# Patient Record
Sex: FEMALE | Race: WHITE | Employment: OTHER | URBAN - NONMETROPOLITAN AREA
[De-identification: names, ages, dates, MRNs, and addresses within clinical notes are randomized per-mention and may not be internally consistent; named-entity substitution may affect disease eponyms.]

---

## 2017-10-13 ENCOUNTER — HOSPITAL ENCOUNTER (OUTPATIENT)
Age: 62
Discharge: HOME | End: 2017-10-13
Payer: MEDICARE

## 2017-10-13 LAB
ALBUMIN SERPL-MCNC: 3.8 G/DL (ref 3.5–5.1)
ALP BLD-CCNC: 100 U/L (ref 38–126)
ALT SERPL-CCNC: 16 U/L (ref 11–66)
ANION GAP SERPL CALCULATED.3IONS-SCNC: 14 MEQ/L (ref 8–16)
ANISOCYTOSIS: ABNORMAL
AST SERPL-CCNC: 15 U/L (ref 5–40)
AVERAGE GLUCOSE: 117 MG/DL (ref 70–126)
BASOPHILS # BLD: 0.7 %
BASOPHILS ABSOLUTE: 0.1 THOU/MM3 (ref 0–0.1)
BILIRUB SERPL-MCNC: 0.4 MG/DL (ref 0.3–1.2)
BUN BLDV-MCNC: 14 MG/DL (ref 7–22)
CALCIUM SERPL-MCNC: 9.4 MG/DL (ref 8.5–10.5)
CHLORIDE BLD-SCNC: 97 MEQ/L (ref 98–111)
CHOLESTEROL, TOTAL: 179 MG/DL (ref 100–199)
CO2: 27 MEQ/L (ref 23–33)
CREAT SERPL-MCNC: 0.6 MG/DL (ref 0.4–1.2)
CREATININE, URINE: 76.2 MG/DL
CRENATED RBC'S: ABNORMAL
EOSINOPHIL # BLD: 0.8 %
EOSINOPHILS ABSOLUTE: 0.1 THOU/MM3 (ref 0–0.4)
GFR SERPL CREATININE-BSD FRML MDRD: > 90 ML/MIN/1.73M2
GLUCOSE BLD-MCNC: 138 MG/DL (ref 70–108)
HBA1C MFR BLD: 5.9 % (ref 4.4–6.4)
HCT VFR BLD CALC: 44.8 % (ref 37–47)
HDLC SERPL-MCNC: 69 MG/DL
HEMOGLOBIN: 14.5 GM/DL (ref 12–16)
HYPOCHROMIA: ABNORMAL
LDL CHOLESTEROL CALCULATED: 87 MG/DL
LYMPHOCYTES # BLD: 11.2 %
LYMPHOCYTES ABSOLUTE: 1.3 THOU/MM3 (ref 1–4.8)
MCH RBC QN AUTO: 25.2 PG (ref 27–31)
MCHC RBC AUTO-ENTMCNC: 32.5 GM/DL (ref 33–37)
MCV RBC AUTO: 77.6 FL (ref 81–99)
MICROALBUMIN UR-MCNC: < 1.2 MG/DL
MICROALBUMIN/CREAT UR-RTO: 16 MG/G (ref 0–30)
MICROCYTES: ABNORMAL
MONOCYTES # BLD: 6.1 %
MONOCYTES ABSOLUTE: 0.7 THOU/MM3 (ref 0.4–1.3)
NUCLEATED RED BLOOD CELLS: 0 /100 WBC
OVALOCYTES: ABNORMAL
PDW BLD-RTO: 15.5 % (ref 11.5–14.5)
PLATELET # BLD: 227 THOU/MM3 (ref 130–400)
PLATELET ESTIMATE: ADEQUATE
PMV BLD AUTO: 10.5 MCM (ref 7.4–10.4)
POTASSIUM SERPL-SCNC: 4.3 MEQ/L (ref 3.5–5.2)
RBC # BLD: 5.77 MILL/MM3 (ref 4.2–5.4)
RBC # BLD: NORMAL 10*6/UL
SCAN OF BLOOD SMEAR: NORMAL
SCHISTOCYTES: ABNORMAL
SEDIMENTATION RATE, ERYTHROCYTE: 26 MM/HR (ref 0–20)
SEG NEUTROPHILS: 81.2 %
SEGMENTED NEUTROPHILS ABSOLUTE COUNT: 9.7 THOU/MM3 (ref 1.8–7.7)
SODIUM BLD-SCNC: 138 MEQ/L (ref 135–145)
TOTAL PROTEIN: 7.2 G/DL (ref 6.1–8)
TRIGL SERPL-MCNC: 117 MG/DL (ref 0–199)
TSH SERPL DL<=0.05 MIU/L-ACNC: 0.69 UIU/ML (ref 0.4–4.2)
WBC # BLD: 12 THOU/MM3 (ref 4.8–10.8)

## 2017-10-13 PROCEDURE — 80061 LIPID PANEL: CPT

## 2017-10-13 PROCEDURE — 85651 RBC SED RATE NONAUTOMATED: CPT

## 2017-10-13 PROCEDURE — 82043 UR ALBUMIN QUANTITATIVE: CPT

## 2017-10-13 PROCEDURE — 36415 COLL VENOUS BLD VENIPUNCTURE: CPT

## 2017-10-13 PROCEDURE — 83036 HEMOGLOBIN GLYCOSYLATED A1C: CPT

## 2017-10-13 PROCEDURE — 80050 GENERAL HEALTH PANEL: CPT

## 2017-11-16 ENCOUNTER — HOSPITAL ENCOUNTER (OUTPATIENT)
Age: 62
Discharge: HOME | End: 2017-11-16
Payer: MEDICARE

## 2017-11-16 LAB
ANISOCYTOSIS: ABNORMAL
BASOPHILS # BLD: 0.5 %
BASOPHILS ABSOLUTE: 0 THOU/MM3 (ref 0–0.1)
EOSINOPHIL # BLD: 7.2 %
EOSINOPHILS ABSOLUTE: 0.7 THOU/MM3 (ref 0–0.4)
HCT VFR BLD CALC: 44.2 % (ref 37–47)
HEMOGLOBIN: 14.2 GM/DL (ref 12–16)
HYPOCHROMIA: ABNORMAL
IGE: 29 IU/ML
LYMPHOCYTES # BLD: 22.7 %
LYMPHOCYTES ABSOLUTE: 2.2 THOU/MM3 (ref 1–4.8)
MCH RBC QN AUTO: 24.7 PG (ref 27–31)
MCHC RBC AUTO-ENTMCNC: 32.1 GM/DL (ref 33–37)
MCV RBC AUTO: 77 FL (ref 81–99)
MICROCYTES: ABNORMAL
MONOCYTES # BLD: 6.5 %
MONOCYTES ABSOLUTE: 0.6 THOU/MM3 (ref 0.4–1.3)
NUCLEATED RED BLOOD CELLS: 0 /100 WBC
PDW BLD-RTO: 15.5 % (ref 11.5–14.5)
PLATELET # BLD: 243 THOU/MM3 (ref 130–400)
PMV BLD AUTO: 10.1 MCM (ref 7.4–10.4)
RBC # BLD: 5.74 MILL/MM3 (ref 4.2–5.4)
SEG NEUTROPHILS: 63.1 %
SEGMENTED NEUTROPHILS ABSOLUTE COUNT: 6.2 THOU/MM3 (ref 1.8–7.7)
WBC # BLD: 9.8 THOU/MM3 (ref 4.8–10.8)

## 2017-11-16 PROCEDURE — 82785 ASSAY OF IGE: CPT

## 2017-11-16 PROCEDURE — 85025 COMPLETE CBC W/AUTO DIFF WBC: CPT

## 2017-11-16 PROCEDURE — 36415 COLL VENOUS BLD VENIPUNCTURE: CPT

## 2018-01-08 ENCOUNTER — HOSPITAL ENCOUNTER (OUTPATIENT)
Age: 63
Discharge: HOME | End: 2018-01-08
Payer: MEDICARE

## 2018-01-08 LAB
ALT SERPL-CCNC: 25 U/L (ref 11–66)
ANISOCYTOSIS: ABNORMAL
BASOPHILS # BLD: 1.2 %
BASOPHILS ABSOLUTE: 0.1 THOU/MM3 (ref 0–0.1)
CREAT SERPL-MCNC: 0.7 MG/DL (ref 0.4–1.2)
EOSINOPHIL # BLD: 4.8 %
EOSINOPHILS ABSOLUTE: 0.5 THOU/MM3 (ref 0–0.4)
GFR SERPL CREATININE-BSD FRML MDRD: 85 ML/MIN/1.73M2
HCT VFR BLD CALC: 46 % (ref 37–47)
HEMOGLOBIN: 15.1 GM/DL (ref 12–16)
HYPOCHROMIA: ABNORMAL
LYMPHOCYTES # BLD: 26.5 %
LYMPHOCYTES ABSOLUTE: 2.8 THOU/MM3 (ref 1–4.8)
MCH RBC QN AUTO: 25.6 PG (ref 27–31)
MCHC RBC AUTO-ENTMCNC: 32.8 GM/DL (ref 33–37)
MCV RBC AUTO: 78.2 FL (ref 81–99)
MICROCYTES: ABNORMAL
MONOCYTES # BLD: 8.5 %
MONOCYTES ABSOLUTE: 0.9 THOU/MM3 (ref 0.4–1.3)
NUCLEATED RED BLOOD CELLS: 0 /100 WBC
PDW BLD-RTO: 15.8 % (ref 11.5–14.5)
PLATELET # BLD: 225 THOU/MM3 (ref 130–400)
PMV BLD AUTO: 9.6 MCM (ref 7.4–10.4)
RBC # BLD: 5.88 MILL/MM3 (ref 4.2–5.4)
SEDIMENTATION RATE, ERYTHROCYTE: 15 MM/HR (ref 0–20)
SEG NEUTROPHILS: 59 %
SEGMENTED NEUTROPHILS ABSOLUTE COUNT: 6.3 THOU/MM3 (ref 1.8–7.7)
WBC # BLD: 10.6 THOU/MM3 (ref 4.8–10.8)

## 2018-01-08 PROCEDURE — 36415 COLL VENOUS BLD VENIPUNCTURE: CPT

## 2018-01-08 PROCEDURE — 85025 COMPLETE CBC W/AUTO DIFF WBC: CPT

## 2018-01-08 PROCEDURE — 85651 RBC SED RATE NONAUTOMATED: CPT

## 2018-01-08 PROCEDURE — 82565 ASSAY OF CREATININE: CPT

## 2018-01-08 PROCEDURE — 84460 ALANINE AMINO (ALT) (SGPT): CPT

## 2018-04-20 ENCOUNTER — HOSPITAL ENCOUNTER (OUTPATIENT)
Age: 63
Discharge: HOME | End: 2018-04-20
Payer: MEDICARE

## 2018-04-20 LAB
ANION GAP SERPL CALCULATED.3IONS-SCNC: 12 MEQ/L (ref 8–16)
AVERAGE GLUCOSE: 126 MG/DL (ref 70–126)
BUN BLDV-MCNC: 12 MG/DL (ref 7–22)
CALCIUM SERPL-MCNC: 9.6 MG/DL (ref 8.5–10.5)
CHLORIDE BLD-SCNC: 103 MEQ/L (ref 98–111)
CO2: 28 MEQ/L (ref 23–33)
CREAT SERPL-MCNC: 0.6 MG/DL (ref 0.4–1.2)
GFR SERPL CREATININE-BSD FRML MDRD: > 90 ML/MIN/1.73M2
GLUCOSE BLD-MCNC: 136 MG/DL (ref 70–108)
HBA1C MFR BLD: 6.2 % (ref 4.4–6.4)
POTASSIUM SERPL-SCNC: 3.9 MEQ/L (ref 3.5–5.2)
SODIUM BLD-SCNC: 143 MEQ/L (ref 135–145)

## 2018-04-20 PROCEDURE — 83036 HEMOGLOBIN GLYCOSYLATED A1C: CPT

## 2018-04-20 PROCEDURE — 36415 COLL VENOUS BLD VENIPUNCTURE: CPT

## 2018-04-20 PROCEDURE — 80048 BASIC METABOLIC PNL TOTAL CA: CPT

## 2018-06-19 ENCOUNTER — HOSPITAL ENCOUNTER (OUTPATIENT)
Age: 63
Discharge: HOME | End: 2018-06-19
Payer: MEDICARE

## 2018-06-19 LAB
ALT SERPL-CCNC: 21 U/L (ref 11–66)
ANISOCYTOSIS: ABNORMAL
BASOPHILS # BLD: 0.8 %
BASOPHILS ABSOLUTE: 0.1 THOU/MM3 (ref 0–0.1)
CREAT SERPL-MCNC: 0.7 MG/DL (ref 0.4–1.2)
EOSINOPHIL # BLD: 6.9 %
EOSINOPHILS ABSOLUTE: 0.7 THOU/MM3 (ref 0–0.4)
GFR SERPL CREATININE-BSD FRML MDRD: 85 ML/MIN/1.73M2
HCT VFR BLD CALC: 42.3 % (ref 37–47)
HEMOGLOBIN: 13.9 GM/DL (ref 12–16)
HYPOCHROMIA: ABNORMAL
LYMPHOCYTES # BLD: 23.2 %
LYMPHOCYTES ABSOLUTE: 2.3 THOU/MM3 (ref 1–4.8)
MCH RBC QN AUTO: 25.8 PG (ref 27–31)
MCHC RBC AUTO-ENTMCNC: 32.8 GM/DL (ref 33–37)
MCV RBC AUTO: 78.5 FL (ref 81–99)
MICROCYTES: ABNORMAL
MONOCYTES # BLD: 9.1 %
MONOCYTES ABSOLUTE: 0.9 THOU/MM3 (ref 0.4–1.3)
NUCLEATED RED BLOOD CELLS: 0 /100 WBC
PDW BLD-RTO: 15.5 % (ref 11.5–14.5)
PLATELET # BLD: 233 THOU/MM3 (ref 130–400)
PMV BLD AUTO: 10.1 FL (ref 7.4–10.4)
RBC # BLD: 5.39 MILL/MM3 (ref 4.2–5.4)
SEDIMENTATION RATE, ERYTHROCYTE: 36 MM/HR (ref 0–20)
SEG NEUTROPHILS: 60 %
SEGMENTED NEUTROPHILS ABSOLUTE COUNT: 5.9 THOU/MM3 (ref 1.8–7.7)
WBC # BLD: 9.9 THOU/MM3 (ref 4.8–10.8)

## 2018-06-19 PROCEDURE — 85651 RBC SED RATE NONAUTOMATED: CPT

## 2018-06-19 PROCEDURE — 82565 ASSAY OF CREATININE: CPT

## 2018-06-19 PROCEDURE — 85025 COMPLETE CBC W/AUTO DIFF WBC: CPT

## 2018-06-19 PROCEDURE — 84460 ALANINE AMINO (ALT) (SGPT): CPT

## 2018-06-19 PROCEDURE — 36415 COLL VENOUS BLD VENIPUNCTURE: CPT

## 2019-01-10 PROBLEM — J45.50 SEVERE PERSISTENT ASTHMA: Status: ACTIVE | Noted: 2019-01-10

## 2019-01-15 ENCOUNTER — HOSPITAL ENCOUNTER (OUTPATIENT)
Dept: NURSING | Age: 64
Discharge: HOME | End: 2019-01-15
Payer: MEDICARE

## 2019-01-15 DIAGNOSIS — J45.50 SEVERE PERSISTENT ASTHMA, UNSPECIFIED WHETHER COMPLICATED: ICD-10-CM

## 2019-01-29 ENCOUNTER — HOSPITAL ENCOUNTER (OUTPATIENT)
Dept: NURSING | Age: 64
Discharge: HOME | End: 2019-01-29
Payer: MEDICARE

## 2019-01-29 VITALS
TEMPERATURE: 98 F | WEIGHT: 293 LBS | SYSTOLIC BLOOD PRESSURE: 156 MMHG | OXYGEN SATURATION: 98 % | DIASTOLIC BLOOD PRESSURE: 95 MMHG | RESPIRATION RATE: 18 BRPM | BODY MASS INDEX: 69.19 KG/M2 | HEART RATE: 71 BPM

## 2019-01-29 DIAGNOSIS — J45.50 SEVERE PERSISTENT ASTHMA, UNSPECIFIED WHETHER COMPLICATED: ICD-10-CM

## 2019-01-29 PROCEDURE — 96372 THER/PROPH/DIAG INJ SC/IM: CPT

## 2019-01-29 PROCEDURE — 6360000002 HC RX W HCPCS: Performed by: INTERNAL MEDICINE

## 2019-01-29 RX ADMIN — BENRALIZUMAB 30 MG: 30 INJECTION, SOLUTION SUBCUTANEOUS at 11:32

## 2019-01-29 ASSESSMENT — PAIN - FUNCTIONAL ASSESSMENT: PAIN_FUNCTIONAL_ASSESSMENT: 0-10

## 2019-01-29 NOTE — PROGRESS NOTES
1034 Patient arrived to \[facility]\"" ambulatory for injection  PT RIGHTS AND RESPONSIBILITIES OFFERED TO PT.  1102 Lungs clear on left side with inspiratory wheeze noted on right side  1132 Medication given to patient pulse ox and B/P cuff applied instructed patient on importance of calling out if any problems breathing or swelling verbalized understanding.   1145 Patient denies any complaints  1200 Patient denies any needs or complaints  1229 Discharge instructions reviewed with patient verbalized understanding  1235 Patient discharged to home in stable condition    _m___ Safety:       (Environmental)  Margy Honey to environment   Ensure ID band is correct and in place/ allergy band as needed   Assess for fall risk   Initiate fall precautions as applicable (fall band, side rails, etc.)   Call light within reach   Bed in low position/ wheels locked    m____ Pain:        Assess pain level and characteristics   Administer analgesics as ordered   Assess effectiveness of pain management and report to MD as needed    _m___ Knowledge Deficit:   Assess baseline knowledge   Provide teaching at level of understanding   Provide teaching via preferred learning method   Evaluate teaching effectiveness    _m___ Hemodynamic/Respiratory Status:       (Pre and Post Procedure Monitoring)   Assess/Monitor vital signs and LOC   Assess Baseline SpO2 prior to any sedation   Obtain weight/height   Assess vital signs/ LOC until patient meets discharge criteria   Monitor procedure site and notify MD of any issues

## 2019-02-26 ENCOUNTER — HOSPITAL ENCOUNTER (OUTPATIENT)
Dept: NURSING | Age: 64
Discharge: HOME | End: 2019-02-26
Payer: MEDICARE

## 2019-02-26 VITALS
DIASTOLIC BLOOD PRESSURE: 67 MMHG | SYSTOLIC BLOOD PRESSURE: 173 MMHG | HEART RATE: 92 BPM | RESPIRATION RATE: 20 BRPM | OXYGEN SATURATION: 95 % | TEMPERATURE: 96.9 F

## 2019-02-26 DIAGNOSIS — J45.50 SEVERE PERSISTENT ASTHMA, UNSPECIFIED WHETHER COMPLICATED: ICD-10-CM

## 2019-02-26 PROCEDURE — 6360000002 HC RX W HCPCS: Performed by: INTERNAL MEDICINE

## 2019-02-26 PROCEDURE — 96372 THER/PROPH/DIAG INJ SC/IM: CPT

## 2019-02-26 RX ADMIN — BENRALIZUMAB 30 MG: 30 INJECTION, SOLUTION SUBCUTANEOUS at 10:21

## 2019-02-26 NOTE — PROGRESS NOTES
8823 pt arrives ambulatory for fasenra injection. Injection explained and questions answered. PT RIGHTS AND RESPONSIBILITIES OFFERED TO PT. Pt educated that medication has to be sent up from pharmacy and that is the reason why I called her this AM to confirm she was coming to appointment and to order medication. Pt provided with OPN phone number and educated to call 2 hours prior to appointment. Pt verbalized understanding. 1021 injection given. Pt tolerated it well with no complaints. 1025 pt discharged ambulatory with instructions with no complaints.                      __m__ Safety:       (Environmental)   [place] to environment   Ensure ID band is correct and in place/ allergy band as needed   Assess for fall risk   Initiate fall precautions as applicable (fall band, side rails, etc.)   Call light within reach   Bed in low position/ wheels locked    __m__ Pain:        Assess pain level and characteristics   Administer analgesics as ordered   Assess effectiveness of pain management and report to MD as needed    _m___ Knowledge Deficit:   Assess baseline knowledge   Provide teaching at level of understanding   Provide teaching via preferred learning method   Evaluate teaching effectiveness    __m__ Hemodynamic/Respiratory Status:       (Pre and Post Procedure Monitoring)   Assess/Monitor vital signs and LOC   Assess Baseline SpO2 prior to any sedation   Obtain weight/height   Assess vital signs/ LOC until patient meets discharge criteria   Monitor procedure site and notify MD of any issues

## 2019-02-27 ENCOUNTER — HOSPITAL ENCOUNTER (OUTPATIENT)
Age: 64
Discharge: HOME | End: 2019-02-27
Payer: MEDICARE

## 2019-02-27 LAB
ALT SERPL-CCNC: 24 U/L (ref 11–66)
BASOPHILS # BLD: 0.5 %
BASOPHILS ABSOLUTE: 0 THOU/MM3 (ref 0–0.1)
CREAT SERPL-MCNC: 0.5 MG/DL (ref 0.4–1.2)
EOSINOPHIL # BLD: 0 %
EOSINOPHILS ABSOLUTE: 0 THOU/MM3 (ref 0–0.4)
ERYTHROCYTE [DISTWIDTH] IN BLOOD BY AUTOMATED COUNT: 15.2 % (ref 11.5–14.5)
ERYTHROCYTE [DISTWIDTH] IN BLOOD BY AUTOMATED COUNT: 43.5 FL (ref 35–45)
GFR SERPL CREATININE-BSD FRML MDRD: > 90 ML/MIN/1.73M2
HCT VFR BLD CALC: 46.2 % (ref 37–47)
HEMOGLOBIN: 14.2 GM/DL (ref 12–16)
IMMATURE GRANS (ABS): 0.02 THOU/MM3 (ref 0–0.07)
IMMATURE GRANULOCYTES: 0.3 %
LYMPHOCYTES # BLD: 29.3 %
LYMPHOCYTES ABSOLUTE: 1.9 THOU/MM3 (ref 1–4.8)
MCH RBC QN AUTO: 24.8 PG (ref 26–33)
MCHC RBC AUTO-ENTMCNC: 30.7 GM/DL (ref 32.2–35.5)
MCV RBC AUTO: 80.8 FL (ref 81–99)
MONOCYTES # BLD: 8.3 %
MONOCYTES ABSOLUTE: 0.5 THOU/MM3 (ref 0.4–1.3)
NUCLEATED RED BLOOD CELLS: 0 /100 WBC
PLATELET # BLD: 255 THOU/MM3 (ref 130–400)
PMV BLD AUTO: 12.3 FL (ref 9.4–12.4)
RBC # BLD: 5.72 MILL/MM3 (ref 4.2–5.4)
SEDIMENTATION RATE, ERYTHROCYTE: 28 MM/HR (ref 0–20)
SEG NEUTROPHILS: 61.6 %
SEGMENTED NEUTROPHILS ABSOLUTE COUNT: 3.9 THOU/MM3 (ref 1.8–7.7)
WBC # BLD: 6.4 THOU/MM3 (ref 4.8–10.8)

## 2019-02-27 PROCEDURE — 36415 COLL VENOUS BLD VENIPUNCTURE: CPT

## 2019-02-27 PROCEDURE — 84460 ALANINE AMINO (ALT) (SGPT): CPT

## 2019-02-27 PROCEDURE — 85651 RBC SED RATE NONAUTOMATED: CPT

## 2019-02-27 PROCEDURE — 82565 ASSAY OF CREATININE: CPT

## 2019-02-27 PROCEDURE — 85025 COMPLETE CBC W/AUTO DIFF WBC: CPT

## 2019-03-26 ENCOUNTER — HOSPITAL ENCOUNTER (OUTPATIENT)
Dept: NURSING | Age: 64
Discharge: HOME | End: 2019-03-26
Payer: MEDICARE

## 2019-03-26 VITALS
TEMPERATURE: 97.1 F | HEART RATE: 91 BPM | OXYGEN SATURATION: 94 % | RESPIRATION RATE: 22 BRPM | SYSTOLIC BLOOD PRESSURE: 156 MMHG | DIASTOLIC BLOOD PRESSURE: 71 MMHG

## 2019-03-26 DIAGNOSIS — J45.50 SEVERE PERSISTENT ASTHMA, UNSPECIFIED WHETHER COMPLICATED: Primary | ICD-10-CM

## 2019-03-26 PROCEDURE — 96372 THER/PROPH/DIAG INJ SC/IM: CPT

## 2019-03-26 PROCEDURE — 6360000002 HC RX W HCPCS: Performed by: INTERNAL MEDICINE

## 2019-03-26 RX ADMIN — BENRALIZUMAB 30 MG: 30 INJECTION, SOLUTION SUBCUTANEOUS at 09:04

## 2019-03-26 ASSESSMENT — PAIN - FUNCTIONAL ASSESSMENT: PAIN_FUNCTIONAL_ASSESSMENT: 0-10

## 2019-03-26 NOTE — PROGRESS NOTES
5329 Patient arrived to [facility] ambulatory for fasenra injection  PT RIGHTS AND RESPONSIBILITIES OFFERED TO PT.  0911 Discharge instructions given to patient verbalized understanding.  Patient discharged to home in stable condition      _m___ Safety:       (Environmental)  [place] to environment   Ensure ID band is correct and in place/ allergy band as needed   Assess for fall risk   Initiate fall precautions as applicable (fall band, side rails, etc.)   Call light within reach   Bed in low position/ wheels locked    m____ Pain:        Assess pain level and characteristics   Administer analgesics as ordered   Assess effectiveness of pain management and report to MD as needed    _m___ Knowledge Deficit:   Assess baseline knowledge   Provide teaching at level of understanding   Provide teaching via preferred learning method   Evaluate teaching effectiveness    _m___ Hemodynamic/Respiratory Status:       (Pre and Post Procedure Monitoring)   Assess/Monitor vital signs and LOC   Assess Baseline SpO2 prior to any sedation   Obtain weight/height   Assess vital signs/ LOC until patient meets discharge criteria   Monitor procedure site and notify MD of any issues

## 2019-05-20 ENCOUNTER — HOSPITAL ENCOUNTER (OUTPATIENT)
Dept: NURSING | Age: 64
Discharge: HOME | End: 2019-05-20
Payer: MEDICARE

## 2019-05-20 VITALS
RESPIRATION RATE: 20 BRPM | SYSTOLIC BLOOD PRESSURE: 160 MMHG | HEART RATE: 95 BPM | DIASTOLIC BLOOD PRESSURE: 97 MMHG | TEMPERATURE: 97.3 F | OXYGEN SATURATION: 96 %

## 2019-05-20 DIAGNOSIS — J45.50 SEVERE PERSISTENT ASTHMA, UNSPECIFIED WHETHER COMPLICATED: Primary | ICD-10-CM

## 2019-05-20 PROCEDURE — 96372 THER/PROPH/DIAG INJ SC/IM: CPT

## 2019-05-20 PROCEDURE — 6360000002 HC RX W HCPCS: Performed by: INTERNAL MEDICINE

## 2019-05-20 RX ADMIN — BENRALIZUMAB 30 MG: 30 INJECTION, SOLUTION SUBCUTANEOUS at 09:05

## 2019-05-20 NOTE — PROGRESS NOTES
0900 pt arrives ambulatory for fasenra injection. Injection explained and questions answered. PT RIGHTS AND RESPONSIBILITIES OFFERED TO PT.  0905 injection given. Pt tolerated it well with no complaints. 3886 pt discharged ambulatory with instructions with no complaints.                __m__ Safety:       (Environmental)   [place] to environment   Ensure ID band is correct and in place/ allergy band as needed   Assess for fall risk   Initiate fall precautions as applicable (fall band, side rails, etc.)   Call light within reach   Bed in low position/ wheels locked    __m__ Pain:        Assess pain level and characteristics   Administer analgesics as ordered   Assess effectiveness of pain management and report to MD as needed    __m__ Knowledge Deficit:   Assess baseline knowledge   Provide teaching at level of understanding   Provide teaching via preferred learning method   Evaluate teaching effectiveness    _m___ Hemodynamic/Respiratory Status:       (Pre and Post Procedure Monitoring)   Assess/Monitor vital signs and LOC   Assess Baseline SpO2 prior to any sedation   Obtain weight/height   Assess vital signs/ LOC until patient meets discharge criteria   Monitor procedure site and notify MD of any issues

## 2019-07-16 ENCOUNTER — HOSPITAL ENCOUNTER (OUTPATIENT)
Dept: NURSING | Age: 64
End: 2019-07-16
Payer: MEDICARE

## 2019-09-16 ENCOUNTER — TELEPHONE (OUTPATIENT)
Dept: ADMINISTRATIVE | Age: 64
End: 2019-09-16

## 2020-02-04 ENCOUNTER — TELEPHONE (OUTPATIENT)
Dept: SPIRITUAL SERVICES | Facility: CLINIC | Age: 65
End: 2020-02-04

## 2020-02-04 NOTE — TELEPHONE ENCOUNTER
I responded to a voicemail message received from Hayder requesting support for the completion of Advance Directives documents. I contacted Hayder via telephone call to offer support for the completion of documents. An appointment is scheduled for 2/28 at [facility].

## 2020-02-28 ENCOUNTER — TELEPHONE (OUTPATIENT)
Dept: SPIRITUAL SERVICES | Facility: CLINIC | Age: 65
End: 2020-02-28
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
## 2020-07-21 ENCOUNTER — NURSE ONLY (OUTPATIENT)
Dept: LAB | Age: 65
End: 2020-07-21

## 2020-07-21 LAB
ALT SERPL-CCNC: 14 U/L (ref 11–66)
BASOPHILS # BLD: 0.4 %
BASOPHILS ABSOLUTE: 0 THOU/MM3 (ref 0–0.1)
CREAT SERPL-MCNC: 0.5 MG/DL (ref 0.4–1.2)
EOSINOPHIL # BLD: 0 %
EOSINOPHILS ABSOLUTE: 0 THOU/MM3 (ref 0–0.4)
ERYTHROCYTE [DISTWIDTH] IN BLOOD BY AUTOMATED COUNT: 14.5 % (ref 11.5–14.5)
ERYTHROCYTE [DISTWIDTH] IN BLOOD BY AUTOMATED COUNT: 42 FL (ref 35–45)
GFR SERPL CREATININE-BSD FRML MDRD: > 90 ML/MIN/1.73M2
HCT VFR BLD CALC: 46 % (ref 37–47)
HEMOGLOBIN: 14.4 GM/DL (ref 12–16)
IMMATURE GRANS (ABS): 0.03 THOU/MM3 (ref 0–0.07)
IMMATURE GRANULOCYTES: 0.4 %
LYMPHOCYTES # BLD: 28.3 %
LYMPHOCYTES ABSOLUTE: 2 THOU/MM3 (ref 1–4.8)
MCH RBC QN AUTO: 25.5 PG (ref 26–33)
MCHC RBC AUTO-ENTMCNC: 31.3 GM/DL (ref 32.2–35.5)
MCV RBC AUTO: 81.4 FL (ref 81–99)
MONOCYTES # BLD: 8 %
MONOCYTES ABSOLUTE: 0.6 THOU/MM3 (ref 0.4–1.3)
NUCLEATED RED BLOOD CELLS: 0 /100 WBC
PLATELET # BLD: 284 THOU/MM3 (ref 130–400)
PMV BLD AUTO: 12.2 FL (ref 9.4–12.4)
RBC # BLD: 5.65 MILL/MM3 (ref 4.2–5.4)
SEDIMENTATION RATE, ERYTHROCYTE: 18 MM/HR (ref 0–20)
SEG NEUTROPHILS: 62.9 %
SEGMENTED NEUTROPHILS ABSOLUTE COUNT: 4.4 THOU/MM3 (ref 1.8–7.7)
WBC # BLD: 7 THOU/MM3 (ref 4.8–10.8)

## 2022-08-17 ENCOUNTER — TELEPHONE (OUTPATIENT)
Dept: PHARMACY | Facility: CLINIC | Age: 67
End: 2022-08-17

## 2022-08-18 NOTE — TELEPHONE ENCOUNTER
[facility] CLINICAL PHARMACY: STATIN THERAPY REVIEW  Identified statin use in persons with diabetes care gap per [place]. Records dated: 8/8/22. Last Office Visit: attributed provider Naga Bolanos. Listed PCP KALIN Aguilera CNP and last visit 4/27/22. ASSESSMENT  ACE/ARB ADHERENCE    Insurance Records claims through 8/8/22 (YTD Travis Garzon =  100%; Potential Fail Date: 10/25/22 ):   Losartan 100 mg tab Next refill due: 8/24/22    Per Reconciled Dispense Report:  LOSARTAN 100MG TABLETS 05/26/2022 90 90 each [address] #. .. LOSARTAN 100MG TABLETS 02/25/2022 90 90 each [address] #. .. LOSARTAN 100MG TABLETS 11/28/2021 90 90 each [address] #. .. LOSARTAN 100MG TABLETS 08/24/2021 90 90 each [address] #. .. LOSARTAN 100MG TABLETS 05/20/2021 90 90 each [address] #. .. 2 refills per hyperlink    BP Readings from Last 3 Encounters:   05/20/19 (!) 160/97   03/26/19 (!) 156/71   02/26/19 (!) 173/67     CrCl cannot be calculated (Patient's most recent lab result is older than the maximum 180 days allowed. ). DIABETES ADHERENCE - PDC n/a since is on insulin    Lab Results   Component Value Date    LABA1C 6.2 04/20/2018    LABA1C 5.9 10/13/2017    LABA1C 6.2 05/01/2017     STATIN GAP IDENTIFIED    Per chart review, patient does not appear to be taking statin medication. No statin fills in reconcile dispense.  Little information in this EHR; appears historically has LDL under 100 mg/dL    Component      Latest Ref Rng & Units 10/13/2017 10/6/2016 4/29/2015           1:35 PM  8:46 AM 10:39 AM   CHOLESTEROL, TOTAL, 061415      100 - 199 mg/dL 179 160 140   Triglycerides      0 - 199 mg/dL 117 133 60   HDL Cholesterol      mg/dL 69 59 67   LDL Calculated      mg/dL 87 74 61     ALT   Date Value Ref Range Status   07/21/2020 14 11 - 66 U/L Final Comment:     Performed at [facility], [address]     AST   Date Value Ref Range Status   10/13/2017 15 5 - 40 U/L Final       The ASCVD Risk score (Olivia Sage, et al., 2013) failed to calculate for the following reasons: The systolic blood pressure is missing    Cannot find a previous HDL lab    Cannot find a previous total cholesterol lab     Hyperlipidemia Goal: SUPD care gap per insurance (filling insulin). Little information in chart. Listed PCP KALIN Sy CNP coding for type 2 DM. Possibly not on statin due to lower LDL, but no recent in EHR to assess. PLAN  - consider statin therapy for primary prevention - fax sent to KALIN Sy CNP   - unclear attribution (Dr. Thomas Huffman listed as attributed provider; apperas to be seeing KALIN Sy CNP)    No future appointments.     Ramila Gutierrez, PharmD, [facility]  Department, toll free: [phone] option 1    For Pharmacy [facility] in place:  No  Recommendation Provided To: Provider: 1 via Fax sent to office  Intervention Detail: New Rx: 1, reason: Needs Additional Therapy  Gap Closed?: No   Intervention Accepted By: Provider: 0  Time Spent (min): 20

## 2024-09-26 ENCOUNTER — HOSPITAL ENCOUNTER (INPATIENT)
Age: 69
LOS: 12 days | Discharge: HOME HEALTH | DRG: 357 | End: 2024-10-08
Attending: STUDENT IN AN ORGANIZED HEALTH CARE EDUCATION/TRAINING PROGRAM | Admitting: NURSE PRACTITIONER
Payer: MEDICARE

## 2024-09-26 ENCOUNTER — APPOINTMENT (OUTPATIENT)
Dept: CT IMAGING | Age: 69
DRG: 357 | End: 2024-09-26
Payer: MEDICARE

## 2024-09-26 ENCOUNTER — APPOINTMENT (OUTPATIENT)
Dept: GENERAL RADIOLOGY | Age: 69
DRG: 357 | End: 2024-09-26
Payer: MEDICARE

## 2024-09-26 DIAGNOSIS — K57.20 DIVERTICULITIS OF LARGE INTESTINE WITH ABSCESS, UNSPECIFIED BLEEDING STATUS: Primary | ICD-10-CM

## 2024-09-26 PROBLEM — K57.92 DIVERTICULITIS: Status: ACTIVE | Noted: 2024-09-26

## 2024-09-26 PROBLEM — E66.01 MORBID OBESITY WITH BMI OF 50.0-59.9, ADULT: Status: ACTIVE | Noted: 2024-09-26

## 2024-09-26 PROBLEM — E05.90 HYPERTHYROIDISM: Status: ACTIVE | Noted: 2024-09-26

## 2024-09-26 LAB
ALBUMIN SERPL BCG-MCNC: 2.7 G/DL (ref 3.5–5.1)
ALP SERPL-CCNC: 132 U/L (ref 38–126)
ALT SERPL W/O P-5'-P-CCNC: 33 U/L (ref 11–66)
ANION GAP SERPL CALC-SCNC: 10 MEQ/L (ref 8–16)
AST SERPL-CCNC: 41 U/L (ref 5–40)
BACTERIA URNS QL MICRO: ABNORMAL /HPF
BASOPHILS ABSOLUTE: 0 THOU/MM3 (ref 0–0.1)
BASOPHILS NFR BLD AUTO: 0.2 %
BILIRUB SERPL-MCNC: 1.3 MG/DL (ref 0.3–1.2)
BILIRUB UR QL STRIP.AUTO: ABNORMAL
BUN SERPL-MCNC: 14 MG/DL (ref 7–22)
CALCIUM SERPL-MCNC: 9 MG/DL (ref 8.5–10.5)
CASTS #/AREA URNS LPF: ABNORMAL /LPF
CASTS 2: ABNORMAL /LPF
CHARACTER UR: ABNORMAL
CHLORIDE SERPL-SCNC: 100 MEQ/L (ref 98–111)
CO2 SERPL-SCNC: 29 MEQ/L (ref 23–33)
COLOR, UA: ABNORMAL
CREAT SERPL-MCNC: 0.4 MG/DL (ref 0.4–1.2)
CRYSTALS URNS MICRO: ABNORMAL
DEPRECATED RDW RBC AUTO: 37.2 FL (ref 35–45)
EKG ATRIAL RATE: 84 BPM
EKG ATRIAL RATE: 92 BPM
EKG P AXIS: 32 DEGREES
EKG P AXIS: 59 DEGREES
EKG P-R INTERVAL: 118 MS
EKG P-R INTERVAL: 126 MS
EKG Q-T INTERVAL: 364 MS
EKG Q-T INTERVAL: 382 MS
EKG QRS DURATION: 86 MS
EKG QRS DURATION: 92 MS
EKG QTC CALCULATION (BAZETT): 450 MS
EKG QTC CALCULATION (BAZETT): 451 MS
EKG R AXIS: 14 DEGREES
EKG R AXIS: 20 DEGREES
EKG T AXIS: -161 DEGREES
EKG T AXIS: 127 DEGREES
EKG VENTRICULAR RATE: 84 BPM
EKG VENTRICULAR RATE: 92 BPM
EOSINOPHIL NFR BLD AUTO: 0 %
EOSINOPHILS ABSOLUTE: 0 THOU/MM3 (ref 0–0.4)
EPITHELIAL CELLS, UA: ABNORMAL /HPF
ERYTHROCYTE [DISTWIDTH] IN BLOOD BY AUTOMATED COUNT: 13.7 % (ref 11.5–14.5)
GFR SERPL CREATININE-BSD FRML MDRD: > 90 ML/MIN/1.73M2
GLUCOSE BLD STRIP.AUTO-MCNC: 119 MG/DL (ref 70–108)
GLUCOSE BLD STRIP.AUTO-MCNC: 141 MG/DL (ref 70–108)
GLUCOSE SERPL-MCNC: 139 MG/DL (ref 70–108)
GLUCOSE UR QL STRIP.AUTO: ABNORMAL MG/DL
HCT VFR BLD AUTO: 37.2 % (ref 37–47)
HGB BLD-MCNC: 11.6 GM/DL (ref 12–16)
HGB UR QL STRIP.AUTO: ABNORMAL
IMM GRANULOCYTES # BLD AUTO: 0.06 THOU/MM3 (ref 0–0.07)
IMM GRANULOCYTES NFR BLD AUTO: 0.5 %
KETONES UR QL STRIP.AUTO: ABNORMAL
LIPASE SERPL-CCNC: 6.5 U/L (ref 5.6–51.3)
LYMPHOCYTES ABSOLUTE: 1.3 THOU/MM3 (ref 1–4.8)
LYMPHOCYTES NFR BLD AUTO: 10.6 %
MCH RBC QN AUTO: 23.6 PG (ref 26–33)
MCHC RBC AUTO-ENTMCNC: 31.2 GM/DL (ref 32.2–35.5)
MCV RBC AUTO: 75.8 FL (ref 81–99)
MISCELLANEOUS 2: ABNORMAL
MONOCYTES ABSOLUTE: 1.3 THOU/MM3 (ref 0.4–1.3)
MONOCYTES NFR BLD AUTO: 10.7 %
NEUTROPHILS ABSOLUTE: 9.5 THOU/MM3 (ref 1.8–7.7)
NEUTROPHILS NFR BLD AUTO: 78 %
NITRITE UR QL STRIP: ABNORMAL
NRBC BLD AUTO-RTO: 0 /100 WBC
OSMOLALITY SERPL CALC.SUM OF ELEC: 280.3 MOSMOL/KG (ref 275–300)
PH UR STRIP.AUTO: ABNORMAL [PH] (ref 5–9)
PLATELET # BLD AUTO: 245 THOU/MM3 (ref 130–400)
PMV BLD AUTO: 13 FL (ref 9.4–12.4)
POTASSIUM SERPL-SCNC: 4.2 MEQ/L (ref 3.5–5.2)
PROT SERPL-MCNC: 6.4 G/DL (ref 6.1–8)
PROT UR STRIP.AUTO-MCNC: ABNORMAL MG/DL
RBC # BLD AUTO: 4.91 MILL/MM3 (ref 4.2–5.4)
RBC URINE: ABNORMAL /HPF
REASON FOR REJECTION: NORMAL
REJECTED TEST: NORMAL
RENAL EPI CELLS #/AREA URNS HPF: ABNORMAL /[HPF]
SODIUM SERPL-SCNC: 139 MEQ/L (ref 135–145)
SP GR UR REFRACT.AUTO: ABNORMAL (ref 1–1.03)
T4 FREE SERPL-MCNC: 3.42 NG/DL (ref 0.93–1.68)
TSH SERPL DL<=0.005 MIU/L-ACNC: 0.01 UIU/ML (ref 0.4–4.2)
UROBILINOGEN, URINE: ABNORMAL EU/DL (ref 0–1)
WBC # BLD AUTO: 12.2 THOU/MM3 (ref 4.8–10.8)
WBC #/AREA URNS HPF: ABNORMAL /HPF
WBC #/AREA URNS HPF: ABNORMAL /[HPF]
YEAST LIKE FUNGI URNS QL MICRO: ABNORMAL

## 2024-09-26 PROCEDURE — 93005 ELECTROCARDIOGRAM TRACING: CPT | Performed by: STUDENT IN AN ORGANIZED HEALTH CARE EDUCATION/TRAINING PROGRAM

## 2024-09-26 PROCEDURE — 71046 X-RAY EXAM CHEST 2 VIEWS: CPT

## 2024-09-26 PROCEDURE — 83690 ASSAY OF LIPASE: CPT

## 2024-09-26 PROCEDURE — 99223 1ST HOSP IP/OBS HIGH 75: CPT | Performed by: SURGERY

## 2024-09-26 PROCEDURE — 96365 THER/PROPH/DIAG IV INF INIT: CPT

## 2024-09-26 PROCEDURE — 2580000003 HC RX 258: Performed by: PHARMACIST

## 2024-09-26 PROCEDURE — 1200000003 HC TELEMETRY R&B

## 2024-09-26 PROCEDURE — 2580000003 HC RX 258: Performed by: STUDENT IN AN ORGANIZED HEALTH CARE EDUCATION/TRAINING PROGRAM

## 2024-09-26 PROCEDURE — 6360000002 HC RX W HCPCS: Performed by: STUDENT IN AN ORGANIZED HEALTH CARE EDUCATION/TRAINING PROGRAM

## 2024-09-26 PROCEDURE — 80053 COMPREHEN METABOLIC PANEL: CPT

## 2024-09-26 PROCEDURE — 96375 TX/PRO/DX INJ NEW DRUG ADDON: CPT

## 2024-09-26 PROCEDURE — 84480 ASSAY TRIIODOTHYRONINE (T3): CPT

## 2024-09-26 PROCEDURE — 84481 FREE ASSAY (FT-3): CPT

## 2024-09-26 PROCEDURE — 2580000003 HC RX 258: Performed by: PHYSICIAN ASSISTANT

## 2024-09-26 PROCEDURE — 87086 URINE CULTURE/COLONY COUNT: CPT

## 2024-09-26 PROCEDURE — 74177 CT ABD & PELVIS W/CONTRAST: CPT

## 2024-09-26 PROCEDURE — 99223 1ST HOSP IP/OBS HIGH 75: CPT | Performed by: STUDENT IN AN ORGANIZED HEALTH CARE EDUCATION/TRAINING PROGRAM

## 2024-09-26 PROCEDURE — 85025 COMPLETE CBC W/AUTO DIFF WBC: CPT

## 2024-09-26 PROCEDURE — 99285 EMERGENCY DEPT VISIT HI MDM: CPT

## 2024-09-26 PROCEDURE — 36415 COLL VENOUS BLD VENIPUNCTURE: CPT

## 2024-09-26 PROCEDURE — 6360000002 HC RX W HCPCS: Performed by: PHARMACIST

## 2024-09-26 PROCEDURE — 6370000000 HC RX 637 (ALT 250 FOR IP): Performed by: STUDENT IN AN ORGANIZED HEALTH CARE EDUCATION/TRAINING PROGRAM

## 2024-09-26 PROCEDURE — 84439 ASSAY OF FREE THYROXINE: CPT

## 2024-09-26 PROCEDURE — 81001 URINALYSIS AUTO W/SCOPE: CPT

## 2024-09-26 PROCEDURE — 6360000002 HC RX W HCPCS: Performed by: PHYSICIAN ASSISTANT

## 2024-09-26 PROCEDURE — 84443 ASSAY THYROID STIM HORMONE: CPT

## 2024-09-26 PROCEDURE — 6360000004 HC RX CONTRAST MEDICATION: Performed by: PHYSICIAN ASSISTANT

## 2024-09-26 PROCEDURE — 93010 ELECTROCARDIOGRAM REPORT: CPT | Performed by: NUCLEAR MEDICINE

## 2024-09-26 PROCEDURE — 87040 BLOOD CULTURE FOR BACTERIA: CPT

## 2024-09-26 PROCEDURE — 87077 CULTURE AEROBIC IDENTIFY: CPT

## 2024-09-26 PROCEDURE — 82948 REAGENT STRIP/BLOOD GLUCOSE: CPT

## 2024-09-26 PROCEDURE — 87186 SC STD MICRODIL/AGAR DIL: CPT

## 2024-09-26 RX ORDER — IOPAMIDOL 755 MG/ML
80 INJECTION, SOLUTION INTRAVASCULAR
Status: COMPLETED | OUTPATIENT
Start: 2024-09-26 | End: 2024-09-26

## 2024-09-26 RX ORDER — INSULIN LISPRO 100 [IU]/ML
0-4 INJECTION, SOLUTION INTRAVENOUS; SUBCUTANEOUS EVERY 6 HOURS
Status: DISCONTINUED | OUTPATIENT
Start: 2024-09-27 | End: 2024-10-08 | Stop reason: HOSPADM

## 2024-09-26 RX ORDER — LISINOPRIL 40 MG/1
40 TABLET ORAL DAILY
Status: DISCONTINUED | OUTPATIENT
Start: 2024-09-26 | End: 2024-10-08 | Stop reason: HOSPADM

## 2024-09-26 RX ORDER — HYDRALAZINE HYDROCHLORIDE 20 MG/ML
5 INJECTION INTRAMUSCULAR; INTRAVENOUS ONCE
Status: DISCONTINUED | OUTPATIENT
Start: 2024-09-26 | End: 2024-09-26

## 2024-09-26 RX ORDER — ONDANSETRON 2 MG/ML
4 INJECTION INTRAMUSCULAR; INTRAVENOUS EVERY 6 HOURS PRN
Status: DISCONTINUED | OUTPATIENT
Start: 2024-09-26 | End: 2024-10-08 | Stop reason: HOSPADM

## 2024-09-26 RX ORDER — ACETAMINOPHEN 325 MG/1
650 TABLET ORAL EVERY 6 HOURS PRN
Status: DISCONTINUED | OUTPATIENT
Start: 2024-09-26 | End: 2024-10-08 | Stop reason: HOSPADM

## 2024-09-26 RX ORDER — GLUCAGON 1 MG/ML
1 KIT INJECTION PRN
Status: DISCONTINUED | OUTPATIENT
Start: 2024-09-26 | End: 2024-10-08 | Stop reason: HOSPADM

## 2024-09-26 RX ORDER — MORPHINE SULFATE 4 MG/ML
4 INJECTION, SOLUTION INTRAMUSCULAR; INTRAVENOUS
Status: DISCONTINUED | OUTPATIENT
Start: 2024-09-26 | End: 2024-09-26

## 2024-09-26 RX ORDER — ACETAMINOPHEN 650 MG/1
650 SUPPOSITORY RECTAL EVERY 6 HOURS PRN
Status: DISCONTINUED | OUTPATIENT
Start: 2024-09-26 | End: 2024-10-08 | Stop reason: HOSPADM

## 2024-09-26 RX ORDER — DEXTROSE MONOHYDRATE 100 MG/ML
INJECTION, SOLUTION INTRAVENOUS CONTINUOUS PRN
Status: DISCONTINUED | OUTPATIENT
Start: 2024-09-26 | End: 2024-10-08 | Stop reason: HOSPADM

## 2024-09-26 RX ORDER — ONDANSETRON 2 MG/ML
4 INJECTION INTRAMUSCULAR; INTRAVENOUS ONCE
Status: DISCONTINUED | OUTPATIENT
Start: 2024-09-26 | End: 2024-09-26

## 2024-09-26 RX ORDER — INSULIN LISPRO 100 [IU]/ML
0-4 INJECTION, SOLUTION INTRAVENOUS; SUBCUTANEOUS
Status: DISCONTINUED | OUTPATIENT
Start: 2024-09-26 | End: 2024-09-26

## 2024-09-26 RX ORDER — 0.9 % SODIUM CHLORIDE 0.9 %
1000 INTRAVENOUS SOLUTION INTRAVENOUS ONCE
Status: COMPLETED | OUTPATIENT
Start: 2024-09-26 | End: 2024-09-26

## 2024-09-26 RX ORDER — MONTELUKAST SODIUM 10 MG/1
10 TABLET ORAL NIGHTLY
Status: DISCONTINUED | OUTPATIENT
Start: 2024-09-26 | End: 2024-10-08 | Stop reason: HOSPADM

## 2024-09-26 RX ORDER — HYDRALAZINE HYDROCHLORIDE 20 MG/ML
5 INJECTION INTRAMUSCULAR; INTRAVENOUS ONCE
Status: COMPLETED | OUTPATIENT
Start: 2024-09-26 | End: 2024-09-26

## 2024-09-26 RX ORDER — POTASSIUM CHLORIDE 7.45 MG/ML
10 INJECTION INTRAVENOUS PRN
Status: DISCONTINUED | OUTPATIENT
Start: 2024-09-26 | End: 2024-10-08 | Stop reason: HOSPADM

## 2024-09-26 RX ORDER — POLYETHYLENE GLYCOL 3350 17 G/17G
17 POWDER, FOR SOLUTION ORAL DAILY PRN
Status: DISCONTINUED | OUTPATIENT
Start: 2024-09-26 | End: 2024-10-08 | Stop reason: HOSPADM

## 2024-09-26 RX ORDER — MAGNESIUM SULFATE IN WATER 40 MG/ML
2000 INJECTION, SOLUTION INTRAVENOUS PRN
Status: DISCONTINUED | OUTPATIENT
Start: 2024-09-26 | End: 2024-10-08 | Stop reason: HOSPADM

## 2024-09-26 RX ORDER — GLUCAGON 1 MG/ML
1 KIT INJECTION PRN
Status: DISCONTINUED | OUTPATIENT
Start: 2024-09-26 | End: 2024-09-26 | Stop reason: SDUPTHER

## 2024-09-26 RX ORDER — ONDANSETRON 4 MG/1
4 TABLET, ORALLY DISINTEGRATING ORAL EVERY 8 HOURS PRN
Status: DISCONTINUED | OUTPATIENT
Start: 2024-09-26 | End: 2024-10-08 | Stop reason: HOSPADM

## 2024-09-26 RX ORDER — METHIMAZOLE 5 MG/1
5 TABLET ORAL 3 TIMES DAILY
Status: DISCONTINUED | OUTPATIENT
Start: 2024-09-27 | End: 2024-09-26

## 2024-09-26 RX ORDER — DEXTROSE MONOHYDRATE 100 MG/ML
INJECTION, SOLUTION INTRAVENOUS CONTINUOUS PRN
Status: DISCONTINUED | OUTPATIENT
Start: 2024-09-26 | End: 2024-09-26 | Stop reason: SDUPTHER

## 2024-09-26 RX ORDER — ALBUTEROL SULFATE 0.83 MG/ML
2.5 SOLUTION RESPIRATORY (INHALATION) EVERY 6 HOURS PRN
Status: DISCONTINUED | OUTPATIENT
Start: 2024-09-26 | End: 2024-10-08 | Stop reason: HOSPADM

## 2024-09-26 RX ORDER — POTASSIUM CHLORIDE 1500 MG/1
40 TABLET, EXTENDED RELEASE ORAL PRN
Status: DISCONTINUED | OUTPATIENT
Start: 2024-09-26 | End: 2024-10-08 | Stop reason: HOSPADM

## 2024-09-26 RX ORDER — METHIMAZOLE 10 MG/1
10 TABLET ORAL 3 TIMES DAILY
Status: DISCONTINUED | OUTPATIENT
Start: 2024-09-27 | End: 2024-10-07

## 2024-09-26 RX ORDER — ENOXAPARIN SODIUM 100 MG/ML
30 INJECTION SUBCUTANEOUS 2 TIMES DAILY
Status: DISCONTINUED | OUTPATIENT
Start: 2024-09-26 | End: 2024-10-08 | Stop reason: HOSPADM

## 2024-09-26 RX ORDER — PROCHLORPERAZINE EDISYLATE 5 MG/ML
10 INJECTION INTRAMUSCULAR; INTRAVENOUS ONCE
Status: COMPLETED | OUTPATIENT
Start: 2024-09-26 | End: 2024-09-26

## 2024-09-26 RX ORDER — SODIUM CHLORIDE 0.9 % (FLUSH) 0.9 %
5-40 SYRINGE (ML) INJECTION EVERY 12 HOURS SCHEDULED
Status: DISCONTINUED | OUTPATIENT
Start: 2024-09-26 | End: 2024-10-08 | Stop reason: HOSPADM

## 2024-09-26 RX ORDER — DIPHENHYDRAMINE HYDROCHLORIDE 50 MG/ML
25 INJECTION INTRAMUSCULAR; INTRAVENOUS ONCE
Status: COMPLETED | OUTPATIENT
Start: 2024-09-26 | End: 2024-09-26

## 2024-09-26 RX ORDER — HYDROXYCHLOROQUINE SULFATE 200 MG/1
200 TABLET, FILM COATED ORAL DAILY
Status: DISCONTINUED | OUTPATIENT
Start: 2024-09-27 | End: 2024-10-08 | Stop reason: HOSPADM

## 2024-09-26 RX ORDER — SODIUM CHLORIDE 0.9 % (FLUSH) 0.9 %
5-40 SYRINGE (ML) INJECTION PRN
Status: DISCONTINUED | OUTPATIENT
Start: 2024-09-26 | End: 2024-10-08 | Stop reason: HOSPADM

## 2024-09-26 RX ORDER — PROCHLORPERAZINE EDISYLATE 5 MG/ML
10 INJECTION INTRAMUSCULAR; INTRAVENOUS EVERY 6 HOURS PRN
Status: DISCONTINUED | OUTPATIENT
Start: 2024-09-26 | End: 2024-10-08 | Stop reason: HOSPADM

## 2024-09-26 RX ORDER — SODIUM CHLORIDE 9 MG/ML
INJECTION, SOLUTION INTRAVENOUS PRN
Status: DISCONTINUED | OUTPATIENT
Start: 2024-09-26 | End: 2024-10-08 | Stop reason: HOSPADM

## 2024-09-26 RX ORDER — INSULIN LISPRO 100 [IU]/ML
0-4 INJECTION, SOLUTION INTRAVENOUS; SUBCUTANEOUS NIGHTLY
Status: DISCONTINUED | OUTPATIENT
Start: 2024-09-26 | End: 2024-09-26

## 2024-09-26 RX ADMIN — DICLOFENAC SODIUM 2 G: 10 GEL TOPICAL at 20:16

## 2024-09-26 RX ADMIN — ENOXAPARIN SODIUM 30 MG: 100 INJECTION SUBCUTANEOUS at 20:16

## 2024-09-26 RX ADMIN — SODIUM CHLORIDE 1000 ML: 9 INJECTION, SOLUTION INTRAVENOUS at 11:04

## 2024-09-26 RX ADMIN — MONTELUKAST 10 MG: 10 TABLET, FILM COATED ORAL at 20:16

## 2024-09-26 RX ADMIN — HYDRALAZINE HYDROCHLORIDE 5 MG: 20 INJECTION INTRAMUSCULAR; INTRAVENOUS at 16:45

## 2024-09-26 RX ADMIN — IOPAMIDOL 80 ML: 755 INJECTION, SOLUTION INTRAVENOUS at 11:32

## 2024-09-26 RX ADMIN — PIPERACILLIN AND TAZOBACTAM 4500 MG: 4; .5 INJECTION, POWDER, LYOPHILIZED, FOR SOLUTION INTRAVENOUS at 12:34

## 2024-09-26 RX ADMIN — LISINOPRIL 40 MG: 40 TABLET ORAL at 20:16

## 2024-09-26 RX ADMIN — PROCHLORPERAZINE EDISYLATE 10 MG: 5 INJECTION INTRAMUSCULAR; INTRAVENOUS at 11:06

## 2024-09-26 RX ADMIN — PIPERACILLIN AND TAZOBACTAM 3375 MG: 3; .375 INJECTION, POWDER, FOR SOLUTION INTRAVENOUS at 20:28

## 2024-09-26 RX ADMIN — DIPHENHYDRAMINE HYDROCHLORIDE 25 MG: 50 INJECTION INTRAMUSCULAR; INTRAVENOUS at 11:05

## 2024-09-26 ASSESSMENT — LIFESTYLE VARIABLES
HOW OFTEN DO YOU HAVE A DRINK CONTAINING ALCOHOL: NEVER
HOW MANY STANDARD DRINKS CONTAINING ALCOHOL DO YOU HAVE ON A TYPICAL DAY: PATIENT DOES NOT DRINK

## 2024-09-26 ASSESSMENT — PAIN - FUNCTIONAL ASSESSMENT: PAIN_FUNCTIONAL_ASSESSMENT: 0-10

## 2024-09-26 ASSESSMENT — PAIN DESCRIPTION - LOCATION: LOCATION: ABDOMEN

## 2024-09-26 ASSESSMENT — PAIN DESCRIPTION - DESCRIPTORS: DESCRIPTORS: SORE

## 2024-09-26 ASSESSMENT — PAIN SCALES - GENERAL: PAINLEVEL_OUTOF10: 7

## 2024-09-26 NOTE — PLAN OF CARE
Problem: Chronic Conditions and Co-morbidities  Goal: Patient's chronic conditions and co-morbidity symptoms are monitored and maintained or improved  Outcome: Progressing  Flowsheets (Taken 9/26/2024 1727)  Care Plan - Patient's Chronic Conditions and Co-Morbidity Symptoms are Monitored and Maintained or Improved:   Monitor and assess patient's chronic conditions and comorbid symptoms for stability, deterioration, or improvement   Collaborate with multidisciplinary team to address chronic and comorbid conditions and prevent exacerbation or deterioration   Update acute care plan with appropriate goals if chronic or comorbid symptoms are exacerbated and prevent overall improvement and discharge     Problem: Pain  Goal: Verbalizes/displays adequate comfort level or baseline comfort level  Outcome: Progressing  Flowsheets (Taken 9/26/2024 1727)  Verbalizes/displays adequate comfort level or baseline comfort level:   Administer analgesics based on type and severity of pain and evaluate response   Encourage patient to monitor pain and request assistance   Consider cultural and social influences on pain and pain management   Assess pain using appropriate pain scale   Implement non-pharmacological measures as appropriate and evaluate response   Notify Licensed Independent Practitioner if interventions unsuccessful or patient reports new pain     Problem: Safety - Adult  Goal: Free from fall injury  Outcome: Progressing  Flowsheets (Taken 9/26/2024 1727)  Free From Fall Injury: Instruct family/caregiver on patient safety     Problem: Skin/Tissue Integrity  Goal: Absence of new skin breakdown  Description: 1.  Monitor for areas of redness and/or skin breakdown  2.  Assess vascular access sites hourly  3.  Every 4-6 hours minimum:  Change oxygen saturation probe site  4.  Every 4-6 hours:  If on nasal continuous positive airway pressure, respiratory therapy assess nares and determine need for appliance change or resting

## 2024-09-26 NOTE — PROGRESS NOTES
Pt admitted to  [facility] via in a wheelchair and from ED from ED.  Complaints: N/V, abdominal pain.     IV site free of s/s of infection or infiltration. Vital signs obtained. Assessment and data collection initiated. Two nurse skin assessment performed by Haroon JOHNSTON and Letty JOHNSTON. Oriented to room. Policies and procedures for 5 explained. All questions answered with no further questions at this time. Fall prevention and safety brochure discussed with patient.  Bed alarm on. Call light in reach.Oriented to room.   Haroon Lawton, RN, RN 9/26/2024 4:25 PM     Explained patients right to have family, representative or physician notified of their admission.  Patient has Declined for physician to be notified.  Patient has Declined for family/representative to be notified.

## 2024-09-26 NOTE — ED NOTES
Pt transported to [facility] on cart in stable condition. Floor contacted before transport and spoke with Vin.

## 2024-09-26 NOTE — ED PROVIDER NOTES
None    Years of education: None    Highest education level: None   Tobacco Use    Smoking status: Former     Current packs/day: 0.00     Types: Cigarettes     Quit date: 1985     Years since quittin.1   Substance and Sexual Activity    Alcohol use: No     Alcohol/week: 0.0 standard drinks of alcohol    Drug use: No       REVIEW OF SYSTEMS       A 10 point review of systems discussed the patient and the pertinent positives and names are listed in the HPI    Except as noted above the remainder of the review of systems was reviewed and is negative.   PHYSICAL EXAM    (up to 7 for level 4, 8 or more for level 5)     ED Triage Vitals [24 0942]   BP Systolic BP Percentile Diastolic BP Percentile Temp Temp Source Pulse Respirations SpO2   (!) 161/68 -- -- 98 °F (36.7 °C) Oral 85 18 98 %      Height Weight - Scale         1.575 m (5' 2\") 130.6 kg (288 lb)             General: nontoxic appearing.  HEENT: Normocephalic/atraumatic.  Extraocular muscles are intact.    Neck: Full range of motion.    Lungs: Clear to auscultation in all lung fields.  No retractions.  No respiratory distress.  Heart: Regular rate and rhythm.  Abdomen: Obese, soft, diffusely tender at the entire abdomen.  No localized or focal tenderness.  No guarding or rebound tenderness.  Bowel sounds are noted.  Extremities: Range of motion is full.    Neurologic: Alert and oriented.  Normal motor and sensory function.  Skin: Warm, dry, free of rashes  DIAGNOSTIC RESULTS     EKG: (none if blank)  All EKG's areinterpreted by the Emergency Department Physician who either signs or Co-signs this chart in the absence of a cardiologist.        RADIOLOGY: (none if blank)   Interpretationper the Radiologist below, if available at the time of this note:    CT ABDOMEN PELVIS W IV CONTRAST Additional Contrast? None   Final Result   1. Colonic diverticulosis with pericolonic fat stranding adjacent to the mid   sigmoid colon suggests diverticulitis. No bowel

## 2024-09-26 NOTE — PROGRESS NOTES
Pharmacy Note - Extended Infusion Beta-Lactam Dose Adjustment    Piperacillin/Tazobactam 3375 mg x 1 intermittent infusion ordered for the treatment of diverticulitis with abscess. Per [facility] Extended Infusion Beta-Lactam Policy, this will be changed to 4500 mg loading dose x 1    Estimated Creatinine Clearance: Estimated Creatinine Clearance: 177 mL/min (based on SCr of 0.4 mg/dL).    Dialysis Status, DELICIA, CKD: Normal    BMI: Body mass index is 52.68 kg/m².    Rationale for Adjustment: Dose adjusted per [facility] Extended Infusion Policy based on renal function and indication. The above medication is renally eliminated and demonstrates time-dependent effects on bacterial eradication. Extended-infusion dosing strategy aims to enhance microbiologic and clinical efficacy.     Pharmacy will monitor renal function daily and adjust dose as necessary.      Please call with any questions.    Thank you,  Rebeka REYES.Ph., [facility] 9/26/2024 12:20 PM

## 2024-09-26 NOTE — ED TRIAGE NOTES
Pt to ED via self c/o fatigue and emesis. Pt states over the last month she has been having difficulty eating. States she has been vomiting consistently. Pt was seen 9/23 at [facility] and was given potassium. States her PCP advised her to come to the hospital for her symptoms. Complains of \"soreness\" in her abdomen. Rates pain 7/10. EKG completed. IV access established. Labs collected.

## 2024-09-26 NOTE — ED NOTES
Pt assisted to bathroom via wheelchair. Urine sample collected. Pt back to bed. Medications administered. No needs expressed at this time. Respirations even and unlabored. No distress noted. Call light in reach.

## 2024-09-26 NOTE — H&P
On Brulidine also on the [facility] ED if    Internal Medicine Resident History and Physical          Patient: Baylee Irizarry  : 1955  MRN: [id number]     Acct: [id number]    PCP: Myriam Sun APRN - CNP  Date of Admission: 2024  Date of Service: Pt seen/examined on 24  and Admitted to Inpatient with expected LOS greater than two midnights due to medical therapy.           Assessment and Plan:  Pericolonic fluid collection, likely abscess, diverticulitis: Irregular fluid collection, <3 cm seen on CT, elevated WBC 12.2, hemoglobin, endorses nausea and vomiting and abdominal pain.   -Continue Zosyn  -Symptom management with Zofran  -Surgery consulted  -Recommend CT in 2 days to reassess.  -Placed on clear liquid diet, carb control  Hypertension: Elevated today.  Max 184/78.  Patient unable to keep medicine down.  -Hydralazine 5 mg once  -Continue home Prinivil 40 qd  -Control nausea with Zofran above.  UTI: Urine showed many bacteria and was orange, unclear urine chemistries as there apparently is problems with the urinalysis?  Continue above Zosyn.  Likely this is being cultured.  Hypothyroid: Apparently diagnosed at [facility], per patient was supposed to be on Tapazole 10 mg but unable to keep it down  -TSH 0.005, free T43.42  -Continue Tapazole 10 mg tid  -Control nausea with Zofran above.  Leukocytosis: WBC 12.2.  Likely 2/2 above abscess.  Monitor with daily CBC.  Continue antibiotics as above.    Chronic Conditions (reviewed and stable unless otherwise stated)   DM2: Patient currently not eating a whole lot.  -Carb controlled diet, Accu-Cheks qid  -Hypoglycemia protocol  -Low-dose SSI  Asthma: Continue albuterol nebulizer q6h prn  RIRI: Continue home CPAP  Lupus: Continue home Plaquenil 200 qd    =======================================================================      Chief Complaint:  Fatigue and emesis    History Of Present Illness:  Baylee Irizarry is a 69          **This report has been created using voice recognition software.  It may contain   minor errors which are inherent in voice recognition technology.**      Electronically signed by Dr. Kenia Sesay             EKG:  I have reviewed the EKG with the following interpretation: NSR      PT/OT Eval Status: will not be assessed  Diet: No diet orders on file  DVT prophylaxis: Lovenox  Code Status: No Order  Disposition: admit to [facility]    Thank you Myriam Sun, APRN - [place] for the opportunity to be involved in this patient's care.    Electronically signed by Shun Solitario DO on 9/26/2024 at 1:46 PM.     Case discussed with Attending, Dr. Behl.

## 2024-09-26 NOTE — ED NOTES
ED to inpatient nurses report      Chief Complaint:  Chief Complaint   Patient presents with    Fatigue    Emesis     Present to ED from: home    MOA:     LOC: alert and orientated to name, place, date  Mobility: Requires assistance * 1  Oxygen Baseline: RA    Current needs required: RA     Code Status:   No Order    What abnormal results were found and what did you give/do to treat them? CT results  Any procedures or intervention occur? Antibiotics    Mental Status:  Level of Consciousness: Alert (0)    Psych Assessment:        Vitals:  Patient Vitals for the past 24 hrs:   BP Temp Temp src Pulse Resp SpO2 Height Weight   09/26/24 1325 123/83 -- -- 90 18 98 % -- --   09/26/24 1114 (!) 180/66 -- -- 92 11 98 % -- --   09/26/24 0942 (!) 161/68 98 °F (36.7 °C) Oral 85 18 98 % 1.575 m (5' 2\") 130.6 kg (288 lb)        LDAs:   Peripheral IV 09/26/24 Posterior;Right Hand (Active)   Site Assessment Clean, dry & intact 09/26/24 0954   Line Status Brisk blood return;Normal saline locked;Flushed;Specimen collected 09/26/24 0954   Phlebitis Assessment No symptoms 09/26/24 0954   Infiltration Assessment 0 09/26/24 0954   Dressing Status New dressing applied;Clean, dry & intact 09/26/24 0954   Dressing Type Transparent 09/26/24 0954   Dressing Intervention New 09/26/24 0954       Ambulatory Status:  Presents to emergency department  because of falls (Syncope, seizure, or loss of consciousness): No, Age > 70: No, Altered Mental Status, Intoxication with alcohol or substance confusion (Disorientation, impaired judgment, poor safety awaremess, or inability to follow instructions): No, Impaired Mobility: Ambulates or transfers with assistive devices or assistance; Unable to ambulate or transer.: Yes, Nursing Judgement: No    Diagnosis:  DISPOSITION Decision To Admit 09/26/2024 01:37:43 PM   Final diagnoses:   Diverticulitis of large intestine with abscess, unspecified bleeding status        Consults:  None     Pain Score:  Pain

## 2024-09-26 NOTE — ED NOTES
Pt assisted to bathroom via wheelchair. Patient resting in bed. Respirations easy and unlabored. No distress noted. Call light within reach.

## 2024-09-27 PROBLEM — K57.20 DIVERTICULITIS OF LARGE INTESTINE WITH ABSCESS: Status: ACTIVE | Noted: 2024-09-27

## 2024-09-27 LAB
ALBUMIN SERPL BCG-MCNC: 2.4 G/DL (ref 3.5–5.1)
ALP SERPL-CCNC: 110 U/L (ref 38–126)
ALT SERPL W/O P-5'-P-CCNC: 28 U/L (ref 11–66)
ANION GAP SERPL CALC-SCNC: 9 MEQ/L (ref 8–16)
AST SERPL-CCNC: 37 U/L (ref 5–40)
BACTERIA UR CULT: ABNORMAL
BASOPHILS ABSOLUTE: 0 THOU/MM3 (ref 0–0.1)
BASOPHILS NFR BLD AUTO: 0.2 %
BILIRUB CONJ SERPL-MCNC: 0.7 MG/DL (ref 0.1–13.8)
BILIRUB SERPL-MCNC: 1.1 MG/DL (ref 0.3–1.2)
BUN SERPL-MCNC: 13 MG/DL (ref 7–22)
CA-I BLD ISE-SCNC: 1.13 MMOL/L (ref 1.12–1.32)
CALCIUM SERPL-MCNC: 8.2 MG/DL (ref 8.5–10.5)
CHLORIDE SERPL-SCNC: 103 MEQ/L (ref 98–111)
CO2 SERPL-SCNC: 27 MEQ/L (ref 23–33)
CREAT SERPL-MCNC: 0.4 MG/DL (ref 0.4–1.2)
DEPRECATED RDW RBC AUTO: 38.7 FL (ref 35–45)
EOSINOPHIL NFR BLD AUTO: 0 %
EOSINOPHILS ABSOLUTE: 0 THOU/MM3 (ref 0–0.4)
ERYTHROCYTE [DISTWIDTH] IN BLOOD BY AUTOMATED COUNT: 13.8 % (ref 11.5–14.5)
GFR SERPL CREATININE-BSD FRML MDRD: > 90 ML/MIN/1.73M2
GLUCOSE BLD STRIP.AUTO-MCNC: 109 MG/DL (ref 70–108)
GLUCOSE BLD STRIP.AUTO-MCNC: 110 MG/DL (ref 70–108)
GLUCOSE BLD STRIP.AUTO-MCNC: 115 MG/DL (ref 70–108)
GLUCOSE BLD STRIP.AUTO-MCNC: 125 MG/DL (ref 70–108)
GLUCOSE SERPL-MCNC: 114 MG/DL (ref 70–108)
HCT VFR BLD AUTO: 33.1 % (ref 37–47)
HGB BLD-MCNC: 10 GM/DL (ref 12–16)
IMM GRANULOCYTES # BLD AUTO: 0.03 THOU/MM3 (ref 0–0.07)
IMM GRANULOCYTES NFR BLD AUTO: 0.3 %
LYMPHOCYTES ABSOLUTE: 1.6 THOU/MM3 (ref 1–4.8)
LYMPHOCYTES NFR BLD AUTO: 15.8 %
MAGNESIUM SERPL-MCNC: 1.3 MG/DL (ref 1.6–2.4)
MCH RBC QN AUTO: 23.4 PG (ref 26–33)
MCHC RBC AUTO-ENTMCNC: 30.2 GM/DL (ref 32.2–35.5)
MCV RBC AUTO: 77.3 FL (ref 81–99)
MONOCYTES ABSOLUTE: 1.2 THOU/MM3 (ref 0.4–1.3)
MONOCYTES NFR BLD AUTO: 12 %
NEUTROPHILS ABSOLUTE: 7.2 THOU/MM3 (ref 1.8–7.7)
NEUTROPHILS NFR BLD AUTO: 71.7 %
NRBC BLD AUTO-RTO: 0 /100 WBC
ORGANISM: ABNORMAL
PHOSPHATE SERPL-MCNC: 3.3 MG/DL (ref 2.4–4.7)
PLATELET # BLD AUTO: 213 THOU/MM3 (ref 130–400)
PMV BLD AUTO: 12.9 FL (ref 9.4–12.4)
POTASSIUM SERPL-SCNC: 3.5 MEQ/L (ref 3.5–5.2)
PROT SERPL-MCNC: 5.4 G/DL (ref 6.1–8)
RBC # BLD AUTO: 4.28 MILL/MM3 (ref 4.2–5.4)
SODIUM SERPL-SCNC: 139 MEQ/L (ref 135–145)
T3 TOTAL: 183 NG/DL (ref 80–200)
T3FREE SERPL-MCNC: 5.4 PG/ML (ref 2–4.4)
WBC # BLD AUTO: 10.1 THOU/MM3 (ref 4.8–10.8)

## 2024-09-27 PROCEDURE — 6370000000 HC RX 637 (ALT 250 FOR IP): Performed by: STUDENT IN AN ORGANIZED HEALTH CARE EDUCATION/TRAINING PROGRAM

## 2024-09-27 PROCEDURE — 6360000002 HC RX W HCPCS: Performed by: STUDENT IN AN ORGANIZED HEALTH CARE EDUCATION/TRAINING PROGRAM

## 2024-09-27 PROCEDURE — 82248 BILIRUBIN DIRECT: CPT

## 2024-09-27 PROCEDURE — 80053 COMPREHEN METABOLIC PANEL: CPT

## 2024-09-27 PROCEDURE — 82330 ASSAY OF CALCIUM: CPT

## 2024-09-27 PROCEDURE — 99232 SBSQ HOSP IP/OBS MODERATE 35: CPT | Performed by: SURGERY

## 2024-09-27 PROCEDURE — 6360000002 HC RX W HCPCS: Performed by: PHARMACIST

## 2024-09-27 PROCEDURE — 36415 COLL VENOUS BLD VENIPUNCTURE: CPT

## 2024-09-27 PROCEDURE — 85025 COMPLETE CBC W/AUTO DIFF WBC: CPT

## 2024-09-27 PROCEDURE — 6360000002 HC RX W HCPCS: Performed by: NURSE PRACTITIONER

## 2024-09-27 PROCEDURE — 99232 SBSQ HOSP IP/OBS MODERATE 35: CPT | Performed by: NURSE PRACTITIONER

## 2024-09-27 PROCEDURE — 1200000003 HC TELEMETRY R&B

## 2024-09-27 PROCEDURE — 2580000003 HC RX 258: Performed by: STUDENT IN AN ORGANIZED HEALTH CARE EDUCATION/TRAINING PROGRAM

## 2024-09-27 PROCEDURE — 83735 ASSAY OF MAGNESIUM: CPT

## 2024-09-27 PROCEDURE — 82948 REAGENT STRIP/BLOOD GLUCOSE: CPT

## 2024-09-27 PROCEDURE — 84100 ASSAY OF PHOSPHORUS: CPT

## 2024-09-27 RX ORDER — MAGNESIUM SULFATE IN WATER 40 MG/ML
4000 INJECTION, SOLUTION INTRAVENOUS ONCE
Status: COMPLETED | OUTPATIENT
Start: 2024-09-27 | End: 2024-09-28

## 2024-09-27 RX ORDER — HYDRALAZINE HYDROCHLORIDE 20 MG/ML
10 INJECTION INTRAMUSCULAR; INTRAVENOUS EVERY 6 HOURS PRN
Status: DISCONTINUED | OUTPATIENT
Start: 2024-09-27 | End: 2024-10-08 | Stop reason: HOSPADM

## 2024-09-27 RX ORDER — CALCIUM CARBONATE 500 MG/1
500 TABLET, CHEWABLE ORAL 3 TIMES DAILY PRN
Status: DISCONTINUED | OUTPATIENT
Start: 2024-09-27 | End: 2024-10-08 | Stop reason: HOSPADM

## 2024-09-27 RX ORDER — METOCLOPRAMIDE HYDROCHLORIDE 5 MG/ML
10 INJECTION INTRAMUSCULAR; INTRAVENOUS EVERY 6 HOURS
Status: DISCONTINUED | OUTPATIENT
Start: 2024-09-27 | End: 2024-10-08 | Stop reason: HOSPADM

## 2024-09-27 RX ORDER — LEUCINE, PHENYLALANINE, LYSINE, METHIONINE, ISOLEUCINE, VALINE, HISTIDINE, THREONINE, TRYPTOPHAN, ALANINE, GLYCINE, ARGININE, PROLINE, SERINE, TYROSINE, DEXTROSE 311; 238; 247; 170; 255; 247; 204; 179; 77; 880; 438; 489; 289; 213; 17; 5 MG/100ML; MG/100ML; MG/100ML; MG/100ML; MG/100ML; MG/100ML; MG/100ML; MG/100ML; MG/100ML; MG/100ML; MG/100ML; MG/100ML; MG/100ML; MG/100ML; MG/100ML; G/100ML
85 INJECTION INTRAVENOUS
Status: DISPENSED | OUTPATIENT
Start: 2024-09-27 | End: 2024-09-28

## 2024-09-27 RX ORDER — POTASSIUM CHLORIDE 7.45 MG/ML
10 INJECTION INTRAVENOUS
Status: DISCONTINUED | OUTPATIENT
Start: 2024-09-27 | End: 2024-09-27

## 2024-09-27 RX ADMIN — ENOXAPARIN SODIUM 30 MG: 100 INJECTION SUBCUTANEOUS at 08:14

## 2024-09-27 RX ADMIN — ENOXAPARIN SODIUM 30 MG: 100 INJECTION SUBCUTANEOUS at 20:43

## 2024-09-27 RX ADMIN — SODIUM CHLORIDE, PRESERVATIVE FREE 10 ML: 5 INJECTION INTRAVENOUS at 08:12

## 2024-09-27 RX ADMIN — PROCHLORPERAZINE EDISYLATE 10 MG: 5 INJECTION INTRAMUSCULAR; INTRAVENOUS at 06:34

## 2024-09-27 RX ADMIN — PROCHLORPERAZINE EDISYLATE 10 MG: 5 INJECTION INTRAMUSCULAR; INTRAVENOUS at 14:08

## 2024-09-27 RX ADMIN — LISINOPRIL 40 MG: 40 TABLET ORAL at 08:14

## 2024-09-27 RX ADMIN — HYDROXYCHLOROQUINE SULFATE 200 MG: 200 TABLET ORAL at 08:14

## 2024-09-27 RX ADMIN — METOCLOPRAMIDE 10 MG: 5 INJECTION, SOLUTION INTRAMUSCULAR; INTRAVENOUS at 23:29

## 2024-09-27 RX ADMIN — HYDRALAZINE HYDROCHLORIDE 10 MG: 20 INJECTION INTRAMUSCULAR; INTRAVENOUS at 10:57

## 2024-09-27 RX ADMIN — ONDANSETRON 4 MG: 2 INJECTION INTRAMUSCULAR; INTRAVENOUS at 02:31

## 2024-09-27 RX ADMIN — HYDRALAZINE HYDROCHLORIDE 10 MG: 20 INJECTION INTRAMUSCULAR; INTRAVENOUS at 17:21

## 2024-09-27 RX ADMIN — PIPERACILLIN AND TAZOBACTAM 4500 MG: 4; .5 INJECTION, POWDER, FOR SOLUTION INTRAVENOUS at 12:22

## 2024-09-27 RX ADMIN — SODIUM CHLORIDE, PRESERVATIVE FREE 10 ML: 5 INJECTION INTRAVENOUS at 20:42

## 2024-09-27 RX ADMIN — MAGNESIUM SULFATE HEPTAHYDRATE 4000 MG: 40 INJECTION, SOLUTION INTRAVENOUS at 21:32

## 2024-09-27 RX ADMIN — ONDANSETRON 4 MG: 2 INJECTION INTRAMUSCULAR; INTRAVENOUS at 20:04

## 2024-09-27 RX ADMIN — METHIMAZOLE 10 MG: 10 TABLET ORAL at 08:12

## 2024-09-27 RX ADMIN — PIPERACILLIN AND TAZOBACTAM 3375 MG: 3; .375 INJECTION, POWDER, FOR SOLUTION INTRAVENOUS at 04:03

## 2024-09-27 RX ADMIN — PIPERACILLIN AND TAZOBACTAM 4500 MG: 4; .5 INJECTION, POWDER, FOR SOLUTION INTRAVENOUS at 20:41

## 2024-09-27 RX ADMIN — METHIMAZOLE 10 MG: 10 TABLET ORAL at 20:31

## 2024-09-27 RX ADMIN — POTASSIUM BICARBONATE 40 MEQ: 782 TABLET, EFFERVESCENT ORAL at 20:44

## 2024-09-27 RX ADMIN — ONDANSETRON 4 MG: 2 INJECTION INTRAMUSCULAR; INTRAVENOUS at 08:41

## 2024-09-27 RX ADMIN — METHIMAZOLE 10 MG: 10 TABLET ORAL at 14:46

## 2024-09-27 RX ADMIN — DICLOFENAC SODIUM 2 G: 10 GEL TOPICAL at 08:11

## 2024-09-27 RX ADMIN — PROCHLORPERAZINE EDISYLATE 10 MG: 5 INJECTION INTRAMUSCULAR; INTRAVENOUS at 21:40

## 2024-09-27 RX ADMIN — METOCLOPRAMIDE 10 MG: 5 INJECTION, SOLUTION INTRAMUSCULAR; INTRAVENOUS at 18:13

## 2024-09-27 ASSESSMENT — PAIN SCALES - GENERAL
PAINLEVEL_OUTOF10: 0
PAINLEVEL_OUTOF10: 0

## 2024-09-27 NOTE — CONSULTS
Chief Complaint:  N/V, weight loss    History of present Illness: Baylee Irizarry is a 69 y.o. female, new to the practice, admitted on 9/26/24 with acute diverticulitis.  She reported to the Attending provider that she has had N/V for the past month or two and has lost significant weight prompting a GI consult.  The patient reports to this examiner that she has lost \"about 100 lb\" in the past year unintentionally.  She reports for the past 2 months she has \"vomited within 5-10 minutes of swallowing anything.\"  She states she has not been able to keep food, liquid or medications down.  She did not seek medical evaluation for this before now.  She was diagnosed with hyperthyroidism by her PCP and is trying to get in with an endocrinologist.  T4 this admission was 3.42 with TSH 0.005.    She is also diabetic since age 20s.  She states her last Hbg A1C was 5.5.  She is not taking GLP-1 for diabetes.  She denies any recent changes in her diet or medications prior to the onset of her symptoms.  She denies chest pain.  She reports her abdomen is \"sore\" from vomiting.  The patient is noted to be clearing her throat and expectorating phlegm during our evaluation.  She is sipping on fluids during our interaction; no vomiting observed.  She denies hematemesis or coffee ground emesis.  She denies GERD.  She reports \"some\" difficulty swallowing but denies food impaction.  As previously mentioned, she vomits within 5-10 minutes of eating or drinking.  She complains of early satiety over the past few months.  She reports having irregular bowel habits prior to admission with both constipation and diarrhea.  She states with her decreased po intake over the past few months, she has not been defecating as much.  She denies melena or hematochezia.  She has never had an EGD or colonoscopy.  She reports her mother had cancer in her \"stomach\" but cannot tell me the specific type of cancer she had.      Past Medical History:  has a

## 2024-09-27 NOTE — PLAN OF CARE
Problem: Chronic Conditions and Co-morbidities  Goal: Patient's chronic conditions and co-morbidity symptoms are monitored and maintained or improved  9/27/2024 1245 by Hailey Gonzalez RN  Outcome: Progressing  Flowsheets (Taken 9/27/2024 1245)  Care Plan - Patient's Chronic Conditions and Co-Morbidity Symptoms are Monitored and Maintained or Improved:   Monitor and assess patient's chronic conditions and comorbid symptoms for stability, deterioration, or improvement   Collaborate with multidisciplinary team to address chronic and comorbid conditions and prevent exacerbation or deterioration     Problem: Pain  Goal: Verbalizes/displays adequate comfort level or baseline comfort level  9/27/2024 1245 by Hailey Gonzalez RN  Outcome: Progressing  Flowsheets (Taken 9/27/2024 1245)  Verbalizes/displays adequate comfort level or baseline comfort level:   Encourage patient to monitor pain and request assistance   Administer analgesics based on type and severity of pain and evaluate response   Assess pain using appropriate pain scale   Implement non-pharmacological measures as appropriate and evaluate response     Problem: Safety - Adult  Goal: Free from fall injury  9/27/2024 1245 by Hailey Gonzalez RN  Outcome: Progressing  Flowsheets (Taken 9/27/2024 1245)  Free From Fall Injury: Instruct family/caregiver on patient safety     Problem: Skin/Tissue Integrity  Goal: Absence of new skin breakdown  Description: 1.  Monitor for areas of redness and/or skin breakdown  2.  Assess vascular access sites hourly  3.  Every 4-6 hours minimum:  Change oxygen saturation probe site  4.  Every 4-6 hours:  If on nasal continuous positive airway pressure, respiratory therapy assess nares and determine need for appliance change or resting period.  9/27/2024 1245 by Hailey Gonzalez RN  Outcome: Progressing  Note: Skin assessment completed.  Patient turned every 2 hours and as needed.  No skin breakdown this shift.         Problem:  Gastrointestinal - Adult  Goal: Minimal or absence of nausea and vomiting  9/27/2024 1245 by Hailey Gonzalez RN  Outcome: Progressing  Flowsheets (Taken 9/27/2024 1245)  Minimal or absence of nausea and vomiting:   Provide nonpharmacologic comfort measures as appropriate   Maintain NPO status until nausea and vomiting are resolved   Administer ordered antiemetic medications as needed     Problem: Gastrointestinal - Adult  Goal: Maintains or returns to baseline bowel function  9/27/2024 1245 by Hailey Gonzalez RN  Outcome: Progressing  Flowsheets (Taken 9/27/2024 1245)  Maintains or returns to baseline bowel function:   Assess bowel function   Administer ordered medications as needed     Problem: Discharge Planning  Goal: Discharge to home or other facility with appropriate resources  9/27/2024 1245 by Hailey Gonzalez RN  Outcome: Progressing  Flowsheets (Taken 9/27/2024 1245)  Discharge to home or other facility with appropriate resources:   Identify barriers to discharge with patient and caregiver   Arrange for needed discharge resources and transportation as appropriate   Care plan reviewed with patient.  Patient verbalizes understanding of the plan of care and contributes to goal setting.

## 2024-09-27 NOTE — PLAN OF CARE
Problem: Chronic Conditions and Co-morbidities  Goal: Patient's chronic conditions and co-morbidity symptoms are monitored and maintained or improved  9/26/2024 1727 by Haroon Lawton RN  Outcome: Progressing  Flowsheets (Taken 9/26/2024 1727)  Care Plan - Patient's Chronic Conditions and Co-Morbidity Symptoms are Monitored and Maintained or Improved:   Monitor and assess patient's chronic conditions and comorbid symptoms for stability, deterioration, or improvement   Collaborate with multidisciplinary team to address chronic and comorbid conditions and prevent exacerbation or deterioration   Update acute care plan with appropriate goals if chronic or comorbid symptoms are exacerbated and prevent overall improvement and discharge     Problem: Chronic Conditions and Co-morbidities  Goal: Patient's chronic conditions and co-morbidity symptoms are monitored and maintained or improved  9/27/2024 0150 by Tammi Trevino RN  Outcome: Progressing  Flowsheets (Taken 9/27/2024 0150)  Care Plan - Patient's Chronic Conditions and Co-Morbidity Symptoms are Monitored and Maintained or Improved: Monitor and assess patient's chronic conditions and comorbid symptoms for stability, deterioration, or improvement     Problem: Pain  Goal: Verbalizes/displays adequate comfort level or baseline comfort level  9/27/2024 0150 by Tammi Trevino RN  Outcome: Progressing  Flowsheets (Taken 9/27/2024 0150)  Verbalizes/displays adequate comfort level or baseline comfort level:   Encourage patient to monitor pain and request assistance   Assess pain using appropriate pain scale   Administer analgesics based on type and severity of pain and evaluate response     Problem: Safety - Adult  Goal: Free from fall injury  9/27/2024 0150 by Tammi Trevino RN  Outcome: Progressing  Flowsheets (Taken 9/27/2024 0150)  Free From Fall Injury: Instruct family/caregiver on patient safety  Note: Patient bed alarm is on at this time.     Problem:

## 2024-09-27 NOTE — PROCEDURES
PROCEDURE NOTE  Date: 9/26/2024   Name: Baylee Batesmistyluma  YOB: 1955    Procedures  12 lead EKG completed. Results handed to Adriana JOHNSTON.

## 2024-09-27 NOTE — PROGRESS NOTES
Pharmacy Consult for TPN/PPN Initiation    Assessment:   Indication for TPN: N/V- unknown etiology last 1-2 months, weight loss, NPO except sips  IV access: peripheral  Dosing weight: 70.2 kg    Plan:  10 kcal/kg = 702 kcal  Clinimix 4.25/5 at a rate of 85 mL/hr will provide 694 kcal.   Will plan to convert to 3-in-1 TPN on 9/29/24 as appropriate.    Electrolyte Replacement:   Potassium chloride: 40 mEq IV  Magnesium sulfate: 4 g    BMP, Mag, iCal, & Phos ordered for tomorrow with AM labs.    Pharmacy will continue to follow. Thank you for the consult.    Emma Lua PharmD 9/27/2024 7:04 PM

## 2024-09-27 NOTE — CARE COORDINATION
Case Management Assessment Initial Evaluation    Date/Time of Evaluation: 2024 8:39 AM  Assessment Completed by: Julianne Mccartney RN    If patient is discharged prior to next notation, then this note serves as note for discharge by case management.    Patient Name: Baylee Irizarry                   YOB: 1955  Diagnosis: Diverticulitis [K57.92]  Diverticulitis of large intestine with abscess, unspecified bleeding status [K57.20]                   Date / Time: 2024  9:34 AM  [facility]     Patient Admission Status: Inpatient   Readmission Risk Low 0-14, Mod 15-19), High > 20: Readmission Risk Score: 12.2    Current PCP: Myriam Sun APRN - CNP  Health Care Decision Makers:     Additional Case Management Notes: Patient presented to ED with difficulty eating, abdominal pain and emesis x 1 month.     Admitted by hospitalist with consult to general surgery. WBC 12.2. IV Zosyn. PRN IV Zofran. PRM IM compazine. Clear liquid diet. Gen surgery monitoring and plans to repeat CT A/P after a few days of conservative treatment. Possible consult to GI to come.     Procedures: n/a    Imagin/26 CT A/P:  Colonic diverticulosis with pericolonic fat stranding adjacent to the mid sigmoid colon suggests diverticulitis. No bowel obstruction. Ill-defined pockets of fluid along the lateral margin of the sigmoid colon measure up to 23 mm in diameter, suspicious for vijaya diverticular abscess. The fat stranding surrounding the sigmoid colon also surrounds the urinary bladder and the urinary bladder wall appears thickened which could indicate infectious/inflammatory cystitis secondary to diverticulitis. Correlate with urinalysis. A somewhat elongated collection of fluid in the midline may represent a second vijaya diverticular abscess. It appears to extend along the post hysterectomy portion of the pelvis.    CXR:   Chronic bronchitis versus viral bronchiolitis subtly suggested in the upper

## 2024-09-27 NOTE — PROGRESS NOTES
[facility] DR GENERAL SURGERY  General Surgery Postoperative Progress Note    Pt Name: Baylee Irizarry  Medical Record Number: [id number]  Date of Birth 1955   Today's Date: 9/27/2024    CC:  Chief Complaint   Patient presents with    Fatigue    Emesis       ASSESSMENT   HD # 1  Continues to have nausea and vomiting which has been present for a month which would be extremely unlikely to be due to the current episode of diverticulitis that she has.  She is diabetic and maybe she has diabetic gastroparesis or some other etiology so consideration for GI consult for evaluation for nausea vomiting has been present for 1 month  She has had a prior cholecystectomy even though is not listed as a prior surgery  White count is normal this morning she is passing flatus  Has been afebrile since midnight  PLAN   Continue current IV antibiotics  Cannot believe that her nausea and vomiting for 1 month prior is related to her current episode of diverticulitis consider GI consult for evaluation  Keep diet as is until better control of nausea vomiting or etiology identified.  Will repeat the CT scan in 3 or 4 days from her last 1 depending on her clinical course to evaluate the progression or resolution of these poorly defined fluid collections  SUBJECTIVE   Baylee is doing ok.  She said she was up and down all night just could not get comfortable.  Said she drank some liquids but had nausea vomiting last evening.  CURRENT MEDICATIONS   Scheduled Meds:   sodium chloride flush  5-40 mL IntraVENous 2 times per day    diclofenac sodium  2 g Topical BID    hydroxychloroquine  200 mg Oral Daily    lisinopril  40 mg Oral Daily    montelukast  10 mg Oral Nightly    methIMAzole  10 mg Oral TID    enoxaparin  30 mg SubCUTAneous BID    piperacillin-tazobactam  3,375 mg IntraVENous q8h    insulin lispro  0-4 Units SubCUTAneous Q6H     Continuous Infusions:   sodium chloride      dextrose       PRN Meds:.sodium  Chronic findings are discussed.         **This report has been created using voice recognition software.  It may contain   minor errors which are inherent in voice recognition technology.**      Electronically signed by Dr. Kenia Sesay        XR CHEST (2 VW)    Result Date: 9/27/2024  Impression: Chronic bronchitis versus viral bronchiolitis subtly suggested in the upper lobes predominantly see above for discussion. This document has been electronically signed by: Eduardo Pedroza III, MD on 09/27/2024 03:20 AM    CT ABDOMEN PELVIS W IV CONTRAST Additional Contrast? None    Result Date: 9/26/2024  1. Colonic diverticulosis with pericolonic fat stranding adjacent to the mid sigmoid colon suggests diverticulitis. No bowel obstruction is identified. No free air is observed. Ill-defined pockets of fluid along the lateral margin of the sigmoid colon measure up to 23 mm in diameter (series 301, image 61) suspicious for peridiverticular abscess. The fat stranding surrounding the sigmoid colon also surrounds the urinary bladder and the urinary bladder wall appears thickened which could indicate infectious/inflammatory cystitis secondary to diverticulitis. Correlate with urinalysis. A somewhat elongated collection of fluid in the midline (series 301, image 68) may represent a second peridiverticular abscess. It appears to extend along the post hysterectomy portion of the pelvis. Continued follow-up to ensure resolution is advised. 2. Chronic findings are discussed. **This report has been created using voice recognition software.  It may contain minor errors which are inherent in voice recognition technology.** Electronically signed by Dr. Kenia Sesay    Electronically signed by Shun Mejia MD on 9/27/2024 at 7:35 AM

## 2024-09-27 NOTE — PROGRESS NOTES
Report received from primary nurse.  B/P- 140/68  O2 saturation- 98% on room air  HR-98  Temp- 97.4  Respirations- 25   Pain- 3 out of 10 patient states I feel sore everywhere.  Patient is alert and oriented x4. Speech is clear and appropriate. Eyes are round and reactive to light with consensual response 3mm to 2mm. Skin is pink, warm and dry to the touch. Lungs are clear. Abdomen is none distended. Bowel sounds are present in all 4 quadrents. Bi lateral Mild edema noted: on lower calves. IV on left hand no redness or swelling. Telemetry intact. Patient is NPO allowed sips of clear liquid. Patient is in chair with chair alarm and call light is in reach. Hourly rounds continue.

## 2024-09-27 NOTE — PROGRESS NOTES
Hospitalist Progress Note    Patient:  Baylee Irizarry      Unit/Bed:5K-11/011-A    YOB: 1955    MRN: [id number]       Acct: [id number]     PCP: Myriam Sun APRN - CNP    Date of Admission: 9/26/2024    Assessment/Plan:    Pericolonic fluid collection, possible abscess, diverticulitis: Irregular fluid collection, <3 cm seen on CT, elevated WBC 12.2, hemoglobin, endorses nausea and vomiting and abdominal pain. Continue IV Zosyn. IV Zofran / Compazine PRN. NPO. General surgery following. No surgical intervention planned. Plans to repeat imaging in 3-4 days pending clinical progress. Dr. Mejia recommending GI evaluation for ongoing n/v for 4 weeks pta. Not felt to be related to possible abscess.   Accelerated Hypertension: s/p IV Hydralazine 5 mg once on admission. Start IV Hydralazing 10 mg every 6 hrs PRN SBP >170. Home ACE continued. Held HCTZ.  UTI(poa): Urine showed many bacteria and was orange. Continue above Zosyn. Await final culture and sensitivity.   Hyperthyroidism, uncontrolled. Unable to keep orals down. TSH 0.005, free T4 3.42. Continue Tapazole 10 mg tid  Microcytic anemia. Hbg 10.   Leukocytosis: resolved.    T2DM: BS trend in goal. Accu-Cheks qid. Hypoglycemia protocol Low-dose SSI.  Asthma: Continue albuterol nebulizer q6h prn  RIRI: Continue home CPAP  Lupus: Continue home Plaquenil 200 qd.  Morbid obesity. BMI 52.68.      Chief Complaint: Fatigue and emesis     Hospital Course:     Baylee Irizarry is a 69 y.o. female with PMHx of Anemia, Anxiety, Asthma, CHF, COPD, MDD, DM2, HA, Lupus, HTN, Obesity, RIRI who presents to [facility] ED with CC:1 month emesis and difficulty eating. On 9/23 given potassium at [facility], PCP advised her to come in. C/o abdominal soreness. Pain 7/10,   [facility] said thyroid problem. Denied fever no chills, feels cold. Denies urinary complaints,   On interview today patient states that she has been unable to keep much down for the past month.  She  Sips of Clear Liquids    Exam:  BP (!) 176/89   Pulse 94   Temp 97.7 °F (36.5 °C) (Oral)   Resp 18   Ht 1.575 m (5' 2\")   Wt 130.6 kg (288 lb)   SpO2 96%   BMI 52.68 kg/m²     General appearance: No apparent distress, appears stated age and cooperative.  HEENT: Pupils equal, round, and reactive to light. Conjunctivae/corneas clear.  Neck: Supple, with full range of motion. No jugular venous distention. Trachea midline.  Respiratory:  Normal respiratory effort. Clear to auscultation, bilaterally without Rales/Wheezes/Rhonchi.  Cardiovascular: Regular rate and rhythm with normal S1/S2 without murmurs, rubs or gallops.  Abdomen: Soft, obese, non-tender, non-distended with normal bowel sounds.  Musculoskeletal: passive and active ROM x 4 extremities.  Skin: Skin color, texture, turgor normal.  No rashes or lesions.  Neurologic:  Neurovascularly intact without any focal sensory/motor deficits. Cranial nerves: II-XII intact, grossly non-focal.  Psychiatric: Alert and oriented, thought content appropriate, normal insight  Capillary Refill: Brisk,< 3 seconds   Peripheral Pulses: +2 palpable, equal bilaterally       Labs:   Recent Labs     09/26/24  0950 09/27/24  0458   WBC 12.2* 10.1   HGB 11.6* 10.0*   HCT 37.2 33.1*    213     Recent Labs     09/26/24  1033 09/27/24  0458    139   K 4.2 3.5    103   CO2 29 27   BUN 14 13   CREATININE 0.4 0.4   CALCIUM 9.0 8.2*     Recent Labs     09/26/24  1033 09/27/24  0458   AST 41* 37   ALT 33 28   BILIDIR  --  0.7   BILITOT 1.3* 1.1   ALKPHOS 132* 110     No results for input(s): \"INR\" in the last 72 hours.  No results for input(s): \"CKTOTAL\", \"TROPONINI\" in the last 72 hours.    Urinalysis:      Lab Results   Component Value Date/Time    NITRU see below 09/26/2024 11:00 AM    WBCUA 15-25 09/26/2024 11:00 AM    BACTERIA MANY 09/26/2024 11:00 AM    RBCUA 3-5 09/26/2024 11:00 AM    BLOODU see below 09/26/2024 11:00 AM    GLUCOSEU see below 09/26/2024 11:00 AM

## 2024-09-27 NOTE — PROGRESS NOTES
Glucose- 115  Hourly rounding continued. Patient is in bed watching television. No pain or concerns expressed. IV site is not red or swollen. Will continue to check hourly.

## 2024-09-27 NOTE — PROGRESS NOTES
Pharmacy Note - Extended Infusion Beta-Lactam Dose Adjustment    Piperacillin/Tazobactam 3375 mg q8h extended infusion ordered for the treatment of Intra-abdominal Infection. Per [facility] Extended Infusion Beta-Lactam Policy, this will be changed to 4500 mg q8h extended infusion for BMI > 40    Estimated Creatinine Clearance: Estimated Creatinine Clearance: 177 mL/min (based on SCr of 0.4 mg/dL).    BMI: Body mass index is 52.68 kg/m².    Rationale for Adjustment: Dose adjusted per [facility] Extended Infusion Policy based on renal function and indication. The above medication is renally eliminated and demonstrates time-dependent effects on bacterial eradication. Extended-infusion dosing strategy aims to enhance microbiologic and clinical efficacy.     Pharmacy will monitor renal function daily and adjust dose as necessary.      Please call with any questions.    Thank you,    Haim Nash, PharmD, BCPS  9/27/2024  7:45 AM

## 2024-09-28 ENCOUNTER — APPOINTMENT (OUTPATIENT)
Dept: GENERAL RADIOLOGY | Age: 69
DRG: 357 | End: 2024-09-28
Payer: MEDICARE

## 2024-09-28 PROBLEM — J45.909 UNCOMPLICATED ASTHMA: Status: ACTIVE | Noted: 2024-09-28

## 2024-09-28 PROBLEM — D72.829 LEUKOCYTOSIS: Status: ACTIVE | Noted: 2024-09-28

## 2024-09-28 PROBLEM — N30.00 ACUTE CYSTITIS WITHOUT HEMATURIA: Status: ACTIVE | Noted: 2024-09-28

## 2024-09-28 PROBLEM — E44.0 MODERATE MALNUTRITION (HCC): Chronic | Status: ACTIVE | Noted: 2024-09-28

## 2024-09-28 PROBLEM — G47.33 OSA (OBSTRUCTIVE SLEEP APNEA): Status: ACTIVE | Noted: 2024-09-28

## 2024-09-28 LAB
ALBUMIN SERPL BCG-MCNC: 2.5 G/DL (ref 3.5–5.1)
ALP SERPL-CCNC: 125 U/L (ref 38–126)
ALT SERPL W/O P-5'-P-CCNC: 31 U/L (ref 11–66)
ANION GAP SERPL CALC-SCNC: 13 MEQ/L (ref 8–16)
AST SERPL-CCNC: 40 U/L (ref 5–40)
BASOPHILS ABSOLUTE: 0 THOU/MM3 (ref 0–0.1)
BASOPHILS NFR BLD AUTO: 0.2 %
BILIRUB CONJ SERPL-MCNC: 0.7 MG/DL (ref 0.1–13.8)
BILIRUB SERPL-MCNC: 1.1 MG/DL (ref 0.3–1.2)
BUN SERPL-MCNC: 14 MG/DL (ref 7–22)
CA-I BLD ISE-SCNC: 1.14 MMOL/L (ref 1.12–1.32)
CALCIUM SERPL-MCNC: 8.3 MG/DL (ref 8.5–10.5)
CHLORIDE SERPL-SCNC: 100 MEQ/L (ref 98–111)
CO2 SERPL-SCNC: 24 MEQ/L (ref 23–33)
CREAT SERPL-MCNC: 0.4 MG/DL (ref 0.4–1.2)
DEPRECATED RDW RBC AUTO: 38.4 FL (ref 35–45)
EOSINOPHIL NFR BLD AUTO: 0 %
EOSINOPHILS ABSOLUTE: 0 THOU/MM3 (ref 0–0.4)
ERYTHROCYTE [DISTWIDTH] IN BLOOD BY AUTOMATED COUNT: 13.9 % (ref 11.5–14.5)
GFR SERPL CREATININE-BSD FRML MDRD: > 90 ML/MIN/1.73M2
GLUCOSE BLD STRIP.AUTO-MCNC: 104 MG/DL (ref 70–108)
GLUCOSE BLD STRIP.AUTO-MCNC: 113 MG/DL (ref 70–108)
GLUCOSE BLD STRIP.AUTO-MCNC: 132 MG/DL (ref 70–108)
GLUCOSE BLD STRIP.AUTO-MCNC: 94 MG/DL (ref 70–108)
GLUCOSE SERPL-MCNC: 120 MG/DL (ref 70–108)
HCT VFR BLD AUTO: 32.6 % (ref 37–47)
HGB BLD-MCNC: 10.1 GM/DL (ref 12–16)
IMM GRANULOCYTES # BLD AUTO: 0.07 THOU/MM3 (ref 0–0.07)
IMM GRANULOCYTES NFR BLD AUTO: 0.6 %
LYMPHOCYTES ABSOLUTE: 1.5 THOU/MM3 (ref 1–4.8)
LYMPHOCYTES NFR BLD AUTO: 13.1 %
MAGNESIUM SERPL-MCNC: 2.2 MG/DL (ref 1.6–2.4)
MCH RBC QN AUTO: 23.7 PG (ref 26–33)
MCHC RBC AUTO-ENTMCNC: 31 GM/DL (ref 32.2–35.5)
MCV RBC AUTO: 76.3 FL (ref 81–99)
MONOCYTES ABSOLUTE: 1.5 THOU/MM3 (ref 0.4–1.3)
MONOCYTES NFR BLD AUTO: 12.8 %
NEUTROPHILS ABSOLUTE: 8.4 THOU/MM3 (ref 1.8–7.7)
NEUTROPHILS NFR BLD AUTO: 73.3 %
NRBC BLD AUTO-RTO: 0 /100 WBC
PHOSPHATE SERPL-MCNC: 3.7 MG/DL (ref 2.4–4.7)
PLATELET # BLD AUTO: 233 THOU/MM3 (ref 130–400)
PMV BLD AUTO: 13.2 FL (ref 9.4–12.4)
POTASSIUM SERPL-SCNC: 3.1 MEQ/L (ref 3.5–5.2)
POTASSIUM SERPL-SCNC: 3.5 MEQ/L (ref 3.5–5.2)
PROT SERPL-MCNC: 5.5 G/DL (ref 6.1–8)
RBC # BLD AUTO: 4.27 MILL/MM3 (ref 4.2–5.4)
SODIUM SERPL-SCNC: 137 MEQ/L (ref 135–145)
WBC # BLD AUTO: 11.4 THOU/MM3 (ref 4.8–10.8)

## 2024-09-28 PROCEDURE — 83735 ASSAY OF MAGNESIUM: CPT

## 2024-09-28 PROCEDURE — APPSS30 APP SPLIT SHARED TIME 16-30 MINUTES: Performed by: NURSE PRACTITIONER

## 2024-09-28 PROCEDURE — 6370000000 HC RX 637 (ALT 250 FOR IP): Performed by: STUDENT IN AN ORGANIZED HEALTH CARE EDUCATION/TRAINING PROGRAM

## 2024-09-28 PROCEDURE — 2580000003 HC RX 258: Performed by: INTERNAL MEDICINE

## 2024-09-28 PROCEDURE — 6360000002 HC RX W HCPCS: Performed by: NURSE PRACTITIONER

## 2024-09-28 PROCEDURE — 2580000003 HC RX 258: Performed by: STUDENT IN AN ORGANIZED HEALTH CARE EDUCATION/TRAINING PROGRAM

## 2024-09-28 PROCEDURE — 82248 BILIRUBIN DIRECT: CPT

## 2024-09-28 PROCEDURE — 80053 COMPREHEN METABOLIC PANEL: CPT

## 2024-09-28 PROCEDURE — 6360000002 HC RX W HCPCS: Performed by: STUDENT IN AN ORGANIZED HEALTH CARE EDUCATION/TRAINING PROGRAM

## 2024-09-28 PROCEDURE — 2500000003 HC RX 250 WO HCPCS: Performed by: NURSE PRACTITIONER

## 2024-09-28 PROCEDURE — 99232 SBSQ HOSP IP/OBS MODERATE 35: CPT | Performed by: SURGERY

## 2024-09-28 PROCEDURE — 1200000003 HC TELEMETRY R&B

## 2024-09-28 PROCEDURE — 84132 ASSAY OF SERUM POTASSIUM: CPT

## 2024-09-28 PROCEDURE — 99232 SBSQ HOSP IP/OBS MODERATE 35: CPT | Performed by: NURSE PRACTITIONER

## 2024-09-28 PROCEDURE — 84100 ASSAY OF PHOSPHORUS: CPT

## 2024-09-28 PROCEDURE — 36415 COLL VENOUS BLD VENIPUNCTURE: CPT

## 2024-09-28 PROCEDURE — 85025 COMPLETE CBC W/AUTO DIFF WBC: CPT

## 2024-09-28 PROCEDURE — 82948 REAGENT STRIP/BLOOD GLUCOSE: CPT

## 2024-09-28 PROCEDURE — 6360000002 HC RX W HCPCS: Performed by: INTERNAL MEDICINE

## 2024-09-28 PROCEDURE — 82330 ASSAY OF CALCIUM: CPT

## 2024-09-28 RX ORDER — LEUCINE, PHENYLALANINE, LYSINE, METHIONINE, ISOLEUCINE, VALINE, HISTIDINE, THREONINE, TRYPTOPHAN, ALANINE, GLYCINE, ARGININE, PROLINE, SERINE, TYROSINE, DEXTROSE 311; 238; 247; 170; 255; 247; 204; 179; 77; 880; 438; 489; 289; 213; 17; 5 MG/100ML; MG/100ML; MG/100ML; MG/100ML; MG/100ML; MG/100ML; MG/100ML; MG/100ML; MG/100ML; MG/100ML; MG/100ML; MG/100ML; MG/100ML; MG/100ML; MG/100ML; G/100ML
85 INJECTION INTRAVENOUS
Status: DISPENSED | OUTPATIENT
Start: 2024-09-28 | End: 2024-09-29

## 2024-09-28 RX ORDER — POTASSIUM CHLORIDE 7.45 MG/ML
10 INJECTION INTRAVENOUS
Status: ACTIVE | OUTPATIENT
Start: 2024-09-28 | End: 2024-09-28

## 2024-09-28 RX ORDER — POTASSIUM CHLORIDE 7.45 MG/ML
10 INJECTION INTRAVENOUS
Status: DISCONTINUED | OUTPATIENT
Start: 2024-09-28 | End: 2024-09-28

## 2024-09-28 RX ADMIN — ONDANSETRON 4 MG: 2 INJECTION INTRAMUSCULAR; INTRAVENOUS at 06:19

## 2024-09-28 RX ADMIN — METHIMAZOLE 10 MG: 10 TABLET ORAL at 08:43

## 2024-09-28 RX ADMIN — METOCLOPRAMIDE 10 MG: 5 INJECTION, SOLUTION INTRAMUSCULAR; INTRAVENOUS at 23:33

## 2024-09-28 RX ADMIN — HYDRALAZINE HYDROCHLORIDE 10 MG: 20 INJECTION INTRAMUSCULAR; INTRAVENOUS at 20:04

## 2024-09-28 RX ADMIN — ONDANSETRON 4 MG: 2 INJECTION INTRAMUSCULAR; INTRAVENOUS at 20:04

## 2024-09-28 RX ADMIN — ENOXAPARIN SODIUM 30 MG: 100 INJECTION SUBCUTANEOUS at 20:04

## 2024-09-28 RX ADMIN — METHIMAZOLE 10 MG: 10 TABLET ORAL at 20:33

## 2024-09-28 RX ADMIN — POTASSIUM CHLORIDE 10 MEQ: 7.46 INJECTION, SOLUTION INTRAVENOUS at 10:37

## 2024-09-28 RX ADMIN — SODIUM CHLORIDE, PRESERVATIVE FREE 10 ML: 5 INJECTION INTRAVENOUS at 08:44

## 2024-09-28 RX ADMIN — METOCLOPRAMIDE 10 MG: 5 INJECTION, SOLUTION INTRAMUSCULAR; INTRAVENOUS at 13:15

## 2024-09-28 RX ADMIN — POTASSIUM CHLORIDE 10 MEQ: 7.46 INJECTION, SOLUTION INTRAVENOUS at 04:37

## 2024-09-28 RX ADMIN — ENOXAPARIN SODIUM 30 MG: 100 INJECTION SUBCUTANEOUS at 08:44

## 2024-09-28 RX ADMIN — HYDRALAZINE HYDROCHLORIDE 10 MG: 20 INJECTION INTRAMUSCULAR; INTRAVENOUS at 08:44

## 2024-09-28 RX ADMIN — METHIMAZOLE 10 MG: 10 TABLET ORAL at 14:50

## 2024-09-28 RX ADMIN — POTASSIUM CHLORIDE 10 MEQ: 7.46 INJECTION, SOLUTION INTRAVENOUS at 10:10

## 2024-09-28 RX ADMIN — HYDROXYCHLOROQUINE SULFATE 200 MG: 200 TABLET ORAL at 08:43

## 2024-09-28 RX ADMIN — SODIUM CHLORIDE, PRESERVATIVE FREE 10 ML: 5 INJECTION INTRAVENOUS at 20:06

## 2024-09-28 RX ADMIN — POTASSIUM CHLORIDE 10 MEQ: 7.46 INJECTION, SOLUTION INTRAVENOUS at 12:48

## 2024-09-28 RX ADMIN — PANTOPRAZOLE SODIUM 40 MG: 40 INJECTION, POWDER, FOR SOLUTION INTRAVENOUS at 14:50

## 2024-09-28 RX ADMIN — PROCHLORPERAZINE EDISYLATE 10 MG: 5 INJECTION INTRAMUSCULAR; INTRAVENOUS at 23:36

## 2024-09-28 RX ADMIN — LEUCINE, PHENYLALANINE, LYSINE, METHIONINE, ISOLEUCINE, VALINE, HISTIDINE, THREONINE, TRYPTOPHAN, ALANINE, GLYCINE, ARGININE, PROLINE, SERINE, TYROSINE, DEXTROSE 85 ML/HR: 311; 238; 247; 170; 255; 247; 204; 179; 77; 880; 438; 489; 289; 213; 17; 5 INJECTION INTRAVENOUS at 18:27

## 2024-09-28 RX ADMIN — PIPERACILLIN AND TAZOBACTAM 4500 MG: 4; .5 INJECTION, POWDER, FOR SOLUTION INTRAVENOUS at 03:35

## 2024-09-28 RX ADMIN — METOCLOPRAMIDE 10 MG: 5 INJECTION, SOLUTION INTRAMUSCULAR; INTRAVENOUS at 18:23

## 2024-09-28 RX ADMIN — METOCLOPRAMIDE 10 MG: 5 INJECTION, SOLUTION INTRAMUSCULAR; INTRAVENOUS at 05:41

## 2024-09-28 RX ADMIN — LISINOPRIL 40 MG: 40 TABLET ORAL at 08:43

## 2024-09-28 RX ADMIN — PIPERACILLIN AND TAZOBACTAM 4500 MG: 4; .5 INJECTION, POWDER, FOR SOLUTION INTRAVENOUS at 13:12

## 2024-09-28 RX ADMIN — PIPERACILLIN AND TAZOBACTAM 4500 MG: 4; .5 INJECTION, POWDER, FOR SOLUTION INTRAVENOUS at 20:12

## 2024-09-28 RX ADMIN — POTASSIUM CHLORIDE 10 MEQ: 7.46 INJECTION, SOLUTION INTRAVENOUS at 07:32

## 2024-09-28 ASSESSMENT — PAIN SCALES - GENERAL: PAINLEVEL_OUTOF10: 0

## 2024-09-28 NOTE — PROGRESS NOTES
Pharmacy Consult for TPN/PPN Monitoring & Adjustment  IV Access: peripheral      Dosing weight: 70.2 kg  Current insulin regimen: low sliding scale  Diet (excluding TPN): NPO  Maintenance fluid: none    Plan:  Continue Clinimix 4.25/5 at 85 ml/hr    Summary of changes for tonight's TPN: none    Electrolyte Replacement: potassium chloride 40 mEq IV (entered)    Monitoring:  BMP, Mag, iCal, & Phos ordered for tomorrow with AM labs.    Pharmacy will continue to follow daily. Thank you for the consult.  Howard Lacy [facility] 9/28/2024 12:13 PM

## 2024-09-28 NOTE — PROGRESS NOTES
Comprehensive Nutrition Assessment    Type and Reason for Visit:  Initial, Positive Nutrition Screen (Weight Loss and Decreased Appetite)    Nutrition Recommendations/Plan:   Recommend to initiate PPN - pt refusing PPN and wants to wait until PICC placed; educated and encouraged pt to consider starting peripheral line for nutrition support  If PPN initiated - recommend Clinimix 4.25/5 at 85 mL/hr to provide ~10 kcals/kg, ~1.2 gram/kg amino acids per 24 hour infusion  Dosing weight: 70 kg  Monitor POC glucose levels and give insulin as needed with PN initiation  Continue NPO per provider and advancements as GI function allows/tolerated  SLP eval for swallowing difficulty noted in EMR     Malnutrition Assessment:  Malnutrition Status:  Moderate malnutrition (09/28/24 1303)    Context:  Chronic Illness     Findings of the 6 clinical characteristics of malnutrition:  Energy Intake:  75% or less estimated energy requirements for 1 month or longer (Poor PO for the last ~1.5-2 months; on going N/V)  Weight Loss:   (reports losing ~100# in the last year unintentionally; 40-50# recently per pt recall; states she was recently in 300s and now 282# per rough estimate bedscale wt today; note edema and hx/o CHF as well)     Body Fat Loss:  No significant body fat loss     Muscle Mass Loss:  No significant muscle mass loss    Fluid Accumulation:  Mild Generalized   Strength:  Not Performed    Nutrition Assessment:     Pt. moderately malnourished AEB criteria as listed above.  At risk for further nutrition compromise r/t admit with N/V ongoing for last month or two, hypokalemia 2/2 GI losses, UTI, pericolonic fluid collection - possible abscess - diverticulitis, suspected gastroparesis, hyperthyroidism, leukocytosis suspected d/t N/V, and underlying medical condition (PMHx: anemia, anxiety, CHF, COPD, T2DM, HTN, lupus, obesity, RIRI, OA, former smoker).        Nutrition Related Findings:    Pt. Report/Treatments/Miscellaneous:  Pt seen - closing eyes throughout conversation, c/o N/V and inability for PO intake for the last 1.5-2 months. She states that she has gone down 5 pant sizes and feels that most recently her N/V resulted in 40-50# weight loss. Pt aware of plan for PN initiation d/t GI function at this time; pt declining PPN start until PICC placed - educated pt on the difference and pt still not in agreement until PICC placed. Encouraged pt to think about it.   GI Status: BM - 9/27; constipation and diarrhea per pt; N/V for 1.5-2 months per pt  Pertinent Labs: K 3.1, Mg 2.2, Phos 3.7, glucose 120  Pertinent Meds: tapazole, reglan, zosyn, Kcl IV, zofran     Wound Type: None       Current Nutrition Intake & Therapies:    Average Meal Intake: NPO     Diet NPO Exceptions are: Sips of Clear Liquids  Current Parenteral Nutrition Orders:  Type and Formula: Premix Peripheral (? starting today? - Clinimix 4.25/5 at 85 mL/hr; ~10 kcals/kg, ~1.2 grams/kg amino acids)   Lipids: None  Duration: Continuous  Rate/Volume: 85 mL/hr for 24 hours  Current PN Order Provides: pt refusing PPN - agreeable to TPN only when PICC placed?  Goal PN Orders Provides: Recommend to start Clinimix 4.25/5 at 85 mL/hr to provide: 694 kcals, 87 grams protein, 102 grams dextrose per 24 hours    Anthropometric Measures:  Height: 157.5 cm (5' 2\")  Ideal Body Weight (IBW): 110 lbs (50 kg)    Admission Body Weight: 130.6 kg (287 lb 14.7 oz) (9/26: stated weight)  Current Body Weight: 127.9 kg (282 lb) (9/28: obtained during visit; bedscale but did not disturb pt by removing pillows/blankets etc; +1 generalized edema)  Current BMI (kg/m2): 51.6  Usual Body Weight:  (per pt was 300# \"recently\"; per EMR: 1/29/19: 390# 10 oz, 6/3/23: 348# 2 oz - [facility])     Weight Adjustment For: No Adjustment                 BMI Categories: Obese Class 3 (BMI 40.0 or greater)    Estimated Daily Nutrient Needs:  Energy Requirements Based On: Kcal/kg  Weight Used for Energy Requirements: Current

## 2024-09-28 NOTE — PROGRESS NOTES
B/P- 154/82  O2 saturation- 96% on room air  HR- 98  Temp- 98.8  Respirations- 20  Pain- patient states abdominal pain 4 out of 10 primary RN notified  Head to toe assessment completed. No changes from initial assessment. Patient had productive cough with clear mucus. Head of the bed was raised. Patient requested Zofran. Nurse was notified. Continuing hourly checks.

## 2024-09-28 NOTE — PROGRESS NOTES
Patient refused to receive clinimix TPN or any TPN until she has a central line placed. Provider notified and patient educated.

## 2024-09-28 NOTE — PROGRESS NOTES
Gastroenterology  Progress Note    9/28/2024 1:55 PM  Subjective:   Admit Date: 9/26/2024    Interval History: Patient presented with diverticulitis nausea and vomits has doctors plan to conservative therapy plan to start PPI twice daily and upper endoscopy diagnostic on Monday.  Diet: Diet NPO Exceptions are: Sips of Clear Liquids    Medications:   Scheduled Meds:    piperacillin-tazobactam  4,500 mg IntraVENous q8h    metoclopramide  10 mg IntraVENous Q6H    sodium chloride flush  5-40 mL IntraVENous 2 times per day    diclofenac sodium  2 g Topical BID    hydroxychloroquine  200 mg Oral Daily    lisinopril  40 mg Oral Daily    montelukast  10 mg Oral Nightly    methIMAzole  10 mg Oral TID    enoxaparin  30 mg SubCUTAneous BID    insulin lispro  0-4 Units SubCUTAneous Q6H     Continuous Infusions:    CLINIMIX 4.25/5      CLINIMIX 4.25/5      sodium chloride      dextrose         CBC:   Recent Labs     09/26/24  0950 09/27/24  0458 09/28/24  0333   WBC 12.2* 10.1 11.4*   HGB 11.6* 10.0* 10.1*    213 233     BMP:    Recent Labs     09/26/24  1033 09/27/24  0458 09/28/24  0333    139 137   K 4.2 3.5 3.1*    103 100   CO2 29 27 24   BUN 14 13 14   CREATININE 0.4 0.4 0.4   GLUCOSE 139* 114* 120*     Hepatic:   Recent Labs     09/26/24  1033 09/27/24  0458 09/28/24  0333   AST 41* 37 40   ALT 33 28 31   BILITOT 1.3* 1.1 1.1   ALKPHOS 132* 110 125     INR: No results for input(s): \"INR\" in the last 72 hours.    Imaging:  No results found for this or any previous visit.    Results for orders placed during the hospital encounter of 09/26/24    CT ABDOMEN PELVIS W IV CONTRAST Additional Contrast? None    Narrative  PROCEDURE: CT ABDOMEN PELVIS W IV CONTRAST    CLINICAL INFORMATION: Abdominal pain.    COMPARISON: None    TECHNIQUE: Axial 5 mm CT images were obtained through the abdomen and pelvis  after the administration of 80 cc Isovue 370 intravenous contrast. Coronal and  sagittal reconstructions

## 2024-09-28 NOTE — PROGRESS NOTES
Hospitalist Progress Note    Patient:  Baylee Irizarry      Unit/Bed:5K-11/011-A    YOB: 1955    MRN: [id number]       Acct: [id number]     PCP: Myriam Sun APRN - CNP    Date of Admission: 9/26/2024    Assessment/Plan:    Pericolonic fluid collection, possible abscess, diverticulitis: Irregular fluid collection, <3 cm seen on CT, elevated WBC 12.2, hemoglobin, endorses nausea and vomiting and abdominal pain. Continue IV Zosyn. IV Zofran / Compazine PRN. NPO. General surgery following. No surgical intervention planned. Plans to repeat imaging in 2-3 days pending clinical progress.   Intractable n/v x 4 weeks. UGI today. GI consulted. Plans for PICC and TPN. Reglan given.  Hypokalemia due to GI loss. Replace per protocol. Mag noted to be 2.2.  Accelerated Hypertension, improved. Hx HTN: s/p IV Hydralazine 5 mg once on admission. Continue IV Hydralazing 10 mg every 6 hrs PRN SBP >170. Home ACE continued. Held HCTZ.  UTI(poa): Urine showed many bacteria and was orange. Continue above Zosyn. Await final culture and sensitivity.   Hyperthyroidism, uncontrolled. Unable to keep orals down. TSH 0.005, free T4 3.42, free T3 5.4 Continue Tapazole 10 mg tid. Endocrinology consulted by general surgery.  Microcytic anemia. Hbg 10.1.   Leukocytosis: upward trend. Likely due to n/v.  T2DM: BS trend in goal. Accu-Cheks qid. Hypoglycemia protocol Low-dose SSI.  Asthma: Continue albuterol nebulizer q6h prn  RIRI: Continue home CPAP  Lupus: Continue home Plaquenil 200 qd.  Morbid obesity. BMI 52.68.      Chief Complaint: Fatigue and emesis     Hospital Course:     Baylee Irizarry is a 69 y.o. female with PMHx of Anemia, Anxiety, Asthma, CHF, COPD, MDD, DM2, HA, Lupus, HTN, Obesity, RIRI who presents to [facility] ED with CC:1 month emesis and difficulty eating. On 9/23 given potassium at [facility], PCP advised her to come in. C/o abdominal soreness. Pain 7/10,   [facility] said thyroid problem. Denied fever no  prochlorperazine, albuterol, glucose, dextrose bolus **OR** dextrose bolus, glucagon (rDNA), dextrose      Intake/Output Summary (Last 24 hours) at 9/28/2024 1207  Last data filed at 9/28/2024 0559  Gross per 24 hour   Intake 1696.07 ml   Output 0 ml   Net 1696.07 ml       Diet:  Diet NPO Exceptions are: Sips of Clear Liquids    Exam:  BP (!) 175/75   Pulse (!) 101   Temp 98.5 °F (36.9 °C) (Axillary)   Resp 20   Ht 1.575 m (5' 2\")   Wt 130.6 kg (288 lb)   SpO2 97%   BMI 52.68 kg/m²     General appearance: No apparent distress, appears stated age and cooperative.  HEENT: Pupils equal, round, and reactive to light. Conjunctivae/corneas clear.  Neck: Supple, with full range of motion. No jugular venous distention. Trachea midline.  Respiratory:  Normal respiratory effort. Clear to auscultation, bilaterally without Rales/Wheezes/Rhonchi.  Cardiovascular: Regular rate and rhythm with normal S1/S2 without murmurs, rubs or gallops.  Abdomen: Soft, obese, non-tender, non-distended with normal bowel sounds.  Musculoskeletal: passive and active ROM x 4 extremities.  Skin: Skin color, texture, turgor normal.  No rashes or lesions.  Neurologic:  Neurovascularly intact without any focal sensory/motor deficits. Cranial nerves: II-XII intact, grossly non-focal.  Psychiatric: Alert and oriented, thought content appropriate, normal insight  Capillary Refill: Brisk,< 3 seconds   Peripheral Pulses: +2 palpable, equal bilaterally       Labs:   Recent Labs     09/26/24  0950 09/27/24  0458 09/28/24  0333   WBC 12.2* 10.1 11.4*   HGB 11.6* 10.0* 10.1*   HCT 37.2 33.1* 32.6*    213 233     Recent Labs     09/26/24  1033 09/27/24  0458 09/27/24  1730 09/28/24  0333    139  --  137   K 4.2 3.5  --  3.1*    103  --  100   CO2 29 27 --  24   BUN 14 13  --  14   CREATININE 0.4 0.4  --  0.4   CALCIUM 9.0 8.2*  --  8.3*   PHOS  --   --  3.3 3.7     Recent Labs     09/26/24  1033 09/27/24  0458 09/28/24  0333   AST 41*

## 2024-09-28 NOTE — PROGRESS NOTES
Jyoti Matamoros, APRN - CNP on behalf of   Dr Flores covering for Dr Jackie LEE DR GENERAL SURGERY  General Surgery Postoperative Progress Note    Pt Name: Baylee Irizarry  Medical Record Number: [id number]  Date of Birth 1955   Today's Date: 9/28/2024    CC:  Chief Complaint   Patient presents with    Fatigue    Emesis       ASSESSMENT   HD # 2  Continues to have nausea and vomiting which has been present for a month which would be extremely unlikely to be due to the current episode of diverticulitis that she has.  She is diabetic and maybe she has diabetic gastroparesis or some other etiology so consideration for GI consult for evaluation for nausea vomiting has been present for 1 month  She has had a prior cholecystectomy even though is not listed as a prior surgery  White count slight trend up  Has been afebrile   Hypertensive  Hypokalemia   PLAN   Continue current IV antibiotics  Cannot believe that her nausea and vomiting for 1 month prior is related to her current episode of diverticulitis consider GI consult for evaluation  Keep diet NPO as is until better control of nausea vomiting or etiology identified.   PICC/TPN  Hospitalist for medical management  Will repeat the CT scan in 3 or 4 days from her last 1 depending on her clinical course to evaluate the progression or resolution of these poorly defined fluid collections  GI consulted and seen- UGI is ordered  SUBJECTIVE   Baylee is doing ok.  She did have emesis episode this am on rounds.  Discussed plans for UGI  states shell just puke up the contrast. Abdomen has slight tenderness. Had bowel function yesterday. Updated Dr Flores and discussed plan.  CURRENT MEDICATIONS   Scheduled Meds:   piperacillin-tazobactam  4,500 mg IntraVENous q8h    metoclopramide  10 mg IntraVENous Q6H    sodium chloride flush  5-40 mL IntraVENous 2 times per day    diclofenac sodium  2 g Topical BID    hydroxychloroquine  200 mg Oral Daily    lisinopril  40 mg

## 2024-09-28 NOTE — PLAN OF CARE
Problem: Chronic Conditions and Co-morbidities  Goal: Patient's chronic conditions and co-morbidity symptoms are monitored and maintained or improved  9/28/2024 0019 by Luersman, Kristen, RN  Outcome: Progressing  Flowsheets (Taken 9/28/2024 0019)  Care Plan - Patient's Chronic Conditions and Co-Morbidity Symptoms are Monitored and Maintained or Improved: Monitor and assess patient's chronic conditions and comorbid symptoms for stability, deterioration, or improvement     Problem: Pain  Goal: Verbalizes/displays adequate comfort level or baseline comfort level  9/28/2024 0019 by Luersman, Kristen, RN  Outcome: Progressing  Flowsheets (Taken 9/28/2024 0019)  Verbalizes/displays adequate comfort level or baseline comfort level:   Encourage patient to monitor pain and request assistance   Assess pain using appropriate pain scale     Problem: Safety - Adult  Goal: Free from fall injury  9/28/2024 0019 by Luersman, Kristen, RN  Outcome: Progressing  Flowsheets (Taken 9/28/2024 0019)  Free From Fall Injury: Instruct family/caregiver on patient safety     Problem: Skin/Tissue Integrity  Goal: Absence of new skin breakdown  Description: 1.  Monitor for areas of redness and/or skin breakdown  2.  Assess vascular access sites hourly  3.  Every 4-6 hours minimum:  Change oxygen saturation probe site  4.  Every 4-6 hours:  If on nasal continuous positive airway pressure, respiratory therapy assess nares and determine need for appliance change or resting period.  9/28/2024 0019 by Luersman, Kristen, RN  Outcome: Progressing     Problem: Gastrointestinal - Adult  Goal: Minimal or absence of nausea and vomiting  9/28/2024 0019 by Luersman, Kristen, RN  Outcome: Progressing  Flowsheets (Taken 9/28/2024 0019)  Minimal or absence of nausea and vomiting:   Administer IV fluids as ordered to ensure adequate hydration   Maintain NPO status until nausea and vomiting are resolved  Note: Patient given zofran and compazine for nausea

## 2024-09-28 NOTE — PLAN OF CARE
Problem: Chronic Conditions and Co-morbidities  Goal: Patient's chronic conditions and co-morbidity symptoms are monitored and maintained or improved  9/28/2024 1101 by Haroon Lawton, RN  Outcome: Progressing  Flowsheets (Taken 9/28/2024 1101)  Care Plan - Patient's Chronic Conditions and Co-Morbidity Symptoms are Monitored and Maintained or Improved:   Monitor and assess patient's chronic conditions and comorbid symptoms for stability, deterioration, or improvement   Collaborate with multidisciplinary team to address chronic and comorbid conditions and prevent exacerbation or deterioration   Update acute care plan with appropriate goals if chronic or comorbid symptoms are exacerbated and prevent overall improvement and discharge     Problem: Pain  Goal: Verbalizes/displays adequate comfort level or baseline comfort level  9/28/2024 1101 by Haroon Lawton, RN  Outcome: Progressing  Flowsheets (Taken 9/28/2024 1101)  Verbalizes/displays adequate comfort level or baseline comfort level:   Encourage patient to monitor pain and request assistance   Administer analgesics based on type and severity of pain and evaluate response   Consider cultural and social influences on pain and pain management   Assess pain using appropriate pain scale   Implement non-pharmacological measures as appropriate and evaluate response   Notify Licensed Independent Practitioner if interventions unsuccessful or patient reports new pain     Problem: Safety - Adult  Goal: Free from fall injury  9/28/2024 1101 by Haroon Lawton, RN  Outcome: Progressing  Flowsheets (Taken 9/28/2024 1101)  Free From Fall Injury: Instruct family/caregiver on patient safety     Problem: Skin/Tissue Integrity  Goal: Absence of new skin breakdown  Description: 1.  Monitor for areas of redness and/or skin breakdown  2.  Assess vascular access sites hourly  3.  Every 4-6 hours minimum:  Change oxygen saturation probe site  4.  Every 4-6 hours:  If on nasal continuous  positive airway pressure, respiratory therapy assess nares and determine need for appliance change or resting period.  9/28/2024 1101 by Haroon Lawton RN  Outcome: Progressing     Problem: Gastrointestinal - Adult  Goal: Minimal or absence of nausea and vomiting  9/28/2024 1101 by Haroon Lawton, RN  Outcome: Progressing  Flowsheets (Taken 9/28/2024 1101)  Minimal or absence of nausea and vomiting:   Administer IV fluids as ordered to ensure adequate hydration   Maintain NPO status until nausea and vomiting are resolved   Provide nonpharmacologic comfort measures as appropriate     Problem: Gastrointestinal - Adult  Goal: Maintains or returns to baseline bowel function  9/28/2024 1101 by Haroon Lawton RN  Outcome: Progressing  Flowsheets (Taken 9/28/2024 1101)  Maintains or returns to baseline bowel function:   Assess bowel function   Administer IV fluids as ordered to ensure adequate hydration   Administer ordered medications as needed     Problem: Discharge Planning  Goal: Discharge to home or other facility with appropriate resources  9/28/2024 1101 by Haroon Lawton RN  Outcome: Progressing  Flowsheets (Taken 9/28/2024 1101)  Discharge to home or other facility with appropriate resources:   Identify barriers to discharge with patient and caregiver   Identify discharge learning needs (meds, wound care, etc)   Refer to discharge planning if patient needs post-hospital services based on physician order or complex needs related to functional status, cognitive ability or social support system   Arrange for needed discharge resources and transportation as appropriate   Care plan reviewed with patient.  Patient verbalize understanding of the plan of care and contribute to goal setting.

## 2024-09-29 ENCOUNTER — APPOINTMENT (OUTPATIENT)
Dept: GENERAL RADIOLOGY | Age: 69
DRG: 357 | End: 2024-09-29
Payer: MEDICARE

## 2024-09-29 LAB
ALBUMIN SERPL BCG-MCNC: 2.3 G/DL (ref 3.5–5.1)
ALP SERPL-CCNC: 124 U/L (ref 38–126)
ALT SERPL W/O P-5'-P-CCNC: 28 U/L (ref 11–66)
ANION GAP SERPL CALC-SCNC: 13 MEQ/L (ref 8–16)
AST SERPL-CCNC: 30 U/L (ref 5–40)
BASOPHILS ABSOLUTE: 0 THOU/MM3 (ref 0–0.1)
BASOPHILS NFR BLD AUTO: 0.4 %
BILIRUB CONJ SERPL-MCNC: 0.6 MG/DL (ref 0.1–13.8)
BILIRUB SERPL-MCNC: 1 MG/DL (ref 0.3–1.2)
BUN SERPL-MCNC: 20 MG/DL (ref 7–22)
CALCIUM SERPL-MCNC: 8.4 MG/DL (ref 8.5–10.5)
CHLORIDE SERPL-SCNC: 100 MEQ/L (ref 98–111)
CO2 SERPL-SCNC: 22 MEQ/L (ref 23–33)
CREAT SERPL-MCNC: 0.3 MG/DL (ref 0.4–1.2)
DEPRECATED RDW RBC AUTO: 41.7 FL (ref 35–45)
EOSINOPHIL NFR BLD AUTO: 0 %
EOSINOPHILS ABSOLUTE: 0 THOU/MM3 (ref 0–0.4)
ERYTHROCYTE [DISTWIDTH] IN BLOOD BY AUTOMATED COUNT: 14.1 % (ref 11.5–14.5)
GFR SERPL CREATININE-BSD FRML MDRD: > 90 ML/MIN/1.73M2
GLUCOSE BLD STRIP.AUTO-MCNC: 126 MG/DL (ref 70–108)
GLUCOSE BLD STRIP.AUTO-MCNC: 139 MG/DL (ref 70–108)
GLUCOSE BLD STRIP.AUTO-MCNC: 155 MG/DL (ref 70–108)
GLUCOSE BLD STRIP.AUTO-MCNC: 186 MG/DL (ref 70–108)
GLUCOSE SERPL-MCNC: 147 MG/DL (ref 70–108)
HCT VFR BLD AUTO: 35.6 % (ref 37–47)
HGB BLD-MCNC: 10.3 GM/DL (ref 12–16)
HYPOCHROMIA BLD QL SMEAR: PRESENT
IMM GRANULOCYTES # BLD AUTO: 0.08 THOU/MM3 (ref 0–0.07)
IMM GRANULOCYTES NFR BLD AUTO: 0.7 %
LYMPHOCYTES ABSOLUTE: 1.2 THOU/MM3 (ref 1–4.8)
LYMPHOCYTES NFR BLD AUTO: 10.4 %
MCH RBC QN AUTO: 23.8 PG (ref 26–33)
MCHC RBC AUTO-ENTMCNC: 28.9 GM/DL (ref 32.2–35.5)
MCV RBC AUTO: 82.2 FL (ref 81–99)
MONOCYTES ABSOLUTE: 1.3 THOU/MM3 (ref 0.4–1.3)
MONOCYTES NFR BLD AUTO: 11.7 %
NEUTROPHILS ABSOLUTE: 8.8 THOU/MM3 (ref 1.8–7.7)
NEUTROPHILS NFR BLD AUTO: 76.8 %
NRBC BLD AUTO-RTO: 0 /100 WBC
PLATELET # BLD AUTO: 248 THOU/MM3 (ref 130–400)
PLATELET BLD QL SMEAR: ADEQUATE
PMV BLD AUTO: 13.5 FL (ref 9.4–12.4)
POTASSIUM SERPL-SCNC: 3.3 MEQ/L (ref 3.5–5.2)
POTASSIUM SERPL-SCNC: 3.7 MEQ/L (ref 3.5–5.2)
PROT SERPL-MCNC: 5.6 G/DL (ref 6.1–8)
RBC # BLD AUTO: 4.33 MILL/MM3 (ref 4.2–5.4)
SCAN OF BLOOD SMEAR: NORMAL
SODIUM SERPL-SCNC: 135 MEQ/L (ref 135–145)
WBC # BLD AUTO: 11.4 THOU/MM3 (ref 4.8–10.8)

## 2024-09-29 PROCEDURE — 6360000002 HC RX W HCPCS: Performed by: STUDENT IN AN ORGANIZED HEALTH CARE EDUCATION/TRAINING PROGRAM

## 2024-09-29 PROCEDURE — 99232 SBSQ HOSP IP/OBS MODERATE 35: CPT | Performed by: NURSE PRACTITIONER

## 2024-09-29 PROCEDURE — 6360000002 HC RX W HCPCS: Performed by: NURSE PRACTITIONER

## 2024-09-29 PROCEDURE — 2580000003 HC RX 258: Performed by: STUDENT IN AN ORGANIZED HEALTH CARE EDUCATION/TRAINING PROGRAM

## 2024-09-29 PROCEDURE — 36415 COLL VENOUS BLD VENIPUNCTURE: CPT

## 2024-09-29 PROCEDURE — 2500000003 HC RX 250 WO HCPCS: Performed by: NURSE PRACTITIONER

## 2024-09-29 PROCEDURE — 74240 X-RAY XM UPR GI TRC 1CNTRST: CPT

## 2024-09-29 PROCEDURE — 84132 ASSAY OF SERUM POTASSIUM: CPT

## 2024-09-29 PROCEDURE — 82948 REAGENT STRIP/BLOOD GLUCOSE: CPT

## 2024-09-29 PROCEDURE — 6360000002 HC RX W HCPCS: Performed by: INTERNAL MEDICINE

## 2024-09-29 PROCEDURE — 2580000003 HC RX 258: Performed by: INTERNAL MEDICINE

## 2024-09-29 PROCEDURE — 6360000002 HC RX W HCPCS

## 2024-09-29 PROCEDURE — 85025 COMPLETE CBC W/AUTO DIFF WBC: CPT

## 2024-09-29 PROCEDURE — 82248 BILIRUBIN DIRECT: CPT

## 2024-09-29 PROCEDURE — 80053 COMPREHEN METABOLIC PANEL: CPT

## 2024-09-29 PROCEDURE — 1200000003 HC TELEMETRY R&B

## 2024-09-29 PROCEDURE — 6370000000 HC RX 637 (ALT 250 FOR IP): Performed by: STUDENT IN AN ORGANIZED HEALTH CARE EDUCATION/TRAINING PROGRAM

## 2024-09-29 RX ORDER — LEUCINE, PHENYLALANINE, LYSINE, METHIONINE, ISOLEUCINE, VALINE, HISTIDINE, THREONINE, TRYPTOPHAN, ALANINE, GLYCINE, ARGININE, PROLINE, SERINE, TYROSINE, DEXTROSE 311; 238; 247; 170; 255; 247; 204; 179; 77; 880; 438; 489; 289; 213; 17; 5 MG/100ML; MG/100ML; MG/100ML; MG/100ML; MG/100ML; MG/100ML; MG/100ML; MG/100ML; MG/100ML; MG/100ML; MG/100ML; MG/100ML; MG/100ML; MG/100ML; MG/100ML; G/100ML
85 INJECTION INTRAVENOUS
Status: DISPENSED | OUTPATIENT
Start: 2024-09-29 | End: 2024-09-30

## 2024-09-29 RX ORDER — POTASSIUM CHLORIDE 7.45 MG/ML
10 INJECTION INTRAVENOUS
Status: DISPENSED | OUTPATIENT
Start: 2024-09-29 | End: 2024-09-29

## 2024-09-29 RX ORDER — PROCHLORPERAZINE EDISYLATE 5 MG/ML
10 INJECTION INTRAMUSCULAR; INTRAVENOUS EVERY 6 HOURS PRN
Status: CANCELLED | OUTPATIENT
Start: 2024-09-29

## 2024-09-29 RX ORDER — PROMETHAZINE HYDROCHLORIDE 25 MG/1
12.5 SUPPOSITORY RECTAL EVERY 6 HOURS PRN
Status: DISCONTINUED | OUTPATIENT
Start: 2024-09-29 | End: 2024-10-08 | Stop reason: HOSPADM

## 2024-09-29 RX ADMIN — METOCLOPRAMIDE 10 MG: 5 INJECTION, SOLUTION INTRAMUSCULAR; INTRAVENOUS at 23:34

## 2024-09-29 RX ADMIN — PANTOPRAZOLE SODIUM 40 MG: 40 INJECTION, POWDER, FOR SOLUTION INTRAVENOUS at 10:05

## 2024-09-29 RX ADMIN — POTASSIUM CHLORIDE 10 MEQ: 7.46 INJECTION, SOLUTION INTRAVENOUS at 07:24

## 2024-09-29 RX ADMIN — METOCLOPRAMIDE 10 MG: 5 INJECTION, SOLUTION INTRAMUSCULAR; INTRAVENOUS at 05:56

## 2024-09-29 RX ADMIN — METOCLOPRAMIDE 10 MG: 5 INJECTION, SOLUTION INTRAMUSCULAR; INTRAVENOUS at 14:46

## 2024-09-29 RX ADMIN — PROCHLORPERAZINE EDISYLATE 10 MG: 5 INJECTION INTRAMUSCULAR; INTRAVENOUS at 18:54

## 2024-09-29 RX ADMIN — SODIUM CHLORIDE, PRESERVATIVE FREE 10 ML: 5 INJECTION INTRAVENOUS at 21:12

## 2024-09-29 RX ADMIN — PIPERACILLIN AND TAZOBACTAM 4500 MG: 4; .5 INJECTION, POWDER, FOR SOLUTION INTRAVENOUS at 14:50

## 2024-09-29 RX ADMIN — ONDANSETRON 4 MG: 2 INJECTION INTRAMUSCULAR; INTRAVENOUS at 04:55

## 2024-09-29 RX ADMIN — ONDANSETRON 4 MG: 2 INJECTION INTRAMUSCULAR; INTRAVENOUS at 20:48

## 2024-09-29 RX ADMIN — POTASSIUM CHLORIDE 10 MEQ: 7.46 INJECTION, SOLUTION INTRAVENOUS at 09:09

## 2024-09-29 RX ADMIN — METHIMAZOLE 10 MG: 10 TABLET ORAL at 21:11

## 2024-09-29 RX ADMIN — BARIUM SULFATE 50 ML: 0.6 SUSPENSION ORAL at 12:51

## 2024-09-29 RX ADMIN — HYDRALAZINE HYDROCHLORIDE 10 MG: 20 INJECTION INTRAMUSCULAR; INTRAVENOUS at 16:39

## 2024-09-29 RX ADMIN — POTASSIUM CHLORIDE 10 MEQ: 7.46 INJECTION, SOLUTION INTRAVENOUS at 12:00

## 2024-09-29 RX ADMIN — PROCHLORPERAZINE EDISYLATE 10 MG: 5 INJECTION INTRAMUSCULAR; INTRAVENOUS at 09:55

## 2024-09-29 RX ADMIN — METHIMAZOLE 10 MG: 10 TABLET ORAL at 10:59

## 2024-09-29 RX ADMIN — ENOXAPARIN SODIUM 30 MG: 100 INJECTION SUBCUTANEOUS at 20:48

## 2024-09-29 RX ADMIN — POTASSIUM CHLORIDE 10 MEQ: 7.46 INJECTION, SOLUTION INTRAVENOUS at 10:38

## 2024-09-29 RX ADMIN — PIPERACILLIN AND TAZOBACTAM 4500 MG: 4; .5 INJECTION, POWDER, FOR SOLUTION INTRAVENOUS at 23:05

## 2024-09-29 RX ADMIN — METHIMAZOLE 10 MG: 10 TABLET ORAL at 14:50

## 2024-09-29 RX ADMIN — ENOXAPARIN SODIUM 30 MG: 100 INJECTION SUBCUTANEOUS at 10:05

## 2024-09-29 RX ADMIN — HYDRALAZINE HYDROCHLORIDE 10 MG: 20 INJECTION INTRAMUSCULAR; INTRAVENOUS at 20:48

## 2024-09-29 RX ADMIN — METOCLOPRAMIDE 10 MG: 5 INJECTION, SOLUTION INTRAMUSCULAR; INTRAVENOUS at 18:47

## 2024-09-29 RX ADMIN — LEUCINE, PHENYLALANINE, LYSINE, METHIONINE, ISOLEUCINE, VALINE, HISTIDINE, THREONINE, TRYPTOPHAN, ALANINE, GLYCINE, ARGININE, PROLINE, SERINE, TYROSINE, DEXTROSE 85 ML/HR: 311; 238; 247; 170; 255; 247; 204; 179; 77; 880; 438; 489; 289; 213; 17; 5 INJECTION INTRAVENOUS at 18:36

## 2024-09-29 RX ADMIN — ONDANSETRON 4 MG: 2 INJECTION INTRAMUSCULAR; INTRAVENOUS at 12:06

## 2024-09-29 RX ADMIN — PIPERACILLIN AND TAZOBACTAM 4500 MG: 4; .5 INJECTION, POWDER, FOR SOLUTION INTRAVENOUS at 03:30

## 2024-09-29 RX ADMIN — SODIUM CHLORIDE: 9 INJECTION, SOLUTION INTRAVENOUS at 23:05

## 2024-09-29 ASSESSMENT — PAIN SCALES - GENERAL: PAINLEVEL_OUTOF10: 0

## 2024-09-29 NOTE — PROGRESS NOTES
Gastroenterology  Progress Note    9/29/2024 12:02 PM  Subjective:   Admit Date: 9/26/2024    Interval History: Patient presented with diverticulitis nausea and vomits has doctors plan to conservative therapy plan to start PPI twice daily and upper endoscopy diagnostic on Monday.    9/29/2024 clinically improved patient for EGD tomorrow    Diet: Diet NPO Exceptions are: Sips of Clear Liquids    Medications:   Scheduled Meds:    pantoprazole (PROTONIX) 40 mg in sodium chloride (PF) 0.9 % 10 mL injection  40 mg IntraVENous Daily    piperacillin-tazobactam  4,500 mg IntraVENous q8h    metoclopramide  10 mg IntraVENous Q6H    sodium chloride flush  5-40 mL IntraVENous 2 times per day    diclofenac sodium  2 g Topical BID    hydroxychloroquine  200 mg Oral Daily    lisinopril  40 mg Oral Daily    montelukast  10 mg Oral Nightly    methIMAzole  10 mg Oral TID    enoxaparin  30 mg SubCUTAneous BID    insulin lispro  0-4 Units SubCUTAneous Q6H     Continuous Infusions:    CLINIMIX 4.25/5      CLINIMIX 4.25/5 85 mL/hr (09/28/24 1827)    sodium chloride      dextrose         CBC:   Recent Labs     09/27/24 0458 09/28/24  0333 09/29/24  0531   WBC 10.1 11.4* 11.4*   HGB 10.0* 10.1* 10.3*    233 248     BMP:    Recent Labs     09/27/24  0458 09/28/24  0333 09/28/24  1516 09/29/24  0531    137  --  135   K 3.5 3.1* 3.5 3.3*    100  --  100   CO2 27 24  --  22*   BUN 13 14  --  20   CREATININE 0.4 0.4  --  0.3*   GLUCOSE 114* 120*  --  147*     Hepatic:   Recent Labs     09/27/24 0458 09/28/24  0333 09/29/24  0531   AST 37 40 30   ALT 28 31 28   BILITOT 1.1 1.1 1.0   ALKPHOS 110 125 124     INR: No results for input(s): \"INR\" in the last 72 hours.    Imaging:  No results found for this or any previous visit.    Results for orders placed during the hospital encounter of 09/26/24    CT ABDOMEN PELVIS W IV CONTRAST Additional Contrast? None    Narrative  PROCEDURE: CT ABDOMEN PELVIS W IV CONTRAST    CLINICAL

## 2024-09-29 NOTE — PROGRESS NOTES
[facility] [place]   Dr. Nas Nuñez MD  Daily Progress Note  Pt Name: Baylee Irizarry  Medical Record Number: [id number]  Date of Birth 1955   Today's Date: 9/29/2024    HD#3 seen for Dr Mejia    CHIEF COMPLAINTdiverticulitis    SUBJECTIVE  Patient persists with n/v    OBJECTIVE  CURRENT VITALS BP (!) 158/63   Pulse 89   Temp 98.4 °F (36.9 °C) (Oral)   Resp 18   Ht 1.575 m (5' 2\")   Wt 130.6 kg (288 lb)   SpO2 98%   BMI 52.68 kg/m²   LUNGS: Lungs clear   ABDOMEN: soft obese  WOUNDS: n/a  24 HR INTAKE/OUTPUT :   Intake/Output Summary (Last 24 hours) at 9/29/2024 1232  Last data filed at 9/29/2024 0613  Gross per 24 hour   Intake 435.58 ml   Output --   Net 435.58 ml     DRAIN/TUBE OUTPUT :      LABS  CBC :   Lab Results   Component Value Date/Time    WBC 11.4 09/29/2024 05:31 AM    HGB 10.3 09/29/2024 05:31 AM    HCT 35.6 09/29/2024 05:31 AM     09/29/2024 05:31 AM     BMP:   Lab Results   Component Value Date/Time     09/29/2024 05:31 AM    K 3.3 09/29/2024 05:31 AM     09/29/2024 05:31 AM    CO2 22 09/29/2024 05:31 AM    BUN 20 09/29/2024 05:31 AM    CREATININE 0.3 09/29/2024 05:31 AM    MG 2.2 09/28/2024 03:33 AM    PHOS 3.7 09/28/2024 03:33 AM       ASSESSMENT  1. Pt going down for fluroscopy      PLAN  1. Cont W/U      Nas Nuñez MD  Electronically signed 9/29/2024 at 12:32 PM

## 2024-09-29 NOTE — PROGRESS NOTES
Pharmacy Consult for TPN/PPN Monitoring & Adjustment  IV Access: peripheral      Dosing weight: 70.2 kg  Current insulin regimen: low sliding scale  Diet (excluding TPN): NPO  Maintenance fluid: none    Plan:  Continue with Clinimix 4.25/5 at 85 ml/hr    Electrolyte Replacement:   Potassium chloride: 30 mEq IV    Monitoring:  BMP, Mag, iCal, & Phos ordered for tomorrow with AM labs.    Pharmacy will continue to follow daily. Thank you for the consult.  Howard Lacy RPh 9/29/2024 10:45 AM

## 2024-09-29 NOTE — PLAN OF CARE
Problem: Chronic Conditions and Co-morbidities  Goal: Patient's chronic conditions and co-morbidity symptoms are monitored and maintained or improved  9/28/2024 2232 by Luersman, Kristen, RN  Outcome: Progressing  Flowsheets (Taken 9/28/2024 2232)  Care Plan - Patient's Chronic Conditions and Co-Morbidity Symptoms are Monitored and Maintained or Improved: Monitor and assess patient's chronic conditions and comorbid symptoms for stability, deterioration, or improvement     Problem: Pain  Goal: Verbalizes/displays adequate comfort level or baseline comfort level  9/28/2024 2232 by Luersman, Kristen, RN  Outcome: Progressing  Flowsheets (Taken 9/28/2024 2232)  Verbalizes/displays adequate comfort level or baseline comfort level:   Encourage patient to monitor pain and request assistance   Assess pain using appropriate pain scale     Problem: Safety - Adult  Goal: Free from fall injury  9/28/2024 2232 by Luersman, Kristen, RN  Outcome: Progressing  Flowsheets (Taken 9/28/2024 2232)  Free From Fall Injury: Instruct family/caregiver on patient safety     Problem: Skin/Tissue Integrity  Goal: Absence of new skin breakdown  Description: 1.  Monitor for areas of redness and/or skin breakdown  2.  Assess vascular access sites hourly  3.  Every 4-6 hours minimum:  Change oxygen saturation probe site  4.  Every 4-6 hours:  If on nasal continuous positive airway pressure, respiratory therapy assess nares and determine need for appliance change or resting period.  9/28/2024 2232 by Luersman, Kristen, RN  Outcome: Progressing     Problem: Gastrointestinal - Adult  Goal: Minimal or absence of nausea and vomiting  9/28/2024 2232 by Luersman, Kristen, RN  Outcome: Progressing  Flowsheets (Taken 9/28/2024 2232)  Minimal or absence of nausea and vomiting:   Administer IV fluids as ordered to ensure adequate hydration   Maintain NPO status until nausea and vomiting are resolved   Provide nonpharmacologic comfort measures as

## 2024-09-29 NOTE — PLAN OF CARE
Problem: Chronic Conditions and Co-morbidities  Goal: Patient's chronic conditions and co-morbidity symptoms are monitored and maintained or improved  9/29/2024 1137 by Haroon Lawton, RN  Outcome: Progressing  Flowsheets (Taken 9/29/2024 1137)  Care Plan - Patient's Chronic Conditions and Co-Morbidity Symptoms are Monitored and Maintained or Improved:   Monitor and assess patient's chronic conditions and comorbid symptoms for stability, deterioration, or improvement   Collaborate with multidisciplinary team to address chronic and comorbid conditions and prevent exacerbation or deterioration   Update acute care plan with appropriate goals if chronic or comorbid symptoms are exacerbated and prevent overall improvement and discharge     Problem: Pain  Goal: Verbalizes/displays adequate comfort level or baseline comfort level  9/29/2024 1137 by Haroon Lawton, RN  Outcome: Progressing  Flowsheets (Taken 9/29/2024 1137)  Verbalizes/displays adequate comfort level or baseline comfort level:   Encourage patient to monitor pain and request assistance   Administer analgesics based on type and severity of pain and evaluate response   Consider cultural and social influences on pain and pain management   Assess pain using appropriate pain scale   Implement non-pharmacological measures as appropriate and evaluate response   Notify Licensed Independent Practitioner if interventions unsuccessful or patient reports new pain     Problem: Safety - Adult  Goal: Free from fall injury  9/29/2024 1137 by Haroon Lawton, RN  Outcome: Progressing  Flowsheets (Taken 9/29/2024 1137)  Free From Fall Injury: Instruct family/caregiver on patient safety     Problem: Skin/Tissue Integrity  Goal: Absence of new skin breakdown  Description: 1.  Monitor for areas of redness and/or skin breakdown  2.  Assess vascular access sites hourly  3.  Every 4-6 hours minimum:  Change oxygen saturation probe site  4.  Every 4-6 hours:  If on nasal continuous

## 2024-09-29 NOTE — PROGRESS NOTES
Hospitalist Progress Note    Patient:  Baylee Irizarry      Unit/Bed:5K-11/011-A    YOB: 1955    MRN: [id number]       Acct: [id number]     PCP: Myriam Sun APRN - CNP    Date of Admission: 9/26/2024    Assessment/Plan:    Pericolonic fluid collection, possible abscess, diverticulitis: Irregular fluid collection, <3 cm seen on CT, elevated WBC 12.2, hemoglobin, endorses nausea and vomiting and abdominal pain. Continue IV Zosyn.  NPO. General surgery following. No surgical intervention planned. Plans to repeat imaging in 2-3 days pending clinical progress.   Intractable n/v x 4 weeks. UGI today. GI following Plans for PICC and TPN. Reglan started 9/28. Add PRN rectal phenergan to  IV Zofran / Compazine PRN.  Dysphagia. Consult to SLP.   Hypokalemia due to GI loss. Continue to replace per protocol. Mag noted to be 2.2.  Accelerated Hypertension, improved. Hx HTN: s/p IV Hydralazine 5 mg once on admission. Continue IV Hydralazing 10 mg every 6 hrs PRN SBP >170. Home ACE continued. Held HCTZ.  UTI(poa): Urine showed many bacteria and was orange. Culture E coli, sensitive to current treatment. Continue above Zosyn.  Hyperthyroidism, uncontrolled. Unable to keep orals down. TSH 0.005, free T4 3.42, free T3 5.4 Continue Tapazole 10 mg tid. Endocrinology consulted by general surgery.  Microcytic anemia. Hbg 10.3.   Leukocytosis: upward trend. Likely due to n/v.  T2DM: BS trend in goal. Accu-Cheks qid. Hypoglycemia protocol Low-dose SSI.  Asthma: Continue albuterol nebulizer q6h prn  RIRI: Continue home CPAP  Lupus: Continue home Plaquenil 200 qd.  Morbid obesity. BMI 52.68.      Chief Complaint: Fatigue and emesis     Hospital Course:     Baylee Irizarry is a 69 y.o. female with PMHx of Anemia, Anxiety, Asthma, CHF, COPD, MDD, DM2, HA, Lupus, HTN, Obesity, RIRI who presents to [facility] ED with CC:1 month emesis and difficulty eating. On 9/23 given potassium at [facility], PCP advised her to come

## 2024-09-29 NOTE — PROGRESS NOTES
IV team to room to attempt PICC placement so patient can start PPN/TPN.Patient refusing PPN yesterday until PICC placement. Patient now states she would rather wait until Monday to have PICC placed. Patient is now allowing PPN to be infused at this time.

## 2024-09-30 ENCOUNTER — ANESTHESIA EVENT (OUTPATIENT)
Dept: ENDOSCOPY | Age: 69
End: 2024-09-30
Payer: MEDICARE

## 2024-09-30 ENCOUNTER — ANESTHESIA (OUTPATIENT)
Dept: ENDOSCOPY | Age: 69
End: 2024-09-30
Payer: MEDICARE

## 2024-09-30 ENCOUNTER — APPOINTMENT (OUTPATIENT)
Dept: CT IMAGING | Age: 69
DRG: 357 | End: 2024-09-30
Payer: MEDICARE

## 2024-09-30 ENCOUNTER — APPOINTMENT (OUTPATIENT)
Dept: GENERAL RADIOLOGY | Age: 69
DRG: 357 | End: 2024-09-30
Payer: MEDICARE

## 2024-09-30 ENCOUNTER — APPOINTMENT (OUTPATIENT)
Dept: ENDOSCOPY | Age: 69
DRG: 357 | End: 2024-09-30
Attending: INTERNAL MEDICINE
Payer: MEDICARE

## 2024-09-30 LAB
ANION GAP SERPL CALC-SCNC: 8 MEQ/L (ref 8–16)
BASOPHILS ABSOLUTE: 0 THOU/MM3 (ref 0–0.1)
BASOPHILS NFR BLD AUTO: 0.3 %
BUN SERPL-MCNC: 22 MG/DL (ref 7–22)
CA-I BLD ISE-SCNC: 1.24 MMOL/L (ref 1.12–1.32)
CALCIUM SERPL-MCNC: 8.5 MG/DL (ref 8.5–10.5)
CHLORIDE SERPL-SCNC: 102 MEQ/L (ref 98–111)
CO2 SERPL-SCNC: 23 MEQ/L (ref 23–33)
CREAT SERPL-MCNC: 0.3 MG/DL (ref 0.4–1.2)
DEPRECATED RDW RBC AUTO: 38.6 FL (ref 35–45)
EOSINOPHIL NFR BLD AUTO: 0 %
EOSINOPHILS ABSOLUTE: 0 THOU/MM3 (ref 0–0.4)
ERYTHROCYTE [DISTWIDTH] IN BLOOD BY AUTOMATED COUNT: 13.9 % (ref 11.5–14.5)
GFR SERPL CREATININE-BSD FRML MDRD: > 90 ML/MIN/1.73M2
GLUCOSE BLD STRIP.AUTO-MCNC: 121 MG/DL (ref 70–108)
GLUCOSE BLD STRIP.AUTO-MCNC: 127 MG/DL (ref 70–108)
GLUCOSE BLD STRIP.AUTO-MCNC: 131 MG/DL (ref 70–108)
GLUCOSE SERPL-MCNC: 132 MG/DL (ref 70–108)
HCT VFR BLD AUTO: 31.5 % (ref 37–47)
HGB BLD-MCNC: 9.7 GM/DL (ref 12–16)
IMM GRANULOCYTES # BLD AUTO: 0.06 THOU/MM3 (ref 0–0.07)
IMM GRANULOCYTES NFR BLD AUTO: 0.5 %
LYMPHOCYTES ABSOLUTE: 1.5 THOU/MM3 (ref 1–4.8)
LYMPHOCYTES NFR BLD AUTO: 13.7 %
MAGNESIUM SERPL-MCNC: 1.5 MG/DL (ref 1.6–2.4)
MCH RBC QN AUTO: 23.7 PG (ref 26–33)
MCHC RBC AUTO-ENTMCNC: 30.8 GM/DL (ref 32.2–35.5)
MCV RBC AUTO: 76.8 FL (ref 81–99)
MONOCYTES ABSOLUTE: 1.2 THOU/MM3 (ref 0.4–1.3)
MONOCYTES NFR BLD AUTO: 10.7 %
NEUTROPHILS ABSOLUTE: 8.2 THOU/MM3 (ref 1.8–7.7)
NEUTROPHILS NFR BLD AUTO: 74.8 %
NRBC BLD AUTO-RTO: 0 /100 WBC
PHOSPHATE SERPL-MCNC: 2.2 MG/DL (ref 2.4–4.7)
PLATELET # BLD AUTO: 259 THOU/MM3 (ref 130–400)
PMV BLD AUTO: 13.5 FL (ref 9.4–12.4)
POTASSIUM SERPL-SCNC: 3.3 MEQ/L (ref 3.5–5.2)
RBC # BLD AUTO: 4.1 MILL/MM3 (ref 4.2–5.4)
SODIUM SERPL-SCNC: 133 MEQ/L (ref 135–145)
WBC # BLD AUTO: 11 THOU/MM3 (ref 4.8–10.8)

## 2024-09-30 PROCEDURE — 85025 COMPLETE CBC W/AUTO DIFF WBC: CPT

## 2024-09-30 PROCEDURE — 6370000000 HC RX 637 (ALT 250 FOR IP): Performed by: STUDENT IN AN ORGANIZED HEALTH CARE EDUCATION/TRAINING PROGRAM

## 2024-09-30 PROCEDURE — 3609017100 HC EGD: Performed by: INTERNAL MEDICINE

## 2024-09-30 PROCEDURE — 80048 BASIC METABOLIC PNL TOTAL CA: CPT

## 2024-09-30 PROCEDURE — 82330 ASSAY OF CALCIUM: CPT

## 2024-09-30 PROCEDURE — 84100 ASSAY OF PHOSPHORUS: CPT

## 2024-09-30 PROCEDURE — 0DJ08ZZ INSPECTION OF UPPER INTESTINAL TRACT, VIA NATURAL OR ARTIFICIAL OPENING ENDOSCOPIC: ICD-10-PCS | Performed by: INTERNAL MEDICINE

## 2024-09-30 PROCEDURE — 2580000003 HC RX 258: Performed by: REGISTERED NURSE

## 2024-09-30 PROCEDURE — 6360000002 HC RX W HCPCS: Performed by: NURSE PRACTITIONER

## 2024-09-30 PROCEDURE — 83735 ASSAY OF MAGNESIUM: CPT

## 2024-09-30 PROCEDURE — 6360000002 HC RX W HCPCS: Performed by: STUDENT IN AN ORGANIZED HEALTH CARE EDUCATION/TRAINING PROGRAM

## 2024-09-30 PROCEDURE — 2500000003 HC RX 250 WO HCPCS: Performed by: NURSE PRACTITIONER

## 2024-09-30 PROCEDURE — 2580000003 HC RX 258: Performed by: NURSE PRACTITIONER

## 2024-09-30 PROCEDURE — 76937 US GUIDE VASCULAR ACCESS: CPT

## 2024-09-30 PROCEDURE — 6370000000 HC RX 637 (ALT 250 FOR IP): Performed by: INTERNAL MEDICINE

## 2024-09-30 PROCEDURE — 36415 COLL VENOUS BLD VENIPUNCTURE: CPT

## 2024-09-30 PROCEDURE — 71045 X-RAY EXAM CHEST 1 VIEW: CPT

## 2024-09-30 PROCEDURE — 7100000010 HC PHASE II RECOVERY - FIRST 15 MIN: Performed by: INTERNAL MEDICINE

## 2024-09-30 PROCEDURE — 74177 CT ABD & PELVIS W/CONTRAST: CPT

## 2024-09-30 PROCEDURE — 1200000003 HC TELEMETRY R&B

## 2024-09-30 PROCEDURE — 6360000002 HC RX W HCPCS: Performed by: INTERNAL MEDICINE

## 2024-09-30 PROCEDURE — 2580000003 HC RX 258: Performed by: INTERNAL MEDICINE

## 2024-09-30 PROCEDURE — 7100000011 HC PHASE II RECOVERY - ADDTL 15 MIN: Performed by: INTERNAL MEDICINE

## 2024-09-30 PROCEDURE — 99232 SBSQ HOSP IP/OBS MODERATE 35: CPT | Performed by: NURSE PRACTITIONER

## 2024-09-30 PROCEDURE — 36569 INSJ PICC 5 YR+ W/O IMAGING: CPT

## 2024-09-30 PROCEDURE — 6360000002 HC RX W HCPCS: Performed by: PHARMACIST

## 2024-09-30 PROCEDURE — 6360000004 HC RX CONTRAST MEDICATION: Performed by: SURGERY

## 2024-09-30 PROCEDURE — 3700000000 HC ANESTHESIA ATTENDED CARE: Performed by: INTERNAL MEDICINE

## 2024-09-30 PROCEDURE — 02HV33Z INSERTION OF INFUSION DEVICE INTO SUPERIOR VENA CAVA, PERCUTANEOUS APPROACH: ICD-10-PCS | Performed by: INTERNAL MEDICINE

## 2024-09-30 PROCEDURE — C1751 CATH, INF, PER/CENT/MIDLINE: HCPCS

## 2024-09-30 PROCEDURE — 2500000003 HC RX 250 WO HCPCS: Performed by: REGISTERED NURSE

## 2024-09-30 PROCEDURE — 2580000003 HC RX 258: Performed by: STUDENT IN AN ORGANIZED HEALTH CARE EDUCATION/TRAINING PROGRAM

## 2024-09-30 PROCEDURE — 6360000002 HC RX W HCPCS: Performed by: REGISTERED NURSE

## 2024-09-30 PROCEDURE — 99232 SBSQ HOSP IP/OBS MODERATE 35: CPT | Performed by: SURGERY

## 2024-09-30 PROCEDURE — 82948 REAGENT STRIP/BLOOD GLUCOSE: CPT

## 2024-09-30 RX ORDER — MAGNESIUM SULFATE IN WATER 40 MG/ML
2000 INJECTION, SOLUTION INTRAVENOUS ONCE
Status: COMPLETED | OUTPATIENT
Start: 2024-09-30 | End: 2024-09-30

## 2024-09-30 RX ORDER — PROPOFOL 10 MG/ML
INJECTION, EMULSION INTRAVENOUS
Status: DISCONTINUED | OUTPATIENT
Start: 2024-09-30 | End: 2024-09-30 | Stop reason: SDUPTHER

## 2024-09-30 RX ORDER — SODIUM CHLORIDE 9 MG/ML
INJECTION, SOLUTION INTRAVENOUS
Status: DISCONTINUED | OUTPATIENT
Start: 2024-09-30 | End: 2024-09-30 | Stop reason: SDUPTHER

## 2024-09-30 RX ORDER — MIDAZOLAM HYDROCHLORIDE 1 MG/ML
INJECTION INTRAMUSCULAR; INTRAVENOUS
Status: DISCONTINUED | OUTPATIENT
Start: 2024-09-30 | End: 2024-09-30 | Stop reason: SDUPTHER

## 2024-09-30 RX ORDER — SUCRALFATE 1 G/1
1 TABLET ORAL
Status: DISCONTINUED | OUTPATIENT
Start: 2024-09-30 | End: 2024-10-08 | Stop reason: HOSPADM

## 2024-09-30 RX ORDER — IOPAMIDOL 755 MG/ML
80 INJECTION, SOLUTION INTRAVASCULAR
Status: COMPLETED | OUTPATIENT
Start: 2024-09-30 | End: 2024-09-30

## 2024-09-30 RX ADMIN — ENOXAPARIN SODIUM 30 MG: 100 INJECTION SUBCUTANEOUS at 20:33

## 2024-09-30 RX ADMIN — MONTELUKAST 10 MG: 10 TABLET, FILM COATED ORAL at 20:33

## 2024-09-30 RX ADMIN — Medication 25 MG: at 15:40

## 2024-09-30 RX ADMIN — PROCHLORPERAZINE EDISYLATE 10 MG: 5 INJECTION INTRAMUSCULAR; INTRAVENOUS at 21:33

## 2024-09-30 RX ADMIN — PANTOPRAZOLE SODIUM 40 MG: 40 INJECTION, POWDER, FOR SOLUTION INTRAVENOUS at 11:02

## 2024-09-30 RX ADMIN — IOPAMIDOL 80 ML: 755 INJECTION, SOLUTION INTRAVENOUS at 13:58

## 2024-09-30 RX ADMIN — SODIUM CHLORIDE: 9 INJECTION, SOLUTION INTRAVENOUS at 15:36

## 2024-09-30 RX ADMIN — SODIUM CHLORIDE, PRESERVATIVE FREE 10 ML: 5 INJECTION INTRAVENOUS at 11:00

## 2024-09-30 RX ADMIN — ONDANSETRON 4 MG: 2 INJECTION INTRAMUSCULAR; INTRAVENOUS at 10:59

## 2024-09-30 RX ADMIN — METHIMAZOLE 10 MG: 10 TABLET ORAL at 20:33

## 2024-09-30 RX ADMIN — PROCHLORPERAZINE EDISYLATE 10 MG: 5 INJECTION INTRAMUSCULAR; INTRAVENOUS at 12:56

## 2024-09-30 RX ADMIN — PIPERACILLIN AND TAZOBACTAM 4500 MG: 4; .5 INJECTION, POWDER, FOR SOLUTION INTRAVENOUS at 11:08

## 2024-09-30 RX ADMIN — MAGNESIUM SULFATE HEPTAHYDRATE 2000 MG: 40 INJECTION, SOLUTION INTRAVENOUS at 13:01

## 2024-09-30 RX ADMIN — METOCLOPRAMIDE 10 MG: 5 INJECTION, SOLUTION INTRAMUSCULAR; INTRAVENOUS at 05:15

## 2024-09-30 RX ADMIN — METOCLOPRAMIDE 10 MG: 5 INJECTION, SOLUTION INTRAMUSCULAR; INTRAVENOUS at 12:36

## 2024-09-30 RX ADMIN — SODIUM CHLORIDE: 234 INJECTION INTRAMUSCULAR; INTRAVENOUS; SUBCUTANEOUS at 18:05

## 2024-09-30 RX ADMIN — SUCRALFATE 1 G: 1 TABLET ORAL at 18:05

## 2024-09-30 RX ADMIN — HYDRALAZINE HYDROCHLORIDE 10 MG: 20 INJECTION INTRAMUSCULAR; INTRAVENOUS at 20:40

## 2024-09-30 RX ADMIN — PROPOFOL 50 MG: 10 INJECTION, EMULSION INTRAVENOUS at 15:40

## 2024-09-30 RX ADMIN — METOCLOPRAMIDE 10 MG: 5 INJECTION, SOLUTION INTRAMUSCULAR; INTRAVENOUS at 17:41

## 2024-09-30 RX ADMIN — ONDANSETRON 4 MG: 2 INJECTION INTRAMUSCULAR; INTRAVENOUS at 05:15

## 2024-09-30 RX ADMIN — POTASSIUM PHOSPHATE, MONOBASIC POTASSIUM PHOSPHATE, DIBASIC 20 MMOL: 224; 236 INJECTION, SOLUTION, CONCENTRATE INTRAVENOUS at 13:31

## 2024-09-30 RX ADMIN — MIDAZOLAM 2 MG: 1 INJECTION INTRAMUSCULAR; INTRAVENOUS at 15:40

## 2024-09-30 RX ADMIN — LEUCINE, PHENYLALANINE, LYSINE, METHIONINE, ISOLEUCINE, VALINE, HISTIDINE, THREONINE, TRYPTOPHAN, ALANINE, GLYCINE, ARGININE, PROLINE, SERINE, TYROSINE, DEXTROSE 85 ML/HR: 311; 238; 247; 170; 255; 247; 204; 179; 77; 880; 438; 489; 289; 213; 17; 5 INJECTION INTRAVENOUS at 11:10

## 2024-09-30 RX ADMIN — PIPERACILLIN AND TAZOBACTAM 4500 MG: 4; .5 INJECTION, POWDER, FOR SOLUTION INTRAVENOUS at 17:39

## 2024-09-30 RX ADMIN — POTASSIUM BICARBONATE 40 MEQ: 782 TABLET, EFFERVESCENT ORAL at 05:42

## 2024-09-30 RX ADMIN — PROCHLORPERAZINE EDISYLATE 10 MG: 5 INJECTION INTRAMUSCULAR; INTRAVENOUS at 00:58

## 2024-09-30 ASSESSMENT — PAIN DESCRIPTION - DESCRIPTORS: DESCRIPTORS: SORE

## 2024-09-30 ASSESSMENT — PAIN - FUNCTIONAL ASSESSMENT
PAIN_FUNCTIONAL_ASSESSMENT: NONE - DENIES PAIN
PAIN_FUNCTIONAL_ASSESSMENT: NONE - DENIES PAIN

## 2024-09-30 NOTE — PROGRESS NOTES
EGD complete, no biopsies taken. photos taken, pt tolerated procedure well. Scope Number  used.

## 2024-09-30 NOTE — PROGRESS NOTES
[facility]  SPEECH THERAPY MISSED TREATMENT NOTE  STRZ ONC MED 5K      Date: 2024  Patient Name: Baylee Irizarry        MRN: [id number]    : 1955  (69 y.o.)    REASON FOR MISSED TREATMENT:  Attempted to see patient at 0840, VICKIE Giles informed  patient was receiving PICC line placement and will be NPO with plans for procedure later this afternoon. ST attempted to determine if patient appropriate at 0930 and VICKIE Giles declined ST intervention. Will f/u as schedule permitting for clinical swallow evaluation.     Kori Hernandez M.S. CCC-SLP [id number]

## 2024-09-30 NOTE — PROGRESS NOTES
PICC Procedure Note    Baylee Irizarry   Admitted- 9/26/2024  9:34 AM  Admission diagnosis- Diverticulitis [K57.92]  Diverticulitis of large intestine with abscess, unspecified bleeding status [K57.20]      Attending Physician- Mariza Gibson,*  Ordering Physician-Cecile Cunningham APRN - CNP   Indication for Insertion: TPN    Catheter Insertion Date- 9/30/2024   Lot Number- [id number]  Frisian-4  Lumen-dual    Insertion Site- ALEXIS Basilic  Vein Circumference- 1.36 cm  Catheter Length- 43 cm  Internal Length- 43 cm  Exposed Catheter Length- 0cm   Upper Arm Circumference- 43cm  Tip Confirmation System Bundle met- Yes  If X-ray required, Tip Location- SVC  Radiologist- Dr. Nivia Knight     PICC insertion successful- Yes  Ultrasound- yes    Okay To Use PICC- Yes    Electronically signed by Khloe Contreras RN on 9/30/2024 at 10:35AM

## 2024-09-30 NOTE — PROGRESS NOTES
Pharmacy Consult for TPN/PPN Initiation    Indication for TPN: NPO  Ordering Provider: Cecile Cunningham          IV Access: central  Dosing weight: 70 kg  Current insulin regimen: low sliding scale  Maintenance fluid: none    Assessment/ Plan:  Begin 3-in-1 TPN today with the ingredients listed below:    Date 09/30/24   Total kcal/kg 15   Total kcal 1050   Protein g/kg 1.5   Protein g 105   Protein kcal (4 kcal/g) 420   Lipid % 30   Lipid g 31.5   Lipid kcal (10 kcal/g) 315   Dextrose g 92.6   Dextrose kcal (3.4 kcal/g) 315   Infusion Rate (mL/hr) 90   Na Acetate (mEq) 100   Na Chloride (mEq) 100   Na Phos (mmol)  (3 mmol = 4 mEq Na) 0   Total Na (mEq) 200   K Acetate (mEq) 40   K Chloride (mEq) 40   K Phos (mmol)  (15 mmol = 22 mEq K) 25   Total K (mEq) 116.67   Ca Gluconate (mEq)  (1 g = 4.65 mEq) 0   Mag Sulfate (mEq)  (1 g = 8.12 mEq) 16.24   Multivitamin (mL) 10   Trace Elements (mL) 1     Electrolyte Replacement:   Potassium chloride: 40 mEq  Magnesium sulfate: 2 g  Potassium phosphate: 20 mmol    Monitoring:  BMP, Mag, iCal, & Phos ordered for tomorrow with AM labs.    Pharmacy will continue to follow daily. Thank you for the consult.  Julianne Ruano, PharmD 9/30/2024 9:10 AM

## 2024-09-30 NOTE — BRIEF OP NOTE
Brief Postoperative Note      Patient: Baylee Irizarry  YOB: 1955  MRN: [id number]    Date of Procedure: 9/30/2024    Pre-Op Diagnosis Codes:      * Diverticulitis [K57.92]    Post-Op Diagnosis: Normal esophagus no dilation done gastritis seen significant in the body and antrum with few inflammatory polyps no biopsy obtained at this time       Procedure(s):  EGD    Surgeon(s):  Yadira Dumont MD    Assistant:  * No surgical staff found *    Anesthesia: Monitor Anesthesia Care    Estimated Blood Loss (mL): none    Complications: None    Specimens:   * No specimens in log *    Implants:  * No implants in log *      Drains: * No LDAs found *    Findings:   Normal esophagus no dilation done gastritis seen significant in the body and antrum with few inflammatory polyps no biopsy obtained at this time      Infection Present At Time Of Surgery (PATOS) (choose all levels that have infection present):  No infection present  Other Findings:      Electronically signed by Yadira Dumont MD on 9/30/2024 at 3:49 PM

## 2024-09-30 NOTE — ANESTHESIA PRE PROCEDURE
Department of Anesthesiology  Preprocedure Note       Name:  Baylee Irizarry   Age:  69 y.o.  :  1955                                          MRN:  [id number]         Date:  2024      Surgeon: Surgeon(s):  Yadira Dumont MD    Procedure: Procedure(s):  EGD    Medications prior to admission:   Prior to Admission medications    Medication Sig Start Date End Date Taking? Authorizing Provider   hydroxychloroquine (PLAQUENIL) 200 MG tablet Take by mouth daily   Yes Wayne Estes MD   RA ALLERGY RELIEF 10 MG tablet  6/29/15  Yes Wayne Estes MD   insulin aspart (NOVOLOG FLEXPEN) 100 UNIT/ML injection pen Inject into the skin 3 times daily (before meals)   Yes Wayne Estes MD   desoximetasone (TOPICORT) 0.25 % cream Apply topically 2 times daily Apply topically 2 times daily.   Yes Wayne Estes MD   diclofenac sodium 1 % GEL Apply 2 g topically 2 times daily   Yes Wayne Estes MD   mometasone-formoterol (DULERA) 100-5 MCG/ACT inhaler Inhale 2 puffs into the lungs 2 times daily   Yes Wayne Estes MD   albuterol (ACCUNEB) 0.63 MG/3ML nebulizer solution Take 3 mLs by nebulization every 6 hours as needed   Yes Wayne Estes MD   albuterol (PROVENTIL) (2.5 MG/3ML) 0.083% nebulizer solution Take 3 mLs by nebulization every 6 hours as needed   Yes Wayne Estes MD   montelukast (SINGULAIR) 10 MG tablet Take 1 tablet by mouth nightly   Yes Wayne Estes MD   lisinopril (PRINIVIL;ZESTRIL) 20 MG tablet Take 2 tablets by mouth daily   Yes Wayne Estes MD   amoxicillin (AMOXIL) 250 MG/5ML suspension Take by mouth 3 times daily    Wayne Estes MD   hydrochlorothiazide (HYDRODIURIL) 12.5 MG tablet  8/3/15   Wayne Estes MD   mupirocin (BACTROBAN) 2 % ointment Apply topically 3 times daily Apply topically 3 times daily.    Wayne Estes MD       Current medications:    Current Facility-Administered Medications

## 2024-09-30 NOTE — PROGRESS NOTES
[facility] GENERAL SURGERY  General Surgery Postoperative Progress Note    Pt Name: Baylee Irizarry  Medical Record Number: [id number]  Date of Birth 1955   Today's Date: 9/30/2024    CC:  Chief Complaint   Patient presents with    Fatigue    Emesis       ASSESSMENT   HD # 4  Continues to have nausea and vomiting workup in progress  For EGD today  She has had a prior cholecystectomy even though is not listed as a prior surgery  White count is 11 k last 3 days and having BM's  Recheck CT today to eval fluid collections noted on initial CT scan  Has been afebrile since midnight  PLAN   Continue current IV antibiotics  Will repeat the CT scan today to evaluate the progression or resolution of these poorly defined fluid collections  SUBJECTIVE   Baylee is doing ok.  Sitting up in chair, burping alot  CURRENT MEDICATIONS   Scheduled Meds:   pantoprazole (PROTONIX) 40 mg in sodium chloride (PF) 0.9 % 10 mL injection  40 mg IntraVENous Daily    piperacillin-tazobactam  4,500 mg IntraVENous q8h    metoclopramide  10 mg IntraVENous Q6H    sodium chloride flush  5-40 mL IntraVENous 2 times per day    diclofenac sodium  2 g Topical BID    hydroxychloroquine  200 mg Oral Daily    lisinopril  40 mg Oral Daily    montelukast  10 mg Oral Nightly    methIMAzole  10 mg Oral TID    enoxaparin  30 mg SubCUTAneous BID    insulin lispro  0-4 Units SubCUTAneous Q6H     Continuous Infusions:   CLINIMIX 4.25/5 85 mL/hr (09/29/24 1836)    sodium chloride 20 mL/hr at 09/29/24 2305    dextrose       PRN Meds:.promethazine, hydrALAZINE, calcium carbonate, sodium chloride flush, sodium chloride, potassium chloride **OR** potassium alternative oral replacement **OR** potassium chloride, magnesium sulfate, ondansetron **OR** ondansetron, polyethylene glycol, acetaminophen **OR** acetaminophen, prochlorperazine, albuterol, glucose, dextrose bolus **OR** dextrose bolus, glucagon (rDNA), dextrose  ROS:   History  --  0.3*  --  0.3*   MG 1.3* 2.2  --   --   --   --    PHOS 3.3 3.7  --   --   --   --    CALCIUM  --  8.3*  --  8.4*  --  8.5    < > = values in this interval not displayed.      No results for input(s): \"INR\" in the last 72 hours.    Invalid input(s): \"PT\", \"PTT\"  Recent Labs     09/28/24  0333 09/29/24  0531   AST 40 30   ALT 31 28   BILITOT 1.1 1.0   BILIDIR 0.7 0.6     No results for input(s): \"TROPONINT\" in the last 72 hours.  RADIOLOGY     FL UGI   Final Result      Oral contrast did pass into proximal small bowel loops. Initially, there was   some delayed passage through the distal esophagus and stomach.         **This report has been created using voice recognition software. It may contain   minor errors which are inherent in voice recognition technology.**         Electronically signed by Dr. Nivia Knight      XR CHEST (2 VW)   Final Result   Impression:   Chronic bronchitis versus viral bronchiolitis subtly suggested in the    upper lobes predominantly see above for discussion.      This document has been electronically signed by: Eduardo Pedroza III, MD on    09/27/2024 03:20 AM      CT ABDOMEN PELVIS W IV CONTRAST Additional Contrast? None   Final Result   1. Colonic diverticulosis with pericolonic fat stranding adjacent to the mid   sigmoid colon suggests diverticulitis. No bowel obstruction is identified. No   free air is observed. Ill-defined pockets of fluid along the lateral margin of   the sigmoid colon measure up to 23 mm in diameter (series 301, image 61)   suspicious for peridiverticular abscess. The fat stranding surrounding the   sigmoid colon also surrounds the urinary bladder and the urinary bladder wall   appears thickened which could indicate infectious/inflammatory cystitis   secondary to diverticulitis. Correlate with urinalysis. A somewhat elongated   collection of fluid in the midline (series 301, image 68) may represent a second   peridiverticular abscess. It appears to extend along

## 2024-09-30 NOTE — PROGRESS NOTES
09/28/24  0333 09/28/24  1516 09/29/24  0531 09/29/24  1429 09/30/24  0352 09/30/24  0950   NA  --  137  --  135  --  133*  --    K  --  3.1*   < > 3.3* 3.7 3.3*  --    CL  --  100  --  100  --  102  --    CO2  --  24  --  22*  --  23  --    BUN  --  14  --  20  --  22  --    CREATININE  --  0.4  --  0.3*  --  0.3*  --    CALCIUM  --  8.3*  --  8.4*  --  8.5  --    PHOS 3.3 3.7  --   --   --   --  2.2*    < > = values in this interval not displayed.     Recent Labs     09/28/24 0333 09/29/24 0531   AST 40 30   ALT 31 28   BILIDIR 0.7 0.6   BILITOT 1.1 1.0   ALKPHOS 125 124     No results for input(s): \"INR\" in the last 72 hours.  No results for input(s): \"CKTOTAL\", \"TROPONINI\" in the last 72 hours.    Urinalysis:      Lab Results   Component Value Date/Time    NITRU see below 09/26/2024 11:00 AM    WBCUA 15-25 09/26/2024 11:00 AM    BACTERIA MANY 09/26/2024 11:00 AM    RBCUA 3-5 09/26/2024 11:00 AM    BLOODU see below 09/26/2024 11:00 AM    GLUCOSEU see below 09/26/2024 11:00 AM       Radiology:  XR CHEST PORTABLE   Final Result   PICC line tip in the SVC. No pulmonary infiltrates.               **This report has been created using voice recognition software. It may contain   minor errors which are inherent in voice recognition technology.**      Electronically signed by Dr. Nivia Knight      FL UGI   Final Result      Oral contrast did pass into proximal small bowel loops. Initially, there was   some delayed passage through the distal esophagus and stomach.         **This report has been created using voice recognition software. It may contain   minor errors which are inherent in voice recognition technology.**         Electronically signed by Dr. Nivia Knight      XR CHEST (2 VW)   Final Result   Impression:   Chronic bronchitis versus viral bronchiolitis subtly suggested in the    upper lobes predominantly see above for discussion.      This document has been electronically signed by: Eduardo Pedroza III, MD  on    09/27/2024 03:20 AM      CT ABDOMEN PELVIS W IV CONTRAST Additional Contrast? None   Final Result   1. Colonic diverticulosis with pericolonic fat stranding adjacent to the mid   sigmoid colon suggests diverticulitis. No bowel obstruction is identified. No   free air is observed. Ill-defined pockets of fluid along the lateral margin of   the sigmoid colon measure up to 23 mm in diameter (series 301, image 61)   suspicious for peridiverticular abscess. The fat stranding surrounding the   sigmoid colon also surrounds the urinary bladder and the urinary bladder wall   appears thickened which could indicate infectious/inflammatory cystitis   secondary to diverticulitis. Correlate with urinalysis. A somewhat elongated   collection of fluid in the midline (series 301, image 68) may represent a second   peridiverticular abscess. It appears to extend along the post hysterectomy   portion of the pelvis. Continued follow-up to ensure resolution is advised.      2. Chronic findings are discussed.         **This report has been created using voice recognition software.  It may contain   minor errors which are inherent in voice recognition technology.**      Electronically signed by Dr. Kenia Sesay      CT ABDOMEN PELVIS W IV CONTRAST Additional Contrast? None    (Results Pending)       Diet: Diet NPO Exceptions are: Sips of Clear Liquids    DVT prophylaxis: [x] Lovenox                                 [] SCDs                                 [] SQ Heparin                                 [] Encourage ambulation           [] Already on Anticoagulation     Disposition:    [x] Home       [] TCU       [] Rehab       [] Psych       [] SNF       [] Long Term Care Facility       [] Other-    Code Status: Full Code    PT/OT Eval Status: No        Electronically signed by KALIN Barnhart CNP on 9/30/2024 at 12:47 PM

## 2024-09-30 NOTE — PROGRESS NOTES
Baylee admitted to Endo department and admitted to Endo Dept for egd with Dr. Dumont. Consent form signed and verified with pt.  Plan of care reviewed with patient.   Call light within reach.   Bed in lowest position, locked, with one bed rail up.   Appropriate arm bands on patient.   Bathroom offered.   All questions and concerns of patient addressed

## 2024-09-30 NOTE — ANESTHESIA POSTPROCEDURE EVALUATION
Department of Anesthesiology  Postprocedure Note    Patient: Baylee Irizarry  MRN: [id number]  YOB: 1955  Date of evaluation: 9/30/2024    Procedure Summary       Date: 09/30/24 Room / Location: [place] [address] / [facility]    Anesthesia Start: 1536 Anesthesia Stop: 1546    Procedure: EGD Diagnosis: Diverticulitis    Surgeons: Yadira Dumont MD Responsible Provider: Anup Skaggs MD    Anesthesia Type: MAC ASA Status: 3            Anesthesia Type: No value filed.    Henry Phase I: Henry Score: 10    Henry Phase II: Henry Score: 9    Anesthesia Post Evaluation    Patient location during evaluation: bedside  Patient participation: complete - patient participated  Level of consciousness: awake  Airway patency: patent  Nausea & Vomiting: no vomiting and no nausea  Cardiovascular status: hemodynamically stable  Respiratory status: acceptable  Hydration status: stable  Pain management: adequate    No notable events documented.

## 2024-09-30 NOTE — PROGRESS NOTES
Comprehensive Nutrition Assessment    Type and Reason for Visit:  Reassess    Nutrition Recommendations/Plan:   TPN changes for next bag- use 70 kgm dosing wt; 15 kcals/kgm, 1.5 gm protein/kgm and 30% kcals from lipids.  Will continue to increase TPN macronutrient as labs, NPO status allows.  Diet vs. NPO per GI, SLP c/s for dypshagia.  Rx per GI to assist with symptom management.  Will monitor need for additional nutrition interventions, diet education.     Malnutrition Assessment:  Malnutrition Status:  Moderate malnutrition (09/28/24 1303)    Context:  Chronic Illness     Findings of the 6 clinical characteristics of malnutrition:  Energy Intake:  75% or less estimated energy requirements for 1 month or longer (Poor PO for the last ~1.5-2 months; on going N/V)  Weight Loss:   (reports losing ~100# in the last year unintentionally; 40-50# recently per pt recall; states she was recently in 300s and now 282# per rough estimate bedscale wt today; note edema and hx/o CHF as well)     Body Fat Loss:  No significant body fat loss     Muscle Mass Loss:  No significant muscle mass loss    Fluid Accumulation:  Mild Generalized   Strength:  Not Performed    Nutrition Assessment:    Pt. moderately malnourished AEB criteria as listed above.  At risk for further nutrition compromise r/t admit with N/V ongoing for last month or two, hypokalemia 2/2 GI losses, UTI, pericolonic fluid collection - possible abscess - diverticulitis, suspected gastroparesis, hyperthyroidism, leukocytosis suspected d/t N/V, and underlying medical condition (PMHx: anemia, anxiety, CHF, COPD, T2DM, HTN, lupus, obesity, RIRI, OA, former smoker).     Nutrition Related Findings:    Pt. Report/Treatments/Miscellaneous:   9/30- pt. Seen - PN off as peripheral line infiltrated; s/p PICC line placement - awaiting confirmation of placement; reports +N/V, burping; denies passing any gas; plan for EGD today; SLP evaluation pending; s/p UGI 9/29-Oral contrast  did pass into proximal small bowel loops. Initially, there was some delayed passage through the distal esophagus and stomach  9/28-Pt seen - closing eyes throughout conversation, c/o N/V and inability for PO intake for the last 1.5-2 months. She states that she has gone down 5 pant sizes and feels that most recently her N/V resulted in 40-50# weight loss. Pt aware of plan for PN initiation d/t GI function at this time; pt declining PPN start until PICC placed - educated pt on the difference and pt still not in agreement until PICC placed. Encouraged pt to think about it.   GI Status: BM 9/29  Pertinent Labs: 9/30: Glucose 132, Potassium 3.3, BUN 22, Cr 0.3, 9/28: Magnesium 2.2, Phosphorus 3.7  Pertinent Meds: Phenergan, Compazine, Zofran, ATB, Reglan, Insulin      Wound Type: None       Current Nutrition Intake & Therapies:    Average Meal Intake: NPO     Diet NPO Exceptions are: Sips of Clear Liquids  Current Parenteral Nutrition Orders:  Type and Formula: Premix Peripheral (Clinimix 4.25/5)   Lipids: None  Duration: Continuous  Rate/Volume: 85 mL/hr for 24 hours  Current PN Order Provides: 694 kcals, 87 gm protein, 102 gm CHO/day  Goal PN Orders Provides: next bag will provide pt. with 1050 kcals, 105 gm protein 92.6 gm CHO, 31.5 gm lipids/day    Anthropometric Measures:  Height: 157.5 cm (5' 2\")  Ideal Body Weight (IBW): 110 lbs (50 kg)    Admission Body Weight: 130.6 kg (287 lb 14.7 oz) (9/26: stated weight)  Current Body Weight: 130.6 kg (288 lb) (9/26 stated weight, +1 edema),     Current BMI (kg/m2): 52.7  Usual Body Weight:  (per pt was 300# \"recently\"; per EMR: 1/29/19: 390# 10 oz, 6/3/23: 348# 2 oz - [facility])     Weight Adjustment For: No Adjustment                 BMI Categories: Obese Class 3 (BMI 40.0 or greater)    Estimated Daily Nutrient Needs:  Energy Requirements Based On: Kcal/kg  Weight Used for Energy Requirements: Current (130.6 kg)  Energy (kcal/day): ~8102-1658 kcals (9-11 kcals/kg)  Weight Used

## 2024-09-30 NOTE — PRE SEDATION
[facility]  Sedation/Analgesia History & Physical    Patient: Baylee Irizarry : 1955  Med Rec#: [id number] Acc#: [id number]   Provider Performing Procedure: Yadira Dumont MD  Primary Care Physician: Myriam Sun APRN - CNP    PRE-PROCEDURE   Full CODE [x]Yes  DNR-CCA/DNR-CC []Yes   Brief History/Pre-Procedure Diagnosis: Patient with abdominal discomfort as well as nausea vomiting thank you for EGD evaluate         MEDICAL HISTORY  []CAD/Valve  []Liver Disease  []Lung Disease []Diabetes  []Hypertension []Renal Disease  []Additional information:       has a past medical history of Anemia, Anxiety, Asthma, CHF (congestive heart failure) ([facility]), COPD (chronic obstructive pulmonary disease) ([facility]), CRP elevated, Depression, DM type 2 (diabetes mellitus, type 2) ([facility]), Headache(784.0), HTN (hypertension), Lupus ([facility]), Obesity, RIRI (obstructive sleep apnea), Osteoarthritis, and Positive KODAK (antinuclear antibody).    SURGICAL HISTORY   has a past surgical history that includes  section; Hysterectomy; and Foot surgery.  Additional information:       ALLERGIES   Allergies as of 2024 - Fully Reviewed 2024   Allergen Reaction Noted    Latex  2013    Symbicort [budesonide-formoterol fumarate] Shortness Of Breath and Swelling 2019    Cephalexin  2013    Keflex [cephalexin]  2015    Levofloxacin  2015    Percocet [oxycodone-acetaminophen] Other (See Comments) 2024     Additional information:       MEDICATIONS   Coumadin Use Last 7 Days [x]No []Yes  Antiplatelet drug therapy use last 7 days  [x]No []Yes  Other anticoagulant use last 7 days  [x]No []Yes    Current Facility-Administered Medications:     potassium phosphate 20 mmol in sodium chloride 0.9 % 250 mL IVPB for central line, 20 mmol, IntraVENous, Once, Cecile Cunningham, APRN - CNP, Last Rate: 62.5 mL/hr at 24 1331, 20 mmol at 24 1331    PN-Adult  3 IN 1

## 2024-09-30 NOTE — PROGRESS NOTES
Patient's IV infiltrated and she is refusing to have another IV placed. Patient states that she just wants to wait until the picc line is placed.

## 2024-09-30 NOTE — PLAN OF CARE
Problem: Chronic Conditions and Co-morbidities  Goal: Patient's chronic conditions and co-morbidity symptoms are monitored and maintained or improved  9/30/2024 0001 by Luersman, Kristen, RN  Outcome: Progressing  Flowsheets (Taken 9/30/2024 0001)  Care Plan - Patient's Chronic Conditions and Co-Morbidity Symptoms are Monitored and Maintained or Improved:   Monitor and assess patient's chronic conditions and comorbid symptoms for stability, deterioration, or improvement   Collaborate with multidisciplinary team to address chronic and comorbid conditions and prevent exacerbation or deterioration     Problem: Pain  Goal: Verbalizes/displays adequate comfort level or baseline comfort level  9/30/2024 0001 by Luersman, Kristen, RN  Outcome: Progressing  Flowsheets (Taken 9/30/2024 0001)  Verbalizes/displays adequate comfort level or baseline comfort level:   Encourage patient to monitor pain and request assistance   Assess pain using appropriate pain scale  Note: Patient denies pain at this time     Problem: Safety - Adult  Goal: Free from fall injury  9/30/2024 0001 by Luersman, Kristen, RN  Outcome: Progressing  Flowsheets (Taken 9/30/2024 0001)  Free From Fall Injury: Instruct family/caregiver on patient safety     Problem: Skin/Tissue Integrity  Goal: Absence of new skin breakdown  Description: 1.  Monitor for areas of redness and/or skin breakdown  2.  Assess vascular access sites hourly  3.  Every 4-6 hours minimum:  Change oxygen saturation probe site  4.  Every 4-6 hours:  If on nasal continuous positive airway pressure, respiratory therapy assess nares and determine need for appliance change or resting period.  9/30/2024 0001 by Luersman, Kristen, RN  Outcome: Progressing     Problem: Gastrointestinal - Adult  Goal: Minimal or absence of nausea and vomiting  9/30/2024 0001 by Luersman, Kristen, RN  Outcome: Progressing  Flowsheets (Taken 9/30/2024 0001)  Minimal or absence of nausea and vomiting:   Maintain

## 2024-10-01 ENCOUNTER — APPOINTMENT (OUTPATIENT)
Dept: CT IMAGING | Age: 69
DRG: 357 | End: 2024-10-01
Payer: MEDICARE

## 2024-10-01 LAB
ANION GAP SERPL CALC-SCNC: 9 MEQ/L (ref 8–16)
BACTERIA BLD AEROBE CULT: NORMAL
BACTERIA BLD AEROBE CULT: NORMAL
BASOPHILS ABSOLUTE: 0 THOU/MM3 (ref 0–0.1)
BASOPHILS NFR BLD AUTO: 0.3 %
BUN SERPL-MCNC: 21 MG/DL (ref 7–22)
CA-I BLD ISE-SCNC: 0.96 MMOL/L (ref 1.12–1.32)
CALCIUM SERPL-MCNC: 8.3 MG/DL (ref 8.5–10.5)
CHLORIDE SERPL-SCNC: 103 MEQ/L (ref 98–111)
CO2 SERPL-SCNC: 25 MEQ/L (ref 23–33)
CREAT SERPL-MCNC: 0.3 MG/DL (ref 0.4–1.2)
DEPRECATED RDW RBC AUTO: 42.3 FL (ref 35–45)
EOSINOPHIL NFR BLD AUTO: 0 %
EOSINOPHILS ABSOLUTE: 0 THOU/MM3 (ref 0–0.4)
ERYTHROCYTE [DISTWIDTH] IN BLOOD BY AUTOMATED COUNT: 14.5 % (ref 11.5–14.5)
GFR SERPL CREATININE-BSD FRML MDRD: > 90 ML/MIN/1.73M2
GLUCOSE BLD STRIP.AUTO-MCNC: 115 MG/DL (ref 70–108)
GLUCOSE BLD STRIP.AUTO-MCNC: 129 MG/DL (ref 70–108)
GLUCOSE BLD STRIP.AUTO-MCNC: 130 MG/DL (ref 70–108)
GLUCOSE BLD STRIP.AUTO-MCNC: 138 MG/DL (ref 70–108)
GLUCOSE SERPL-MCNC: 130 MG/DL (ref 70–108)
HCT VFR BLD AUTO: 30.5 % (ref 37–47)
HGB BLD-MCNC: 9.3 GM/DL (ref 12–16)
IMM GRANULOCYTES # BLD AUTO: 0.05 THOU/MM3 (ref 0–0.07)
IMM GRANULOCYTES NFR BLD AUTO: 0.5 %
LYMPHOCYTES ABSOLUTE: 1.9 THOU/MM3 (ref 1–4.8)
LYMPHOCYTES NFR BLD AUTO: 17.9 %
MAGNESIUM SERPL-MCNC: 1.8 MG/DL (ref 1.6–2.4)
MCH RBC QN AUTO: 24.8 PG (ref 26–33)
MCHC RBC AUTO-ENTMCNC: 30.5 GM/DL (ref 32.2–35.5)
MCV RBC AUTO: 81.3 FL (ref 81–99)
MONOCYTES ABSOLUTE: 1.2 THOU/MM3 (ref 0.4–1.3)
MONOCYTES NFR BLD AUTO: 11.5 %
NEUTROPHILS ABSOLUTE: 7.3 THOU/MM3 (ref 1.8–7.7)
NEUTROPHILS NFR BLD AUTO: 69.8 %
NRBC BLD AUTO-RTO: 0 /100 WBC
PHOSPHATE SERPL-MCNC: 3.2 MG/DL (ref 2.4–4.7)
PLATELET # BLD AUTO: 259 THOU/MM3 (ref 130–400)
PMV BLD AUTO: 13.7 FL (ref 9.4–12.4)
POTASSIUM SERPL-SCNC: 4.1 MEQ/L (ref 3.5–5.2)
RBC # BLD AUTO: 3.75 MILL/MM3 (ref 4.2–5.4)
REASON FOR REJECTION: NORMAL
REJECTED TEST: NORMAL
SODIUM SERPL-SCNC: 137 MEQ/L (ref 135–145)
WBC # BLD AUTO: 10.5 THOU/MM3 (ref 4.8–10.8)

## 2024-10-01 PROCEDURE — 2500000003 HC RX 250 WO HCPCS: Performed by: NURSE PRACTITIONER

## 2024-10-01 PROCEDURE — 85025 COMPLETE CBC W/AUTO DIFF WBC: CPT

## 2024-10-01 PROCEDURE — 84100 ASSAY OF PHOSPHORUS: CPT

## 2024-10-01 PROCEDURE — 6360000002 HC RX W HCPCS: Performed by: STUDENT IN AN ORGANIZED HEALTH CARE EDUCATION/TRAINING PROGRAM

## 2024-10-01 PROCEDURE — 6370000000 HC RX 637 (ALT 250 FOR IP): Performed by: STUDENT IN AN ORGANIZED HEALTH CARE EDUCATION/TRAINING PROGRAM

## 2024-10-01 PROCEDURE — 82330 ASSAY OF CALCIUM: CPT

## 2024-10-01 PROCEDURE — 72192 CT PELVIS W/O DYE: CPT

## 2024-10-01 PROCEDURE — 2580000003 HC RX 258: Performed by: STUDENT IN AN ORGANIZED HEALTH CARE EDUCATION/TRAINING PROGRAM

## 2024-10-01 PROCEDURE — 83735 ASSAY OF MAGNESIUM: CPT

## 2024-10-01 PROCEDURE — 2580000003 HC RX 258: Performed by: INTERNAL MEDICINE

## 2024-10-01 PROCEDURE — 99232 SBSQ HOSP IP/OBS MODERATE 35: CPT | Performed by: SURGERY

## 2024-10-01 PROCEDURE — 6360000002 HC RX W HCPCS: Performed by: NURSE PRACTITIONER

## 2024-10-01 PROCEDURE — 80048 BASIC METABOLIC PNL TOTAL CA: CPT

## 2024-10-01 PROCEDURE — 6370000000 HC RX 637 (ALT 250 FOR IP): Performed by: INTERNAL MEDICINE

## 2024-10-01 PROCEDURE — 2580000003 HC RX 258: Performed by: NURSE PRACTITIONER

## 2024-10-01 PROCEDURE — 6360000002 HC RX W HCPCS: Performed by: RADIOLOGY

## 2024-10-01 PROCEDURE — 82948 REAGENT STRIP/BLOOD GLUCOSE: CPT

## 2024-10-01 PROCEDURE — 6360000002 HC RX W HCPCS: Performed by: INTERNAL MEDICINE

## 2024-10-01 PROCEDURE — 36415 COLL VENOUS BLD VENIPUNCTURE: CPT

## 2024-10-01 PROCEDURE — 92610 EVALUATE SWALLOWING FUNCTION: CPT

## 2024-10-01 PROCEDURE — 99223 1ST HOSP IP/OBS HIGH 75: CPT | Performed by: STUDENT IN AN ORGANIZED HEALTH CARE EDUCATION/TRAINING PROGRAM

## 2024-10-01 PROCEDURE — 1200000003 HC TELEMETRY R&B

## 2024-10-01 PROCEDURE — 99233 SBSQ HOSP IP/OBS HIGH 50: CPT | Performed by: NURSE PRACTITIONER

## 2024-10-01 RX ORDER — FENTANYL CITRATE 50 UG/ML
INJECTION, SOLUTION INTRAMUSCULAR; INTRAVENOUS PRN
Status: COMPLETED | OUTPATIENT
Start: 2024-10-01 | End: 2024-10-01

## 2024-10-01 RX ORDER — CALCIUM GLUCONATE 20 MG/ML
2000 INJECTION, SOLUTION INTRAVENOUS ONCE
Status: COMPLETED | OUTPATIENT
Start: 2024-10-01 | End: 2024-10-01

## 2024-10-01 RX ORDER — MIDAZOLAM HYDROCHLORIDE 1 MG/ML
INJECTION, SOLUTION INTRAMUSCULAR; INTRAVENOUS PRN
Status: COMPLETED | OUTPATIENT
Start: 2024-10-01 | End: 2024-10-01

## 2024-10-01 RX ADMIN — PIPERACILLIN AND TAZOBACTAM 4500 MG: 4; .5 INJECTION, POWDER, FOR SOLUTION INTRAVENOUS at 16:39

## 2024-10-01 RX ADMIN — PIPERACILLIN AND TAZOBACTAM 4500 MG: 4; .5 INJECTION, POWDER, FOR SOLUTION INTRAVENOUS at 10:02

## 2024-10-01 RX ADMIN — ENOXAPARIN SODIUM 30 MG: 100 INJECTION SUBCUTANEOUS at 21:23

## 2024-10-01 RX ADMIN — PROCHLORPERAZINE EDISYLATE 10 MG: 5 INJECTION INTRAMUSCULAR; INTRAVENOUS at 21:23

## 2024-10-01 RX ADMIN — SUCRALFATE 1 G: 1 TABLET ORAL at 12:16

## 2024-10-01 RX ADMIN — CALCIUM GLUCONATE 2000 MG: 20 INJECTION, SOLUTION INTRAVENOUS at 12:28

## 2024-10-01 RX ADMIN — CALCIUM GLUCONATE: 98 INJECTION, SOLUTION INTRAVENOUS at 17:27

## 2024-10-01 RX ADMIN — PIPERACILLIN AND TAZOBACTAM 4500 MG: 4; .5 INJECTION, POWDER, FOR SOLUTION INTRAVENOUS at 01:09

## 2024-10-01 RX ADMIN — METOCLOPRAMIDE 10 MG: 5 INJECTION, SOLUTION INTRAMUSCULAR; INTRAVENOUS at 01:05

## 2024-10-01 RX ADMIN — METHIMAZOLE 10 MG: 10 TABLET ORAL at 09:58

## 2024-10-01 RX ADMIN — HYDRALAZINE HYDROCHLORIDE 10 MG: 20 INJECTION INTRAMUSCULAR; INTRAVENOUS at 04:16

## 2024-10-01 RX ADMIN — LISINOPRIL 40 MG: 40 TABLET ORAL at 09:52

## 2024-10-01 RX ADMIN — METOCLOPRAMIDE 10 MG: 5 INJECTION, SOLUTION INTRAMUSCULAR; INTRAVENOUS at 12:18

## 2024-10-01 RX ADMIN — METOCLOPRAMIDE 10 MG: 5 INJECTION, SOLUTION INTRAMUSCULAR; INTRAVENOUS at 23:32

## 2024-10-01 RX ADMIN — ENOXAPARIN SODIUM 30 MG: 100 INJECTION SUBCUTANEOUS at 09:59

## 2024-10-01 RX ADMIN — PANTOPRAZOLE SODIUM 40 MG: 40 INJECTION, POWDER, FOR SOLUTION INTRAVENOUS at 09:53

## 2024-10-01 RX ADMIN — METHIMAZOLE 10 MG: 10 TABLET ORAL at 13:49

## 2024-10-01 RX ADMIN — FENTANYL CITRATE 50 MCG: 50 INJECTION, SOLUTION INTRAMUSCULAR; INTRAVENOUS at 16:07

## 2024-10-01 RX ADMIN — METOCLOPRAMIDE 10 MG: 5 INJECTION, SOLUTION INTRAMUSCULAR; INTRAVENOUS at 17:23

## 2024-10-01 RX ADMIN — METOCLOPRAMIDE 10 MG: 5 INJECTION, SOLUTION INTRAMUSCULAR; INTRAVENOUS at 05:56

## 2024-10-01 RX ADMIN — MIDAZOLAM 1 MG: 1 INJECTION INTRAMUSCULAR; INTRAVENOUS at 16:07

## 2024-10-01 ASSESSMENT — PAIN DESCRIPTION - LOCATION: LOCATION: OTHER (COMMENT)

## 2024-10-01 ASSESSMENT — PAIN SCALES - GENERAL
PAINLEVEL_OUTOF10: 0
PAINLEVEL_OUTOF10: 4

## 2024-10-01 NOTE — PROGRESS NOTES
TRANSFER - OUT REPORT:    Verbal report given to Bruna RN(name) on Baylee Irizarry being transferred to (unit) for routine progression of patient care       Report consisted of patient's Situation, Background, Assessment and   Recommendations(SBAR).     Information from the following report(s) Nurse Handoff Report and MAR was reviewed with the receiving nurse.    Opportunity for questions and clarification was provided.      Patient transported with:   Monitor

## 2024-10-01 NOTE — CONSULTS
fatigue.  Head: no head ache , no head injury, no migranes.  Eye: no blurring of vision, no double vision.  Ears: no hearing difficulty, no tinnitus  Mouth/throat: no ulceration, dental caries , dysphagia  Lungs: no cough, no shortness of breath, no wheeze  CVS: no palpitation, no chest pain, no shortness of breath  GI: no abdominal pain, no nausea , no vomiting, no constipation  [place]RA[place]: no dysuria, frequency and urgency, no hematuria, no kidney stones  Musculoskeletal: no joint pain, swelling , stiffness,  Endocrine: no polyuria, polydipsia, no cold or heat intolerance  Hematology: no anemia, no easy brusing or bleeding, no hx of clotting disorder  Dermatology: no skin rash, no eczema, no pruritis,  Psychiatry: no depression, no anxiety,no panic attacks, no suicide ideation    PHYSICAL EXAM:     BP (!) 158/68   Pulse (!) 105   Temp 97.9 °F (36.6 °C) (Oral)   Resp 20   Ht 1.575 m (5' 2\")   Wt 130.1 kg (286 lb 14.4 oz)   SpO2 95%   BMI 52.47 kg/m²   General:  Awake, alert, not in distress.  HEENT: pink conjunctiva, unicteric sclera, moist oral mucosa.  Chest:  bilateral air entry, Clear to auscultation,   Cardiovascular:  RRR ,S1S2, no murmur or gallop.  Abdomen:  Soft, non tender to palpation.  Extremities: no edema  Skin:  Warm and dry.  CNS: aox3        LABS:     CBC:   Recent Labs     09/29/24  0531 09/30/24  0352 10/01/24  0600   WBC 11.4* 11.0* 10.5   HGB 10.3* 9.7* 9.3*    259 259     BMP:    Recent Labs     09/29/24  0531 09/29/24  1429 09/30/24  0352 10/01/24  0735     --  133* 137   K 3.3* 3.7 3.3* 4.1     --  102 103   CO2 22*  --  23 25   BUN 20  --  22 21   CREATININE 0.3*  --  0.3* 0.3*   GLUCOSE 147*  --  132* 130*     Calcium:  Recent Labs     10/01/24  0735   CALCIUM 8.3*     Ionized Calcium:Invalid input(s): \"IONCA\"  Magnesium:  Recent Labs     10/01/24  0735   MG 1.8     Phosphorus:  Recent Labs     10/01/24  0735   PHOS 3.2     BNP:No results for input(s): \"BNP\" in the last

## 2024-10-01 NOTE — PROGRESS NOTES
1534 Patient received in CT scanning for aspiration of fluid collection of intra abdominal abscess. Monitor applied.   1556 Dr. Snow here; spoke to patient. This procedure has been fully reviewed with the patient and written informed consent has been obtained.   1600 Patient assisted to CT table and pre scan started.   1613 After reviewing CT scan, Dr. Snow decided he cannot do the procedure safely.    1619 Patient on bed; comfort ensured.  1620patient taken to 5K via bed/transport. Pt alert and oriented x3; follows commands. Skin pink, warm, and dry. Respirations easy, regular, and nonlabored.

## 2024-10-01 NOTE — PROGRESS NOTES
[facility]– [place] 2[place]  SPEECH THERAPY  STRZ ONC MED 5K  Clinical Swallow Evaluation    Discharge Recommendations: Home with Home Exercise Program and Continue to Assess Pending Progress  DIET ORDER RECOMMENDATIONS AFTER EVALUATION: Regular Diet with Thin Liquids (Advance As Preferred by Patient Given Nausea)  Strategies:   Standard Universal Swallowing Precautions      SLP Individual Minutes  Time In: 0937  Time Out: 0945  Minutes: 8  Timed Code Treatment Minutes: 0 Minutes       Date: 10/1/2024  Patient Name: Baylee Irizarry      CSN: [id number]   : 1955  (69 y.o.)  Gender: female   Referring Physician:  Mariza Gibson APRN - CNP  Diagnosis: Diverticulitis    History of Present Illness/Injury: Patient admitted to [facility] with above diagnosis; please see physician H&P for full report. Per chart review, \"Baylee Irizarry is a 69 y.o. female with PMHx of Anemia, Anxiety, Asthma, CHF, COPD, MDD, DM2, HA, Lupus, HTN, Obesity, RIRI who presents to [facility] ED with CC:1 month emesis and difficulty eating. On  given potassium at [facility], PCP advised her to come in. C/o abdominal soreness. Pain /10,   [facility] said thyroid problem. Denied fever no chills, feels cold. Denies urinary complaints,   On interview today patient states that she has been unable to keep much down for the past month.  She says pretty much all she can take in is 7 up.  She has been unable to take medications.  As such presented with BP significantly elevated.  Endorses nausea, vomiting, says she is always cold but denies fever or chills, she has no urinary complaints but does endorse that she has had frequent UTIs in the last 6 months.  Patient goes to [facility] for most care.  Denies CP but does endorse palpitations which happen occasionally.  Nausea and vomiting is her biggest complaint at this time.\"    ST consulted to further evaluate oropharyngeal swallow integrity with implementation of goals/POC as clinically  Education: Verbalizes understanding and Family not present    PLAN:  Skilled SLP intervention on acute care 1-3 x per week or until goals met and or patient plateaus in function.  Specific interventions for next session may include: dietary analysis    PATIENT GOAL:    Return to prior level of function.    SHORT TERM GOALS:  Short Term Goals  Time Frame for Short Term Goals: 2 weeks  Goal 1: Patient will consume regular diet with thin liquids (as clinically/medically indicated) with stable pulmonary status and incorporation of trained compensatory strategies to assist with nutrition/hydration measures.  Goal 2: Considerations for an instrumental evaluation should adverse changes be conveyed in direct relation to the safety/efficency of the swallow mechanism.    LONG TERM GOALS:  No LTGs established due to short ELOS.      Mike Mcneill M.S., CCC-SLP [id number]

## 2024-10-01 NOTE — PROGRESS NOTES
Pharmacy Consult for TPN/PPN Monitoring & Adjustment  IV Access: central      Dosing weight: 70 kg  Current insulin regimen: low sliding scale  Diet (excluding TPN): Clear liquid diet  Maintenance fluid: none    Plan:  See components of tonight's TPN bag in the table below:  Date 09/30/24 10/1/24   Total kcal/kg 15 15   Total kcal 1050 1050   Protein g/kg 1.5 1.5   Protein g 105 105   Protein kcal (4 kcal/g) 420 420   Lipid % 30 30   Lipid g 31.5 31.5   Lipid kcal (10 kcal/g) 315 315   Dextrose g 92.6 92.6   Dextrose kcal (3.4 kcal/g) 315 315   Infusion Rate (mL/hr) 90 90   Na Acetate (mEq) 100 100   Na Chloride (mEq) 100 100   Na Phos (mmol)  (3 mmol = 4 mEq Na) 0 0   Total Na (mEq) 200 200   K Acetate (mEq) 40 40   K Chloride (mEq) 40 40   K Phos (mmol)  (15 mmol = 22 mEq K) 25 25   Total K (mEq) 116.67 116.67   Ca Gluconate (mEq)  (1 g = 4.65 mEq) 0 4.65   Mag Sulfate (mEq)  (1 g = 8.12 mEq) 16.24 16.24   Multivitamin (mL) 10 10   Trace Elements (mL) 1 1       Summary of changes for tonight's TPN:   No Macronutrient changes per dietary  Add calcium gluconate 1g (4.65 mEq)    Electrolyte Replacement:   Magnesium sulfate: 2 g  potassium phosphate: 20 mmol  Calcium gluconate: 2 g     Monitoring:  BMP, Mag, iCal, & Phos ordered for tomorrow with AM labs.    Pharmacy will continue to follow daily. Thank you for the consult.  Julianne Ruano, PharmD 10/1/2024 9:19 AM

## 2024-10-01 NOTE — PROGRESS NOTES
10/01/24 1205   Encounter Summary   Encounter Overview/Reason Spiritual/Emotional Needs   Service Provided For Patient   Referral/Consult From Rounding   Support System Children   Last Encounter  10/01/24   Complexity of Encounter Moderate   Begin Time 1157   End Time  1205   Total Time Calculated 8 min   Spiritual/Emotional needs   Type Spiritual Support   Assessment/Intervention/Outcome   Assessment Coping   Intervention Nurtured Hope;Prayer (assurance of)/[place];Sustaining Presence/Ministry of presence   Outcome Comfort     Assessment:  In my encounter with the 69 yr old patient, while rounding  the unit 5k,  I provided spiritual care to patient through conversation, I also came to assess the patient's spiritual needs present.     Interventions:  I provided prayer, emotional support and words of comfort.  provided a listening presence and encouraged pt to share their beliefs and how I can support them during their hospitalization.     Outcomes:  The patient was encouraged and didn't share any further spiritual needs at this time.     Plan:  Chaplains will follow-up at a later time for assessment of any spiritual care needs present.

## 2024-10-01 NOTE — PROGRESS NOTES
Gastroenterology  Progress Note    10/1/2024 5:15 PM  Subjective:   Admit Date: 9/26/2024    Interval History: Patient presented with diverticulitis nausea and vomits has doctors plan to conservative therapy plan to start PPI twice daily and upper endoscopy diagnostic on Monday.    9/29/2024 clinically improved patient for EGD tomorrow    10/1/2024 discussed the finding with the EGD no GI bleed seen no biopsy obtained nausea vomit improved patient able to eat advise a diet elective colonoscopy after discharge for evaluation continued antibiotic therapy.    Diet: PN-Adult  3 IN 1 Central Line (Custom)  ADULT DIET; Clear Liquid  ADULT ORAL NUTRITION SUPPLEMENT; Breakfast, Lunch, Dinner; Clear Liquid Oral Supplement  PN-Adult  3 IN 1 Central Line (Custom)    Medications:   Scheduled Meds:    sucralfate  1 g Oral 4x Daily AC & HS    pantoprazole (PROTONIX) 40 mg in sodium chloride (PF) 0.9 % 10 mL injection  40 mg IntraVENous Daily    piperacillin-tazobactam  4,500 mg IntraVENous q8h    metoclopramide  10 mg IntraVENous Q6H    sodium chloride flush  5-40 mL IntraVENous 2 times per day    diclofenac sodium  2 g Topical BID    hydroxychloroquine  200 mg Oral Daily    lisinopril  40 mg Oral Daily    montelukast  10 mg Oral Nightly    methIMAzole  10 mg Oral TID    enoxaparin  30 mg SubCUTAneous BID    insulin lispro  0-4 Units SubCUTAneous Q6H     Continuous Infusions:    PN-Adult  3 IN 1 Central Line (Custom)      PN-Adult  3 IN 1 Central Line (Custom) 90 mL/hr at 09/30/24 1805    sodium chloride Stopped (09/30/24 0558)    dextrose         CBC:   Recent Labs     09/29/24  0531 09/30/24  0352 10/01/24  0600   WBC 11.4* 11.0* 10.5   HGB 10.3* 9.7* 9.3*    259 259     BMP:    Recent Labs     09/29/24  0531 09/29/24  1429 09/30/24  0352 10/01/24  0735     --  133* 137   K 3.3* 3.7 3.3* 4.1     --  102 103   CO2 22*  --  23 25   BUN 20  --  22 21   CREATININE 0.3*  --  0.3* 0.3*   GLUCOSE 147*  --  132*  soft, non-tender; bowel sounds normal; no masses,  no organomegaly  Extremities: extremities normal, atraumatic, no cyanosis or edema    Assessment and Plan:   Acute diverticulitis on antibiotic therapy  Nausea vomiting with peptic ulcer disease start PPI  EGD scheduled on Monday  Morbidly obese      Follow up in GI Clinic after discharge in 3 week(s)    Patient Active Problem List:     DM type 2 (diabetes mellitus, type 2) (HCC)     HTN (hypertension)     Microcytic anemia     Severe persistent asthma     Diverticulitis     Hyperthyroidism     Morbid obesity     Diverticulitis of large intestine with abscess     Moderate malnutrition (HCC)      Electronically signed by Yadira Dumont MD on 10/1/2024 at 5:15 PM

## 2024-10-01 NOTE — PROGRESS NOTES
Comprehensive Nutrition Assessment    Type and Reason for Visit:  Reassess (TPN Monitor)    Nutrition Recommendations/Plan:   Recommend no changes in tonight's 3-in-1 TPN bag. Continue 70 kgm dosing wt; 15 kcal/kgm, 1.5 gm protein/kgm and 30% lipid kcals from lipids.  Diet per Provider and SLP.  Started Ensure Clear TID.  Rx per GI to assist with symptom management.  Will monitor need for additional nutrition interventions, diet education.     Malnutrition Assessment:  Malnutrition Status:  Moderate malnutrition (09/28/24 1303)    Context:  Chronic Illness     Findings of the 6 clinical characteristics of malnutrition:  Energy Intake:  75% or less estimated energy requirements for 1 month or longer (Poor PO for the last ~1.5-2 months; on going N/V)  Weight Loss:   (reports losing ~100# in the last year unintentionally; 40-50# recently per pt recall; states she was recently in 300s and now 282# per rough estimate bedscale wt today; note edema and hx/o CHF as well)     Body Fat Loss:  No significant body fat loss     Muscle Mass Loss:  No significant muscle mass loss    Fluid Accumulation:  Mild Generalized   Strength:  Not Performed    Nutrition Assessment: Pt improving from a nutritional standpoint AEB pt report of tolerating Clear Liquid diet and continue on 3-in-1 TPN. Remains at risk for further nutritional compromise r/t admit with N/V ongoing for last month or two, hypokalemia 2/2 GI losses, UTI, pericolonic fluid collection - possible abscess - diverticulitis, suspected gastroparesis, hyperthyroidism, leukocytosis suspected d/t N/V, and underlying medical condition (PMHx: anemia, anxiety, CHF, COPD, T2DM, HTN, lupus, obesity, RIRI, OA, former smoker).    Nutrition Related Findings:    Pt. Report/Treatments/Miscellaneous:   10/1- Pt seen this morning during breakfast. Pt tolerating sips and bites of broth and Jello and is tolerating it well. Pt reports ongoing nausea and emesis with lots of flatus today. Pt

## 2024-10-01 NOTE — PROGRESS NOTES
10.5 today. Afebrile.  T2DM: BS trend in goal. Accu-Cheks qid. Hypoglycemia protocol Low-dose SSI.  Asthma: Continue albuterol nebulizer q6h prn  RIRI: Continue home CPAP  Lupus: Continue home Plaquenil 200 qd.  Morbid obesity. BMI 52.47.      Chief Complaint: Fatigue and emesis     Hospital Course:     Baylee Irizarry is a 69 y.o. female with PMHx of Anemia, Anxiety, Asthma, CHF, COPD, MDD, DM2, HA, Lupus, HTN, Obesity, RIRI who presents to [facility] ED with CC:1 month emesis and difficulty eating. On 9/23 given potassium at [facility], PCP advised her to come in. C/o abdominal soreness. Pain 7/10,   [facility] said thyroid problem. Denied fever no chills, feels cold. Denies urinary complaints,   On interview today patient states that she has been unable to keep much down for the past month.  She says pretty much all she can take in is 7 up.  She has been unable to take medications.  As such presented with BP significantly elevated.  Endorses nausea, vomiting, says she is always cold but denies fever or chills, she has no urinary complaints but does endorse that she has had frequent UTIs in the last 6 months.  Patient goes to [facility] for most care.  Denies CP but does endorse palpitations which happen occasionally.  Nausea and vomiting is her biggest complaint at this time.     ED course: T 98 RR 18 HR 85 /68 SpO2 98 on RA.   Labs: BMP glucose 139 LFT Alb 2.7  AST 41 Bili 1.3  TSH 0.005  WBC 12.2 Hgb 11.6 Plt 245 Neuts 9.5     Urine Many bacteria.   CT showed bladder wall thickening perivesicular fat stranding. GI Colonic diverticulitis w/ pericolonic fat stranding. Also ill defined collection of fluid.        Subjective: Agreeable to aspirate the fluid collection. Unsure if she will be able to lay in the position needed. But willing to try. Continues with nausea, vomiting. No Abdominal pain.      Medications:  Reviewed    Infusion Medications    PN-Adult  3 IN 1 Central Line (Custom)      PN-Adult  3 IN 1 Central Line  colonic neoplasm and close clinical and imaging follow-up to ensure   resolution is advised. The peridiverticular fluid collections have increased in   size now measuring up to 29 mm superior to the sigmoid colon (previously   measured 23 mm). A peripherally enhancing midline fluid collection within the   pelvis now measures 27 x 41 mm (previously measured up to 22 mm). No bowel   obstruction is identified. No free air is visualized. No extravasation of the   oral contrast material is observed. Contrast material is seen to the level of   the rectosigmoid colon.      2. Chronic findings are discussed.            **This report has been created using voice recognition software.  It may contain   minor errors which are inherent in voice recognition technology.**      Electronically signed by Dr. Kenia Sesay      XR CHEST PORTABLE   Final Result   PICC line tip in the SVC. No pulmonary infiltrates.               **This report has been created using voice recognition software. It may contain   minor errors which are inherent in voice recognition technology.**      Electronically signed by Dr. Nivia Knight      FL UGI   Final Result      Oral contrast did pass into proximal small bowel loops. Initially, there was   some delayed passage through the distal esophagus and stomach.         **This report has been created using voice recognition software. It may contain   minor errors which are inherent in voice recognition technology.**         Electronically signed by Dr. Nivia Knight      XR CHEST (2 VW)   Final Result   Impression:   Chronic bronchitis versus viral bronchiolitis subtly suggested in the    upper lobes predominantly see above for discussion.      This document has been electronically signed by: Eduardo Pedroza III, MD on    09/27/2024 03:20 AM      CT ABDOMEN PELVIS W IV CONTRAST Additional Contrast? None   Final Result   1. Colonic diverticulosis with pericolonic fat stranding adjacent to the mid

## 2024-10-01 NOTE — PLAN OF CARE
Problem: Chronic Conditions and Co-morbidities  Goal: Patient's chronic conditions and co-morbidity symptoms are monitored and maintained or improved  10/1/2024 1045 by Bruna Bo RN  Outcome: Progressing   Patient's chronic conditions and co-morbidity symptoms are monitored and maintained.     Problem: Pain  Goal: Verbalizes/displays adequate comfort level or baseline comfort level  10/1/2024 1045 by Bruna Bo, RN  Outcome: Progressing  Patient states pain relief from PRN pain medications. Pain reassessed one hour post PRN pain medication given.  Patient rates pain 3 on MINOO 0-10 scale.     Problem: Safety - Adult  Goal: Free from fall injury  10/1/2024 1045 by Bruna Bo RN  Outcome: Progressing  Fall assessment completed. Patient using call light appropriately to call for assistance with ambulation to bathroom.  Personal items within reach. Patient is also compliant with use of non-skid slippers.      Problem: Skin/Tissue Integrity  Goal: Absence of new skin breakdown  Description: 1.  Monitor for areas of redness and/or skin breakdown  2.  Assess vascular access sites hourly  3.  Every 4-6 hours minimum:  Change oxygen saturation probe site  4.  Every 4-6 hours:  If on nasal continuous positive airway pressure, respiratory therapy assess nares and determine need for appliance change or resting period.  10/1/2024 1045 by Bruna Bo RN  Outcome: Progressing  No skin breakdown this shift. Patient being assisted with turning. Patients states understanding of repositioning every two hours.     Problem: Gastrointestinal - Adult  Goal: Minimal or absence of nausea and vomiting  10/1/2024 1045 by Bruna Bo, RN  Outcome: Progressing  Nausea at times. Patient taking compazine and zofran for nausea control.     Problem: Gastrointestinal - Adult  Goal: Maintains or returns to baseline bowel function  10/1/2024 1045 by Bruna Bo RN  Outcome:  Progressing  Patient bowel sounds active.  Passing flatus.  Pt taking prescribed medication to assist with BM.     Problem: Discharge Planning  Goal: Discharge to home or other facility with appropriate resources  10/1/2024 1045 by Bruna Bo, RN  Outcome: Progressing  Discharge plan is in process. Plan discharge home with family.     Problem: Nutrition Deficit:  Goal: Optimize nutritional status  10/1/2024 1045 by Bruna Bo, RN  Outcome: Progressing  Flowsheets (Taken 10/1/2024 1005 by Suki Sotelo, MS, RD, LD)  Nutrient intake appropriate for improving, restoring, or maintaining nutritional needs:   Assess nutritional status and recommend course of action   Monitor oral intake, labs, and treatment plans   Recommend, monitor, and adjust tube feedings and TPN/PPN based on assessed needs    Patient tolerating clear liquids diet. C/o nausea at times.     Care plan reviewed with patient and family. Patient and family verbalized understanding.

## 2024-10-01 NOTE — PROGRESS NOTES
[facility] GENERAL SURGERY  General Surgery Postoperative Progress Note    Pt Name: Baylee Irizarry  Medical Record Number: [id number]  Date of Birth 1955   Today's Date: 10/1/2024    CC:  Chief Complaint   Patient presents with    Fatigue    Emesis       ASSESSMENT   HD # 5  Continues to have nausea and vomiting workup in progress  EGD showed gastritis, additional meds ordered  Repeat CT reviewed, No perforation, no contrast leak, fluid collections persist 1 now 4.1 cm other too small to drain, but clinically she has a stable WBC, no fever, bowels moved, and is a poor surgical risk for leak based on nutritional status Sh would need a resection with colostomy for risk of leak  Preoperative Nutrition Screen (WOLF)   Patient's Age: 69 y.o.    Last Serum Albumin Level: 2.3  No results found for: \"LABALBU\"  Patient's BMI: Estimated body mass index is 52.68 kg/m² as calculated from the following:    Height as of this encounter: 1.575 m (5' 2\").    Weight as of this encounter: 130.6 kg (288 lb).     If the answer to any of the following is Yes, then recommend prescribe Oral Nutrition Supplements (ONS) for at least 7 days prior to surgery and/or order referral to dietitian for further assessment and nutrition therapy.    1. Does the patient have a documented serum albumin less than 3.0 within the last 90 days?  Yes = 1        2. Is patient's BMI less than 18.5 (or less than 20 if age over 65)?  No = 0       3. Has the patient had an unplanned weight loss of 10% of body weight or more in the last 6 months? Yes = 1   4. Has the patient been eating less than 50% of their normal diet in the preceding week? Yes = 1   WOLF Score (number of Yes responses), 0-4 3   Electronically signed by Shun Mejia MD on 10/1/24 at 6:13 AM EDT    She has had a prior cholecystectomy even though is not listed as a prior surgery  White count is 11 k last 3 days and having BM's  Recheck CT today to eval fluid  the sigmoid colon measure up to 23 mm in diameter (series 301, image 61) suspicious for peridiverticular abscess. The fat stranding surrounding the sigmoid colon also surrounds the urinary bladder and the urinary bladder wall appears thickened which could indicate infectious/inflammatory cystitis secondary to diverticulitis. Correlate with urinalysis. A somewhat elongated collection of fluid in the midline (series 301, image 68) may represent a second peridiverticular abscess. It appears to extend along the post hysterectomy portion of the pelvis. Continued follow-up to ensure resolution is advised. 2. Chronic findings are discussed. **This report has been created using voice recognition software.  It may contain minor errors which are inherent in voice recognition technology.** Electronically signed by Dr. Kenia Sesay    Electronically signed by Shun Mejia MD on 10/1/2024 at 6:10 AM

## 2024-10-01 NOTE — PROCEDURES
[address] [address]                             PROCEDURE NOTE      PATIENT NAME: LETY GATYAN          : 1955  MED REC NO: [id number]                       ROOM: [facility]  ACCOUNT NO: [id number]                       ADMIT DATE: 2024  PROVIDER: Yadira Dumont MD      DATE OF PROCEDURE:  2024    SURGEON:  Yadira Dumont MD    INDICATIONS:  The patient with nausea, vomiting, abdominal discomfort; diverticulitis, currently on antibiotic.  She is morbidly obese.  Plan today is for EGD to evaluate.    ASA CLASSIFICATION:  III.    ESTIMATED BLOOD LOSS:  None.    DESCRIPTION OF PROCEDURE:  The patient was brought to GI lab.  Consent obtained.  Risks involved with the procedure explained to the patient.  Informed consent was obtained.  The patient was monitored during procedure with pulse oximetry, blood pressure monitoring, oxygen by nasal cannula.  Sedation by incremental doses of IV propofol given by Anesthesia Service to achieve monitored anesthesia care.  For ASA classification and medication given during procedure, please see Anesthesia note.    Procedure performed is EGD.    The patient was put in left decubitus position.  Mouthpiece was put to keep the oral cavity open.  Upper scope was advanced with no difficulty up to the duodenum.  Esophagus appeared normal.  No erosions.  No definite strictures seen.  Mild gastritis with significant seen in distal part of the body.  The antrum has small inflammatory polyp.  No polypectomy performed.  The patient high risk to bleed at this time.  Duodenum appeared normal.  The scope was withdrawn with no immediate complications.    IMPRESSION:    1. Normal esophagus.  2. Gastritis because of inflammatory polyp seen in the stomach , not taken at this time.  The patient had  is high risk to bleed .    PLAN:    1. Resume diet, advance as tolerated.  2. Add Carafate to the

## 2024-10-01 NOTE — PLAN OF CARE
Problem: Chronic Conditions and Co-morbidities  Goal: Patient's chronic conditions and co-morbidity symptoms are monitored and maintained or improved  Outcome: Progressing  Flowsheets (Taken 10/1/2024 0447)  Care Plan - Patient's Chronic Conditions and Co-Morbidity Symptoms are Monitored and Maintained or Improved: Monitor and assess patient's chronic conditions and comorbid symptoms for stability, deterioration, or improvement     Problem: Pain  Goal: Verbalizes/displays adequate comfort level or baseline comfort level  Outcome: Progressing  Flowsheets (Taken 10/1/2024 0447)  Verbalizes/displays adequate comfort level or baseline comfort level:   Encourage patient to monitor pain and request assistance   Assess pain using appropriate pain scale   Administer analgesics based on type and severity of pain and evaluate response     Problem: Safety - Adult  Goal: Free from fall injury  Outcome: Progressing  Flowsheets (Taken 10/1/2024 0447)  Free From Fall Injury: Instruct family/caregiver on patient safety  Note: Patient bed alarm is on at this time.     Problem: Skin/Tissue Integrity  Goal: Absence of new skin breakdown  Description: 1.  Monitor for areas of redness and/or skin breakdown  2.  Assess vascular access sites hourly  3.  Every 4-6 hours minimum:  Change oxygen saturation probe site  4.  Every 4-6 hours:  If on nasal continuous positive airway pressure, respiratory therapy assess nares and determine need for appliance change or resting period.  Outcome: Progressing  Note: Patient is able to reposition self.     Problem: Gastrointestinal - Adult  Goal: Minimal or absence of nausea and vomiting  Outcome: Progressing  Flowsheets (Taken 10/1/2024 0447)  Minimal or absence of nausea and vomiting:   Administer IV fluids as ordered to ensure adequate hydration   Maintain NPO status until nausea and vomiting are resolved  Goal: Maintains or returns to baseline bowel function  Outcome: Progressing  Flowsheets  (Taken 10/1/2024 0447)  Maintains or returns to baseline bowel function:   Assess bowel function   Encourage oral fluids to ensure adequate hydration     Problem: Discharge Planning  Goal: Discharge to home or other facility with appropriate resources  Outcome: Progressing  Flowsheets (Taken 10/1/2024 0447)  Discharge to home or other facility with appropriate resources:   Identify barriers to discharge with patient and caregiver   Identify discharge learning needs (meds, wound care, etc)     Problem: Nutrition Deficit:  Goal: Optimize nutritional status  Outcome: Progressing  Flowsheets (Taken 10/1/2024 0447)  Nutrient intake appropriate for improving, restoring, or maintaining nutritional needs:   Assess nutritional status and recommend course of action   Monitor oral intake, labs, and treatment plans     Care plan reviewed with patient.  Patient verbalizes understanding of the plan of care and contributes to goal setting.

## 2024-10-02 LAB
ANION GAP SERPL CALC-SCNC: 10 MEQ/L (ref 8–16)
BUN SERPL-MCNC: 25 MG/DL (ref 7–22)
CA-I BLD ISE-SCNC: 1.22 MMOL/L (ref 1.12–1.32)
CALCIUM SERPL-MCNC: 8.6 MG/DL (ref 8.5–10.5)
CHLORIDE SERPL-SCNC: 104 MEQ/L (ref 98–111)
CO2 SERPL-SCNC: 25 MEQ/L (ref 23–33)
CREAT SERPL-MCNC: 0.4 MG/DL (ref 0.4–1.2)
DEPRECATED RDW RBC AUTO: 39.8 FL (ref 35–45)
ERYTHROCYTE [DISTWIDTH] IN BLOOD BY AUTOMATED COUNT: 14.3 % (ref 11.5–14.5)
GFR SERPL CREATININE-BSD FRML MDRD: > 90 ML/MIN/1.73M2
GLUCOSE BLD STRIP.AUTO-MCNC: 117 MG/DL (ref 70–108)
GLUCOSE BLD STRIP.AUTO-MCNC: 135 MG/DL (ref 70–108)
GLUCOSE BLD STRIP.AUTO-MCNC: 136 MG/DL (ref 70–108)
GLUCOSE SERPL-MCNC: 135 MG/DL (ref 70–108)
HCT VFR BLD AUTO: 30 % (ref 37–47)
HEMOCCULT STL QL: NEGATIVE
HGB BLD-MCNC: 9.2 GM/DL (ref 12–16)
MAGNESIUM SERPL-MCNC: 1.8 MG/DL (ref 1.6–2.4)
MCH RBC QN AUTO: 23.8 PG (ref 26–33)
MCHC RBC AUTO-ENTMCNC: 30.7 GM/DL (ref 32.2–35.5)
MCV RBC AUTO: 77.5 FL (ref 81–99)
PHOSPHATE SERPL-MCNC: 3.3 MG/DL (ref 2.4–4.7)
PLATELET # BLD AUTO: 259 THOU/MM3 (ref 130–400)
PMV BLD AUTO: 13.2 FL (ref 9.4–12.4)
POTASSIUM SERPL-SCNC: 4.4 MEQ/L (ref 3.5–5.2)
RBC # BLD AUTO: 3.87 MILL/MM3 (ref 4.2–5.4)
SODIUM SERPL-SCNC: 139 MEQ/L (ref 135–145)
WBC # BLD AUTO: 13.4 THOU/MM3 (ref 4.8–10.8)

## 2024-10-02 PROCEDURE — 99232 SBSQ HOSP IP/OBS MODERATE 35: CPT | Performed by: SURGERY

## 2024-10-02 PROCEDURE — 6370000000 HC RX 637 (ALT 250 FOR IP): Performed by: INTERNAL MEDICINE

## 2024-10-02 PROCEDURE — 80048 BASIC METABOLIC PNL TOTAL CA: CPT

## 2024-10-02 PROCEDURE — 6360000002 HC RX W HCPCS: Performed by: STUDENT IN AN ORGANIZED HEALTH CARE EDUCATION/TRAINING PROGRAM

## 2024-10-02 PROCEDURE — 6360000002 HC RX W HCPCS: Performed by: NURSE PRACTITIONER

## 2024-10-02 PROCEDURE — 82330 ASSAY OF CALCIUM: CPT

## 2024-10-02 PROCEDURE — 6370000000 HC RX 637 (ALT 250 FOR IP): Performed by: STUDENT IN AN ORGANIZED HEALTH CARE EDUCATION/TRAINING PROGRAM

## 2024-10-02 PROCEDURE — 84100 ASSAY OF PHOSPHORUS: CPT

## 2024-10-02 PROCEDURE — 85027 COMPLETE CBC AUTOMATED: CPT

## 2024-10-02 PROCEDURE — 99233 SBSQ HOSP IP/OBS HIGH 50: CPT | Performed by: STUDENT IN AN ORGANIZED HEALTH CARE EDUCATION/TRAINING PROGRAM

## 2024-10-02 PROCEDURE — 36415 COLL VENOUS BLD VENIPUNCTURE: CPT

## 2024-10-02 PROCEDURE — 99233 SBSQ HOSP IP/OBS HIGH 50: CPT | Performed by: PHYSICIAN ASSISTANT

## 2024-10-02 PROCEDURE — 83735 ASSAY OF MAGNESIUM: CPT

## 2024-10-02 PROCEDURE — 2580000003 HC RX 258: Performed by: STUDENT IN AN ORGANIZED HEALTH CARE EDUCATION/TRAINING PROGRAM

## 2024-10-02 PROCEDURE — 2500000003 HC RX 250 WO HCPCS: Performed by: NURSE PRACTITIONER

## 2024-10-02 PROCEDURE — 82948 REAGENT STRIP/BLOOD GLUCOSE: CPT

## 2024-10-02 PROCEDURE — 2580000003 HC RX 258: Performed by: NURSE PRACTITIONER

## 2024-10-02 PROCEDURE — 1200000003 HC TELEMETRY R&B

## 2024-10-02 PROCEDURE — 82272 OCCULT BLD FECES 1-3 TESTS: CPT

## 2024-10-02 RX ADMIN — METOCLOPRAMIDE 10 MG: 5 INJECTION, SOLUTION INTRAMUSCULAR; INTRAVENOUS at 06:00

## 2024-10-02 RX ADMIN — SUCRALFATE 1 G: 1 TABLET ORAL at 18:14

## 2024-10-02 RX ADMIN — METHIMAZOLE 10 MG: 10 TABLET ORAL at 14:56

## 2024-10-02 RX ADMIN — METHIMAZOLE 10 MG: 10 TABLET ORAL at 08:30

## 2024-10-02 RX ADMIN — PIPERACILLIN AND TAZOBACTAM 4500 MG: 4; .5 INJECTION, POWDER, FOR SOLUTION INTRAVENOUS at 18:24

## 2024-10-02 RX ADMIN — METOCLOPRAMIDE 10 MG: 5 INJECTION, SOLUTION INTRAMUSCULAR; INTRAVENOUS at 14:43

## 2024-10-02 RX ADMIN — METOCLOPRAMIDE 10 MG: 5 INJECTION, SOLUTION INTRAMUSCULAR; INTRAVENOUS at 18:14

## 2024-10-02 RX ADMIN — PIPERACILLIN AND TAZOBACTAM 4500 MG: 4; .5 INJECTION, POWDER, FOR SOLUTION INTRAVENOUS at 10:01

## 2024-10-02 RX ADMIN — CALCIUM GLUCONATE: 98 INJECTION, SOLUTION INTRAVENOUS at 18:25

## 2024-10-02 RX ADMIN — SODIUM CHLORIDE, PRESERVATIVE FREE 10 ML: 5 INJECTION INTRAVENOUS at 09:52

## 2024-10-02 RX ADMIN — PIPERACILLIN AND TAZOBACTAM 4500 MG: 4; .5 INJECTION, POWDER, FOR SOLUTION INTRAVENOUS at 01:16

## 2024-10-02 NOTE — PROGRESS NOTES
Comprehensive Nutrition Assessment    Type and Reason for Visit:  Reassess    Nutrition Recommendations/Plan:   Recommend no TPN macronutrient changes for next bag.  Recommend advance diet as GI function allows.  Continue w/ Ensure Clear TID - discussed changing to Glucerna ONS once FL diet or greater.  Recommend continue PN until tolerance of FL diet or greater.  Will monitor need for additional nutrition interventions, diet education.     Malnutrition Assessment:  Malnutrition Status:  Moderate malnutrition (09/28/24 1303)    Context:  Chronic Illness     Findings of the 6 clinical characteristics of malnutrition:  Energy Intake:  75% or less estimated energy requirements for 1 month or longer (Poor PO for the last ~1.5-2 months; on going N/V)  Weight Loss:   (reports losing ~100# in the last year unintentionally; 40-50# recently per pt recall; states she was recently in 300s and now 282# per rough estimate bedscale wt today; note edema and hx/o CHF as well)     Body Fat Loss:  No significant body fat loss     Muscle Mass Loss:  No significant muscle mass loss    Fluid Accumulation:  Mild Generalized   Strength:  Not Performed    Nutrition Assessment:     Remains at risk for further nutritional compromise r/t admit with N/V ongoing for last month or two, hypokalemia 2/2 GI losses, UTI, pericolonic fluid collection - possible abscess - diverticulitis, suspected gastroparesis, hyperthyroidism, leukocytosis suspected d/t N/V, and underlying medical condition (PMHx: anemia, anxiety, CHF, COPD, T2DM, HTN, lupus, obesity, RIRI, OA, former smoker).     Nutrition Related Findings:    Pt. Report/Treatments/Miscellaneous:   10/2- pt. Seen - reports her belly is \"quieting down\"; denies any nausea or abdominal pain; CL diet is staying down; reports passing gas; states acceptance of Ensure Clear; agrees to trial Glucerna ONS once FL diet; discussed PN w/ pt; provided pt. With warm blankets as reports feeling cold  10/1- Pt

## 2024-10-02 NOTE — PROGRESS NOTES
[facility] Infectious Disease         Progress Note      Baylee Irizarry  MEDICAL RECORD NUMBER:  [id number]  AGE: 69 y.o.   GENDER: female  : 1955  EPISODE DATE:  2024      Assessment:     Pt is a 69yof who I am consulted for intra-abdominal abscess. Currently on therapy with pip/tazo.      The fluid collections in the abdomen near mid line pelvic as well as mid sigmoid have both increased in size. The sigmoid collection is fairly small and the location of the midline collection is fairly close in proximity to bladder. They both may be difficult to access. While on empiric therapy, the size has increased which is concerning for 3 possibilities. 1. The collection is not being penetrated by antibiotics (most likely). 2. The organisms present in the collection are discordant (resistant) to antibiotic coverage (alternative may be ceftriaxone or FQ paired with clarke). The issue should not be coverage in my opinion as this regimen should adequately cover most of the enterobacteriaceae and anaerobes that would be involved.      I would consider having intervention radiology evaluate for possible drainage of the mid line collection as this will be both therapeutic and diagnostic. I would continue pip/tazo for now though an alternative may be the regimens listed above. Increasing abscess size to 4cm reduces the likelihood of antibiotic success as monotherapy. Repeat imaging in 4-5 days.      - Continue pip/tazo  - Alternative may be CTX w/ clarke or FQ w/ clarke   - I do have some hesitance with increasing abscess size  - On chart review, documented allergies to quinolone and keflex (tolerating zosyn)   - Repeat imaging in 4-5 days (recommend 10/5 CT a/p w/ con)  - Unable to obtain IR drainage as procedure could not be performed safely      Subjective:     Chief Complaint   Patient presents with    Fatigue    Emesis         Pt resting comfortably in bed. She has no complaints or concerns this morning other

## 2024-10-02 NOTE — PROGRESS NOTES
Gastroenterology  Progress Note    10/2/2024 6:09 PM  Subjective:   Admit Date: 9/26/2024    Interval History: Patient presented with diverticulitis nausea and vomits has doctors plan to conservative therapy plan to start PPI twice daily and upper endoscopy diagnostic on Monday.    9/29/2024 clinically improved patient for EGD tomorrow    [id number] discussed the finding with the EGD no GI bleed seen no biopsy obtained nausea vomit improved patient able to eat advise a diet elective colonoscopy after discharge for evaluation continued antibiotic therapy.  Patient more alert oriented diet to advance as tolerated.  Likely will need elective EGD and colonoscopy as an outpatient    Diet: ADULT ORAL NUTRITION SUPPLEMENT; Breakfast, Lunch, Dinner; Clear Liquid Oral Supplement  PN-Adult  3 IN 1 Central Line (Custom)  ADULT DIET; Full Liquid; Low Fat (less than or equal to 50 gm/day)  ADULT ORAL NUTRITION SUPPLEMENT; Breakfast, Lunch, Dinner; Standard High Calorie/High Protein Oral Supplement    Medications:   Scheduled Meds:    sucralfate  1 g Oral 4x Daily AC & HS    pantoprazole (PROTONIX) 40 mg in sodium chloride (PF) 0.9 % 10 mL injection  40 mg IntraVENous Daily    piperacillin-tazobactam  4,500 mg IntraVENous q8h    metoclopramide  10 mg IntraVENous Q6H    sodium chloride flush  5-40 mL IntraVENous 2 times per day    diclofenac sodium  2 g Topical BID    hydroxychloroquine  200 mg Oral Daily    lisinopril  40 mg Oral Daily    montelukast  10 mg Oral Nightly    methIMAzole  10 mg Oral TID    enoxaparin  30 mg SubCUTAneous BID    insulin lispro  0-4 Units SubCUTAneous Q6H     Continuous Infusions:    PN-Adult  3 IN 1 Central Line (Custom)      sodium chloride 25 mL/hr at 10/02/24 0958    dextrose         CBC:   Recent Labs     09/30/24  0352 10/01/24  0600 10/02/24  1430   WBC 11.0* 10.5 13.4*   HGB 9.7* 9.3* 9.2*    259 259     BMP:    Recent Labs     09/30/24  0352 10/01/24  0735 10/02/24  0600   * 137  with exam  Lungs: clear to auscultation bilaterally  Heart: regular rate and rhythm, S1, S2 normal, no murmur, click, rub or gallop  Abdomen: soft, non-tender; bowel sounds normal; no masses,  no organomegaly  Extremities: extremities normal, atraumatic, no cyanosis or edema    Assessment and Plan:   Acute diverticulitis on antibiotic therapy  Nausea vomiting with peptic ulcer disease start PPI  EGD scheduled on Monday  Morbidly obese      Follow up in GI Clinic after discharge in 3 week(s)    Patient Active Problem List:     DM type 2 (diabetes mellitus, type 2) (HCC)     HTN (hypertension)     Microcytic anemia     Severe persistent asthma     Diverticulitis     Hyperthyroidism     Morbid obesity     Diverticulitis of large intestine with abscess     Moderate malnutrition (HCC)      Electronically signed by Yadira Dumont MD on 10/2/2024 at 6:09 PM

## 2024-10-02 NOTE — PROGRESS NOTES
10/02/24 0944   Encounter Summary   Encounter Overview/Reason Follow-up   Service Provided For Patient   Referral/Consult From Rounding   Support System Children   Last Encounter  10/02/24   Complexity of Encounter Moderate   Begin Time 0935   End Time  0944   Total Time Calculated 9 min   Spiritual/Emotional needs   Type Spiritual Support   Assessment/Intervention/Outcome   Assessment Coping   Intervention Active listening;Nurtured Hope;Prayer (assurance of)/[place];Sustaining Presence/Ministry of presence   Outcome Coping     Assessment:  In my follow up encounter with the 69 yr old patient, while rounding the unit 5K,  I provided spiritual care to patient through conversation, I also came to assess the patient's spiritual needs present. The pt was admitted due to diverticulitis.     Interventions:  I provided prayer, emotional support and words of comfort.  provided a listening presence and encouraged pt to share their beliefs and how I can support them during their hospitalization.     Outcomes:  The patient was encouraged and didn't share any further spiritual needs at this time.     Plan:  Chaplains will follow-up at a later time for assessment of any spiritual care needs present.

## 2024-10-02 NOTE — PROGRESS NOTES
Hospitalist Progress Note      Patient:  Baylee Irizarry 69 y.o. female     Unit/Bed:5K-11/011-A    Date of Admission: 9/26/2024      ASSESSMENT AND PLAN    Active Problems  Pericolonic fluid collection, possible abscess, diverticulitis: Irregular fluid collection, <3 cm seen on CT, elevated WBC 12.2, hemoglobin, endorses nausea and vomiting and abdominal pain on admission.   Repeat CT 9/30 with one fluid collection that is 4.1 cm.   IR was asked to drain abscess, it was deemed unsafe.   General Surgery & ID recommend repeat CT scan 10/5.   Continue IV Zosyn.   Intractable n/v x 4 weeks. UGI 9/29 - contrast did pass. GI following. EGD with gastritis in the body and antrum with few inflammatory polyps, no biopsy was obtained.  Continue Reglan started 9/28. PRN rectal phenergan, IV Zofran / Compazine PRN. On IV PPI. Carafate has been started.  Moderate malnutrition. PICC placed and TPN started.   General Surgery following, case discussed with service & their notes reviewed, appreciate recommendations.    They are okay with advancing to FLD and to see if patient tolerates. The ultimate goal is a low residue diet.   UTI(poa): Urine showed many bacteria and was orange. Culture E coli, sensitive to current treatment. Continue above Zosyn.  Hyperthyroidism, uncontrolled. Unable to keep orals down. TSH 0.005, free T4 3.42, free T3 5.4 Continue Tapazole 10 mg tid. Endocrinology consulted by general surgery. We currently do not have any endocrine coverage.   Resolved Problems  Dysphagia. SLP seen and cleared for regular diet with thin liquid when she is able to tolerate.  Hypokalemia due to GI loss, resolved with replacement. Mag noted to be 1.8.  Accelerated Hypertension, improved. Hx HTN: Lisinopril restarted. BP controlled.   Hyponatremia, resolved.  Leukocytosis:Likely due to n/v, resolved. 10.5 today. Afebrile.  Chronic Conditions (reviewed and stable unless otherwise stated)  Microcytic anemia. Hbg 9.3. CBC

## 2024-10-02 NOTE — PLAN OF CARE
Problem: Chronic Conditions and Co-morbidities  Goal: Patient's chronic conditions and co-morbidity symptoms are monitored and maintained or improved  Outcome: Progressing  Flowsheets (Taken 10/2/2024 0323)  Care Plan - Patient's Chronic Conditions and Co-Morbidity Symptoms are Monitored and Maintained or Improved: Monitor and assess patient's chronic conditions and comorbid symptoms for stability, deterioration, or improvement     Problem: Pain  Goal: Verbalizes/displays adequate comfort level or baseline comfort level  Outcome: Progressing  Flowsheets (Taken 10/2/2024 0323)  Verbalizes/displays adequate comfort level or baseline comfort level:   Encourage patient to monitor pain and request assistance   Assess pain using appropriate pain scale   Administer analgesics based on type and severity of pain and evaluate response     Problem: Safety - Adult  Goal: Free from fall injury  Outcome: Progressing  Flowsheets (Taken 10/2/2024 0323)  Free From Fall Injury: Instruct family/caregiver on patient safety  Note:  Patient bed alarm is on at this time.     Problem: Skin/Tissue Integrity  Goal: Absence of new skin breakdown  Description: 1.  Monitor for areas of redness and/or skin breakdown  2.  Assess vascular access sites hourly  3.  Every 4-6 hours minimum:  Change oxygen saturation probe site  4.  Every 4-6 hours:  If on nasal continuous positive airway pressure, respiratory therapy assess nares and determine need for appliance change or resting period.  Outcome: Progressing  Note: Patient is able to reposition self.     Problem: Gastrointestinal - Adult  Goal: Minimal or absence of nausea and vomiting  Outcome: Progressing  Flowsheets (Taken 10/2/2024 0323)  Minimal or absence of nausea and vomiting:   Administer IV fluids as ordered to ensure adequate hydration   Maintain NPO status until nausea and vomiting are resolved  Goal: Maintains or returns to baseline bowel function  Outcome: Progressing  Flowsheets

## 2024-10-02 NOTE — PROGRESS NOTES
[facility] GENERAL SURGERY  General Surgery Postoperative Progress Note    Pt Name: Baylee Irizarry  Medical Record Number: [id number]  Date of Birth 1955   Today's Date: 10/2/2024    CC:  Chief Complaint   Patient presents with    Fatigue    Emesis       ASSESSMENT   HD # 6  P.o. intake improved she actually ate some things yesterday and they did not come back up  EGD showed gastritis, additional meds ordered  Repeat CT reviewed, No perforation, no contrast leak, fluid collections persist 1 now 4.1 cm other too small to drain, but clinically she has a stable WBC, no fever, bowels moved, and is a poor surgical risk for leak based on nutritional status She would need a resection with colostomy for risk of leak.  An option would be if she does not respond to IV antibiotics is laparoscopic washout of the fluid collections with drain placement if clinically she becomes ill.  Currently her white count is normal she has no fevers she is not septic her initial blood cultures are negative I find no urgent need to have to take this patient to surgery.    5.  Someone ordered IR drainage of the pelvic abscess which to my review when I saw did not believe this was drainable by IR but she went down to IR yesterday and it was not drainable.  At this point as my note said yesterday she is doing okay with current treatment, I did not want to treat an x-ray lets repeat imaging in a few days to see what happens.  During this time they can work on her diet and nutritional status      Preoperative Nutrition Screen (WOLF)   Patient's Age: 69 y.o.    Last Serum Albumin Level: 2.3  No results found for: \"LABALBU\"  Patient's BMI: Estimated body mass index is 52.47 kg/m² as calculated from the following:    Height as of this encounter: 1.575 m (5' 2\").    Weight as of this encounter: 130.1 kg (286 lb 14.4 oz).     If the answer to any of the following is Yes, then recommend prescribe Oral Nutrition Supplements  abscess. It appears to extend along the post hysterectomy portion of the pelvis. Continued follow-up to ensure resolution is advised. 2. Chronic findings are discussed. **This report has been created using voice recognition software.  It may contain minor errors which are inherent in voice recognition technology.** Electronically signed by Dr. Kenia Sesay    Electronically signed by Shun Mejia MD on 10/2/2024 at 6:32 AM

## 2024-10-02 NOTE — PROGRESS NOTES
Pharmacy Consult for TPN/PPN Monitoring & Adjustment  IV Access: peripheral      Dosing weight: 70 kg  Current insulin regimen: low sliding scale  Diet (excluding TPN): Clear liquid diet  Maintenance fluid: none    Plan:  See components of tonight's TPN bag in the table below:  Date 09/30/24 10/1/24 10/2/24   Total kcal/kg 15 15 15   Total kcal 1050 1050 1050   Protein g/kg 1.5 1.5 1.5   Protein g 105 105 105   Protein kcal (4 kcal/g) 420 420 420   Lipid % 30 30 30   Lipid g 31.5 31.5 31.5   Lipid kcal (10 kcal/g) 315 315 315   Dextrose g 92.6 92.6 92.6   Dextrose kcal (3.4 kcal/g) 315 315 315   Infusion Rate (mL/hr) 90 90 90   Na Acetate (mEq) 100 100 100   Na Chloride (mEq) 100 100 100   Na Phos (mmol)  (3 mmol = 4 mEq Na) 0 0 0   Total Na (mEq) 200 200 200   K Acetate (mEq) 40 40 40   K Chloride (mEq) 40 40 40   K Phos (mmol)  (15 mmol = 22 mEq K) 25 25 25   Total K (mEq) 116.67 116.67 116.67   Ca Gluconate (mEq)  (1 g = 4.65 mEq) 0 4.65 4.65   Mag Sulfate (mEq)  (1 g = 8.12 mEq) 16.24 16.24 16.24   Multivitamin (mL) 10 10 10   Trace Elements (mL) 1 1 1     Summary of changes for tonight's TPN:   No micro/macronutrient changes    Electrolyte Replacement:   Calcium gluconate: 2 g    Monitoring:  BMP, Mag, iCal, & Phos ordered for tomorrow with AM labs.    Pharmacy will continue to follow daily. Thank you for the consult.  Julianne Ruano, PharmD 10/2/2024 8:01 AM

## 2024-10-03 ENCOUNTER — ANESTHESIA (OUTPATIENT)
Dept: OPERATING ROOM | Age: 69
End: 2024-10-03
Payer: MEDICARE

## 2024-10-03 ENCOUNTER — ANESTHESIA EVENT (OUTPATIENT)
Dept: OPERATING ROOM | Age: 69
End: 2024-10-03
Payer: MEDICARE

## 2024-10-03 PROBLEM — N73.9 PELVIC ABSCESS IN FEMALE: Status: ACTIVE | Noted: 2024-10-03

## 2024-10-03 LAB
ANION GAP SERPL CALC-SCNC: 10 MEQ/L (ref 8–16)
BASOPHILS ABSOLUTE: 0 THOU/MM3 (ref 0–0.1)
BASOPHILS NFR BLD AUTO: 0.1 %
BUN SERPL-MCNC: 21 MG/DL (ref 7–22)
CA-I BLD ISE-SCNC: 1.19 MMOL/L (ref 1.12–1.32)
CALCIUM SERPL-MCNC: 8.5 MG/DL (ref 8.5–10.5)
CHLORIDE SERPL-SCNC: 100 MEQ/L (ref 98–111)
CO2 SERPL-SCNC: 27 MEQ/L (ref 23–33)
CREAT SERPL-MCNC: 0.3 MG/DL (ref 0.4–1.2)
DEPRECATED RDW RBC AUTO: 40.3 FL (ref 35–45)
EOSINOPHIL NFR BLD AUTO: 0 %
EOSINOPHILS ABSOLUTE: 0 THOU/MM3 (ref 0–0.4)
ERYTHROCYTE [DISTWIDTH] IN BLOOD BY AUTOMATED COUNT: 14.2 % (ref 11.5–14.5)
GFR SERPL CREATININE-BSD FRML MDRD: > 90 ML/MIN/1.73M2
GLUCOSE BLD STRIP.AUTO-MCNC: 111 MG/DL (ref 70–108)
GLUCOSE BLD STRIP.AUTO-MCNC: 113 MG/DL (ref 70–108)
GLUCOSE BLD STRIP.AUTO-MCNC: 166 MG/DL (ref 70–108)
GLUCOSE SERPL-MCNC: 116 MG/DL (ref 70–108)
HCT VFR BLD AUTO: 32.6 % (ref 37–47)
HGB BLD-MCNC: 9.7 GM/DL (ref 12–16)
IMM GRANULOCYTES # BLD AUTO: 0.17 THOU/MM3 (ref 0–0.07)
IMM GRANULOCYTES NFR BLD AUTO: 1.3 %
LYMPHOCYTES ABSOLUTE: 2.4 THOU/MM3 (ref 1–4.8)
LYMPHOCYTES NFR BLD AUTO: 18 %
MAGNESIUM SERPL-MCNC: 1.9 MG/DL (ref 1.6–2.4)
MCH RBC QN AUTO: 23.5 PG (ref 26–33)
MCHC RBC AUTO-ENTMCNC: 29.8 GM/DL (ref 32.2–35.5)
MCV RBC AUTO: 79.1 FL (ref 81–99)
MONOCYTES ABSOLUTE: 1.3 THOU/MM3 (ref 0.4–1.3)
MONOCYTES NFR BLD AUTO: 9.8 %
NEUTROPHILS ABSOLUTE: 9.6 THOU/MM3 (ref 1.8–7.7)
NEUTROPHILS NFR BLD AUTO: 70.8 %
NRBC BLD AUTO-RTO: 0 /100 WBC
PHOSPHATE SERPL-MCNC: 3.4 MG/DL (ref 2.4–4.7)
PLATELET # BLD AUTO: 304 THOU/MM3 (ref 130–400)
PMV BLD AUTO: 13.5 FL (ref 9.4–12.4)
POTASSIUM SERPL-SCNC: 5.2 MEQ/L (ref 3.5–5.2)
RBC # BLD AUTO: 4.12 MILL/MM3 (ref 4.2–5.4)
SODIUM SERPL-SCNC: 137 MEQ/L (ref 135–145)
WBC # BLD AUTO: 13.5 THOU/MM3 (ref 4.8–10.8)

## 2024-10-03 PROCEDURE — 97162 PT EVAL MOD COMPLEX 30 MIN: CPT

## 2024-10-03 PROCEDURE — 2500000003 HC RX 250 WO HCPCS: Performed by: SURGERY

## 2024-10-03 PROCEDURE — 8E0W4CZ ROBOTIC ASSISTED PROCEDURE OF TRUNK REGION, PERCUTANEOUS ENDOSCOPIC APPROACH: ICD-10-PCS | Performed by: INTERNAL MEDICINE

## 2024-10-03 PROCEDURE — 6360000002 HC RX W HCPCS: Performed by: SURGERY

## 2024-10-03 PROCEDURE — 83735 ASSAY OF MAGNESIUM: CPT

## 2024-10-03 PROCEDURE — 97116 GAIT TRAINING THERAPY: CPT

## 2024-10-03 PROCEDURE — 6360000002 HC RX W HCPCS: Performed by: INTERNAL MEDICINE

## 2024-10-03 PROCEDURE — 97530 THERAPEUTIC ACTIVITIES: CPT

## 2024-10-03 PROCEDURE — 49020 DRAINAGE ABDOM ABSCESS OPEN: CPT | Performed by: SURGERY

## 2024-10-03 PROCEDURE — S2900 ROBOTIC SURGICAL SYSTEM: HCPCS | Performed by: SURGERY

## 2024-10-03 PROCEDURE — 99232 SBSQ HOSP IP/OBS MODERATE 35: CPT | Performed by: PHYSICIAN ASSISTANT

## 2024-10-03 PROCEDURE — 85025 COMPLETE CBC W/AUTO DIFF WBC: CPT

## 2024-10-03 PROCEDURE — 3700000001 HC ADD 15 MINUTES (ANESTHESIA): Performed by: SURGERY

## 2024-10-03 PROCEDURE — 80048 BASIC METABOLIC PNL TOTAL CA: CPT

## 2024-10-03 PROCEDURE — 84100 ASSAY OF PHOSPHORUS: CPT

## 2024-10-03 PROCEDURE — 6360000002 HC RX W HCPCS

## 2024-10-03 PROCEDURE — 2580000003 HC RX 258: Performed by: STUDENT IN AN ORGANIZED HEALTH CARE EDUCATION/TRAINING PROGRAM

## 2024-10-03 PROCEDURE — 7100000000 HC PACU RECOVERY - FIRST 15 MIN: Performed by: SURGERY

## 2024-10-03 PROCEDURE — 6370000000 HC RX 637 (ALT 250 FOR IP): Performed by: SURGERY

## 2024-10-03 PROCEDURE — 3600000019 HC SURGERY ROBOT ADDTL 15MIN: Performed by: SURGERY

## 2024-10-03 PROCEDURE — 99232 SBSQ HOSP IP/OBS MODERATE 35: CPT | Performed by: SURGERY

## 2024-10-03 PROCEDURE — 7100000001 HC PACU RECOVERY - ADDTL 15 MIN: Performed by: SURGERY

## 2024-10-03 PROCEDURE — 49329 UNLSTD LAPS PX ABD PERTM&OMN: CPT | Performed by: SURGERY

## 2024-10-03 PROCEDURE — 2500000003 HC RX 250 WO HCPCS: Performed by: NURSE ANESTHETIST, CERTIFIED REGISTERED

## 2024-10-03 PROCEDURE — 2580000003 HC RX 258: Performed by: NURSE ANESTHETIST, CERTIFIED REGISTERED

## 2024-10-03 PROCEDURE — 3600000009 HC SURGERY ROBOT BASE: Performed by: SURGERY

## 2024-10-03 PROCEDURE — 99233 SBSQ HOSP IP/OBS HIGH 50: CPT | Performed by: STUDENT IN AN ORGANIZED HEALTH CARE EDUCATION/TRAINING PROGRAM

## 2024-10-03 PROCEDURE — 0W9J0ZZ DRAINAGE OF PELVIC CAVITY, OPEN APPROACH: ICD-10-PCS | Performed by: INTERNAL MEDICINE

## 2024-10-03 PROCEDURE — 6360000002 HC RX W HCPCS: Performed by: NURSE ANESTHETIST, CERTIFIED REGISTERED

## 2024-10-03 PROCEDURE — 36415 COLL VENOUS BLD VENIPUNCTURE: CPT

## 2024-10-03 PROCEDURE — 6360000002 HC RX W HCPCS: Performed by: PHYSICIAN ASSISTANT

## 2024-10-03 PROCEDURE — 6360000002 HC RX W HCPCS: Performed by: STUDENT IN AN ORGANIZED HEALTH CARE EDUCATION/TRAINING PROGRAM

## 2024-10-03 PROCEDURE — 82948 REAGENT STRIP/BLOOD GLUCOSE: CPT

## 2024-10-03 PROCEDURE — 82330 ASSAY OF CALCIUM: CPT

## 2024-10-03 PROCEDURE — 2580000003 HC RX 258: Performed by: INTERNAL MEDICINE

## 2024-10-03 PROCEDURE — 6360000002 HC RX W HCPCS: Performed by: NURSE PRACTITIONER

## 2024-10-03 PROCEDURE — 2709999900 HC NON-CHARGEABLE SUPPLY: Performed by: SURGERY

## 2024-10-03 PROCEDURE — 6370000000 HC RX 637 (ALT 250 FOR IP): Performed by: INTERNAL MEDICINE

## 2024-10-03 PROCEDURE — 2580000003 HC RX 258: Performed by: SURGERY

## 2024-10-03 PROCEDURE — 1200000000 HC SEMI PRIVATE

## 2024-10-03 PROCEDURE — 49322 LAPAROSCOPY ASPIRATION: CPT | Performed by: SURGERY

## 2024-10-03 PROCEDURE — 3700000000 HC ANESTHESIA ATTENDED CARE: Performed by: SURGERY

## 2024-10-03 PROCEDURE — 2720000010 HC SURG SUPPLY STERILE: Performed by: SURGERY

## 2024-10-03 RX ORDER — ROCURONIUM BROMIDE 10 MG/ML
INJECTION, SOLUTION INTRAVENOUS
Status: DISCONTINUED | OUTPATIENT
Start: 2024-10-03 | End: 2024-10-03 | Stop reason: SDUPTHER

## 2024-10-03 RX ORDER — FENTANYL CITRATE 50 UG/ML
INJECTION, SOLUTION INTRAMUSCULAR; INTRAVENOUS
Status: DISCONTINUED | OUTPATIENT
Start: 2024-10-03 | End: 2024-10-03 | Stop reason: SDUPTHER

## 2024-10-03 RX ORDER — SODIUM CHLORIDE 9 MG/ML
INJECTION, SOLUTION INTRAVENOUS
Status: DISCONTINUED | OUTPATIENT
Start: 2024-10-03 | End: 2024-10-03 | Stop reason: SDUPTHER

## 2024-10-03 RX ORDER — TRIAMCINOLONE ACETONIDE 1 MG/G
CREAM TOPICAL 2 TIMES DAILY
Status: DISCONTINUED | OUTPATIENT
Start: 2024-10-03 | End: 2024-10-08 | Stop reason: HOSPADM

## 2024-10-03 RX ORDER — BUPIVACAINE HYDROCHLORIDE 5 MG/ML
INJECTION, SOLUTION EPIDURAL; INTRACAUDAL PRN
Status: DISCONTINUED | OUTPATIENT
Start: 2024-10-03 | End: 2024-10-03 | Stop reason: ALTCHOICE

## 2024-10-03 RX ORDER — FENTANYL CITRATE 50 UG/ML
INJECTION, SOLUTION INTRAMUSCULAR; INTRAVENOUS
Status: COMPLETED
Start: 2024-10-03 | End: 2024-10-03

## 2024-10-03 RX ORDER — LABETALOL HYDROCHLORIDE 5 MG/ML
INJECTION, SOLUTION INTRAVENOUS
Status: DISPENSED
Start: 2024-10-03 | End: 2024-10-04

## 2024-10-03 RX ORDER — PROPOFOL 10 MG/ML
INJECTION, EMULSION INTRAVENOUS
Status: DISCONTINUED | OUTPATIENT
Start: 2024-10-03 | End: 2024-10-03 | Stop reason: SDUPTHER

## 2024-10-03 RX ORDER — FENTANYL CITRATE 50 UG/ML
50 INJECTION, SOLUTION INTRAMUSCULAR; INTRAVENOUS EVERY 5 MIN PRN
Status: COMPLETED | OUTPATIENT
Start: 2024-10-03 | End: 2024-10-03

## 2024-10-03 RX ORDER — ONDANSETRON 2 MG/ML
INJECTION INTRAMUSCULAR; INTRAVENOUS
Status: DISCONTINUED | OUTPATIENT
Start: 2024-10-03 | End: 2024-10-03 | Stop reason: SDUPTHER

## 2024-10-03 RX ORDER — DEXAMETHASONE SODIUM PHOSPHATE 10 MG/ML
INJECTION, EMULSION INTRAMUSCULAR; INTRAVENOUS
Status: DISCONTINUED | OUTPATIENT
Start: 2024-10-03 | End: 2024-10-03 | Stop reason: SDUPTHER

## 2024-10-03 RX ORDER — LIDOCAINE HCL/PF 100 MG/5ML
SYRINGE (ML) INJECTION
Status: DISCONTINUED | OUTPATIENT
Start: 2024-10-03 | End: 2024-10-03 | Stop reason: SDUPTHER

## 2024-10-03 RX ORDER — LABETALOL HYDROCHLORIDE 5 MG/ML
INJECTION, SOLUTION INTRAVENOUS
Status: DISCONTINUED | OUTPATIENT
Start: 2024-10-03 | End: 2024-10-03 | Stop reason: SDUPTHER

## 2024-10-03 RX ADMIN — METHIMAZOLE 10 MG: 10 TABLET ORAL at 21:21

## 2024-10-03 RX ADMIN — HYDROMORPHONE HYDROCHLORIDE 0.5 MG: 1 INJECTION, SOLUTION INTRAMUSCULAR; INTRAVENOUS; SUBCUTANEOUS at 16:40

## 2024-10-03 RX ADMIN — PROPOFOL 150 MG: 10 INJECTION, EMULSION INTRAVENOUS at 12:34

## 2024-10-03 RX ADMIN — HYDROMORPHONE HYDROCHLORIDE 0.5 MG: 1 INJECTION, SOLUTION INTRAMUSCULAR; INTRAVENOUS; SUBCUTANEOUS at 20:43

## 2024-10-03 RX ADMIN — METOCLOPRAMIDE 10 MG: 5 INJECTION, SOLUTION INTRAMUSCULAR; INTRAVENOUS at 17:46

## 2024-10-03 RX ADMIN — FENTANYL CITRATE 50 MCG: 50 INJECTION, SOLUTION INTRAMUSCULAR; INTRAVENOUS at 13:55

## 2024-10-03 RX ADMIN — SODIUM CHLORIDE: 9 INJECTION, SOLUTION INTRAVENOUS at 12:27

## 2024-10-03 RX ADMIN — ENOXAPARIN SODIUM 30 MG: 100 INJECTION SUBCUTANEOUS at 20:43

## 2024-10-03 RX ADMIN — HYDROMORPHONE HYDROCHLORIDE 0.5 MG: 1 INJECTION, SOLUTION INTRAMUSCULAR; INTRAVENOUS; SUBCUTANEOUS at 13:46

## 2024-10-03 RX ADMIN — Medication 60 MG: at 12:34

## 2024-10-03 RX ADMIN — FENTANYL CITRATE 100 MCG: 50 INJECTION INTRAMUSCULAR; INTRAVENOUS at 12:34

## 2024-10-03 RX ADMIN — FENTANYL CITRATE 100 MCG: 50 INJECTION INTRAMUSCULAR; INTRAVENOUS at 12:39

## 2024-10-03 RX ADMIN — HYDRALAZINE HYDROCHLORIDE 10 MG: 20 INJECTION INTRAMUSCULAR; INTRAVENOUS at 16:39

## 2024-10-03 RX ADMIN — ONDANSETRON 4 MG: 2 INJECTION INTRAMUSCULAR; INTRAVENOUS at 13:21

## 2024-10-03 RX ADMIN — PIPERACILLIN AND TAZOBACTAM 4500 MG: 4; .5 INJECTION, POWDER, FOR SOLUTION INTRAVENOUS at 20:34

## 2024-10-03 RX ADMIN — DEXAMETHASONE SODIUM PHOSPHATE 5 MG: 10 INJECTION, EMULSION INTRAMUSCULAR; INTRAVENOUS at 12:38

## 2024-10-03 RX ADMIN — PIPERACILLIN AND TAZOBACTAM 4500 MG: 4; .5 INJECTION, POWDER, FOR SOLUTION INTRAVENOUS at 12:43

## 2024-10-03 RX ADMIN — CALCIUM GLUCONATE: 98 INJECTION, SOLUTION INTRAVENOUS at 18:28

## 2024-10-03 RX ADMIN — HYDROMORPHONE HYDROCHLORIDE 0.5 MG: 1 INJECTION, SOLUTION INTRAMUSCULAR; INTRAVENOUS; SUBCUTANEOUS at 13:41

## 2024-10-03 RX ADMIN — ROCURONIUM BROMIDE 50 MG: 10 INJECTION INTRAVENOUS at 12:34

## 2024-10-03 RX ADMIN — ROCURONIUM BROMIDE 20 MG: 10 INJECTION INTRAVENOUS at 13:10

## 2024-10-03 RX ADMIN — SUGAMMADEX 200 MG: 100 INJECTION, SOLUTION INTRAVENOUS at 13:21

## 2024-10-03 RX ADMIN — TRIAMCINOLONE ACETONIDE: 1 CREAM TOPICAL at 20:44

## 2024-10-03 RX ADMIN — METOCLOPRAMIDE 10 MG: 5 INJECTION, SOLUTION INTRAMUSCULAR; INTRAVENOUS at 06:12

## 2024-10-03 RX ADMIN — METOCLOPRAMIDE 10 MG: 5 INJECTION, SOLUTION INTRAMUSCULAR; INTRAVENOUS at 01:20

## 2024-10-03 RX ADMIN — FENTANYL CITRATE 50 MCG: 50 INJECTION INTRAMUSCULAR; INTRAVENOUS at 12:45

## 2024-10-03 RX ADMIN — LABETALOL HYDROCHLORIDE 5 MG: 5 INJECTION, SOLUTION INTRAVENOUS at 12:57

## 2024-10-03 RX ADMIN — LABETALOL HYDROCHLORIDE 2.5 MG: 5 INJECTION, SOLUTION INTRAVENOUS at 13:09

## 2024-10-03 RX ADMIN — FENTANYL CITRATE 50 MCG: 50 INJECTION INTRAMUSCULAR; INTRAVENOUS at 13:03

## 2024-10-03 RX ADMIN — MONTELUKAST 10 MG: 10 TABLET, FILM COATED ORAL at 21:21

## 2024-10-03 RX ADMIN — PANTOPRAZOLE SODIUM 40 MG: 40 INJECTION, POWDER, FOR SOLUTION INTRAVENOUS at 10:39

## 2024-10-03 RX ADMIN — FENTANYL CITRATE 50 MCG: 50 INJECTION, SOLUTION INTRAMUSCULAR; INTRAVENOUS at 14:00

## 2024-10-03 RX ADMIN — FENTANYL CITRATE 50 MCG: 50 INJECTION INTRAMUSCULAR; INTRAVENOUS at 13:26

## 2024-10-03 RX ADMIN — PIPERACILLIN AND TAZOBACTAM 4500 MG: 4; .5 INJECTION, POWDER, FOR SOLUTION INTRAVENOUS at 01:20

## 2024-10-03 ASSESSMENT — PAIN SCALES - GENERAL
PAINLEVEL_OUTOF10: 4
PAINLEVEL_OUTOF10: 6
PAINLEVEL_OUTOF10: 7
PAINLEVEL_OUTOF10: 10
PAINLEVEL_OUTOF10: 6
PAINLEVEL_OUTOF10: 10
PAINLEVEL_OUTOF10: 8
PAINLEVEL_OUTOF10: 6

## 2024-10-03 ASSESSMENT — PAIN DESCRIPTION - ORIENTATION
ORIENTATION: ANTERIOR
ORIENTATION: ANTERIOR
ORIENTATION: MID

## 2024-10-03 ASSESSMENT — PAIN - FUNCTIONAL ASSESSMENT
PAIN_FUNCTIONAL_ASSESSMENT: ACTIVITIES ARE NOT PREVENTED
PAIN_FUNCTIONAL_ASSESSMENT: PREVENTS OR INTERFERES SOME ACTIVE ACTIVITIES AND ADLS
PAIN_FUNCTIONAL_ASSESSMENT: NONE - DENIES PAIN
PAIN_FUNCTIONAL_ASSESSMENT: PREVENTS OR INTERFERES SOME ACTIVE ACTIVITIES AND ADLS

## 2024-10-03 ASSESSMENT — PAIN DESCRIPTION - DESCRIPTORS
DESCRIPTORS: ACHING

## 2024-10-03 ASSESSMENT — PAIN DESCRIPTION - FREQUENCY
FREQUENCY: CONTINUOUS
FREQUENCY: CONTINUOUS

## 2024-10-03 ASSESSMENT — PAIN DESCRIPTION - LOCATION
LOCATION: ABDOMEN

## 2024-10-03 ASSESSMENT — PAIN DESCRIPTION - PAIN TYPE
TYPE: SURGICAL PAIN
TYPE: SURGICAL PAIN

## 2024-10-03 ASSESSMENT — LIFESTYLE VARIABLES: SMOKING_STATUS: 0

## 2024-10-03 NOTE — PROGRESS NOTES
Pharmacy Consult for TPN/PPN Monitoring & Adjustment  IV Access: central   Dosing weight: 70 kg  Current insulin regimen: low sliding scale  Diet (excluding TPN): NPO  Maintenance fluid: none    Plan:  See components of tonight's TPN bag in the table below:  Date 09/30/24 10/1/24 10/2/24 10/3/24   Total kcal/kg 15 15 15 20   Total kcal 1050 1050 1050 1400   Protein g/kg 1.5 1.5 1.5 1.5   Protein g 105 105 105 105   Protein kcal (4 kcal/g) 420 420 420 420   Lipid % 30 30 30 30   Lipid g 31.5 31.5 31.5 42   Lipid kcal (10 kcal/g) 315 315 315 420   Dextrose g 92.6 92.6 92.6 164.7   Dextrose kcal (3.4 kcal/g) 315 315 315 560   Infusion Rate (mL/hr) 90 90 90 90   Na Acetate (mEq) 100 100 100 100   Na Chloride (mEq) 100 100 100 100   Na Phos (mmol)  (3 mmol = 4 mEq Na) 0 0 0 0   Total Na (mEq) 200 200 200 200   K Acetate (mEq) 40 40 40 0   K Chloride (mEq) 40 40 40 0   K Phos (mmol)  (15 mmol = 22 mEq K) 25 25 25 25   Total K (mEq) 116.67 116.67 116.67 36.67   Ca Gluconate (mEq)  (1 g = 4.65 mEq) 0 4.65 4.65 4.65   Mag Sulfate (mEq)  (1 g = 8.12 mEq) 16.24 16.24 16.24 16.24   Multivitamin (mL) 10 10 10 0   Trace Elements (mL) 1 1 1 1     Summary of changes for tonight's TPN:   Increase macronutrients to 20 kcal/kg total, 1.5 g/kg protein, 30% lipids per dietary   Remove Kacetate  Remove Kcl  Remove MVI (shortage- only in MWF bags)    Electrolyte Replacement:   None    Monitoring:  BMP, Mag, iCal, & Phos ordered for tomorrow with AM labs.    Pharmacy will continue to follow daily. Thank you for the consult.    Haim Nash, PharmD, BCPS  10/3/2024  11:34 AM

## 2024-10-03 NOTE — PROGRESS NOTES
Hospitalist Progress Note      Patient:  Baylee Irizarry 69 y.o. female     Unit/Bed:[facility] OR (General) POOL R*    Date of Admission: 9/26/2024      ASSESSMENT AND PLAN    Active Problems  Pericolonic fluid collection, possible abscess, diverticulitis: Irregular fluid collection, <3 cm seen on CT, elevated WBC 12.2, hemoglobin, endorses nausea and vomiting and abdominal pain on admission.   General Surgery made decision for surgery today due to leukocytosis. NPO.   Continue IV Zosyn.   Intractable n/v x 4 weeks. UGI 9/29 - contrast did pass. GI following. EGD with gastritis in the body and antrum with few inflammatory polyps, no biopsy was obtained.  Continue Reglan started 9/28. PRN rectal phenergan, IV Zofran / Compazine PRN. On IV PPI. Carafate has been started.  Moderate malnutrition. PICC placed and TPN started.   UTI(poa): Urine showed many bacteria and was orange. Culture E coli, sensitive to current treatment. Continue above Zosyn.  Hyperthyroidism, uncontrolled. Unable to keep orals down. TSH 0.005, free T4 3.42, free T3 5.4 Continue Tapazole 10 mg tid. Endocrinology consulted by general surgery. We currently do not have any endocrine coverage.   Resolved Problems  Dysphagia. SLP seen and cleared for regular diet with thin liquid when she is able to tolerate.  Hypokalemia due to GI loss, resolved with replacement. Mag noted to be 1.8.  Accelerated Hypertension, improved. Hx HTN: Lisinopril restarted. BP controlled.   Hyponatremia, resolved.  Leukocytosis:Likely due to n/v, resolved. 10.5 today. Afebrile.  Chronic Conditions (reviewed and stable unless otherwise stated)  Microcytic anemia. Hbg 9.3. CBC pending.   T2DM: BS trend in goal. Accu-Cheks qid. Hypoglycemia protocol Low-dose SSI.  Asthma: Continue albuterol nebulizer q6h prn  RIRI: Continue home CPAP  Lupus: Continue home Plaquenil 200 qd.  Morbid obesity. BMI 52.47.      LDA: []CVC / [x]PICC / []Midline / []Quick / []Drains / []Mediport /  bladder wall thickening perivesicular fat stranding. GI Colonic diverticulitis w/ pericolonic fat stranding. Also ill defined collection of fluid.\"     Medications:    Infusion Medications    PN-Adult  3 IN 1 Central Line (Custom)      PN-Adult  3 IN 1 Central Line (Custom) 90 mL/hr at 10/02/24 1825    sodium chloride 25 mL/hr at 10/02/24 0958    dextrose      Scheduled Medications    triamcinolone   Topical BID    sucralfate  1 g Oral 4x Daily AC & HS    pantoprazole (PROTONIX) 40 mg in sodium chloride (PF) 0.9 % 10 mL injection  40 mg IntraVENous Daily    piperacillin-tazobactam  4,500 mg IntraVENous q8h    metoclopramide  10 mg IntraVENous Q6H    sodium chloride flush  5-40 mL IntraVENous 2 times per day    diclofenac sodium  2 g Topical BID    hydroxychloroquine  200 mg Oral Daily    lisinopril  40 mg Oral Daily    montelukast  10 mg Oral Nightly    methIMAzole  10 mg Oral TID    enoxaparin  30 mg SubCUTAneous BID    insulin lispro  0-4 Units SubCUTAneous Q6H    PRN Meds: BUPivacaine (PF), promethazine, hydrALAZINE, calcium carbonate, sodium chloride flush, sodium chloride, potassium chloride **OR** potassium alternative oral replacement **OR** potassium chloride, magnesium sulfate, ondansetron **OR** ondansetron, polyethylene glycol, acetaminophen **OR** acetaminophen, prochlorperazine, albuterol, glucose, dextrose bolus **OR** dextrose bolus, glucagon (rDNA), dextrose    Exam:  BP (!) 177/70   Pulse 89   Temp 98.5 °F (36.9 °C) (Oral)   Resp 16   Ht 1.575 m (5' 2\")   Wt 130.1 kg (286 lb 14.4 oz)   SpO2 94%   BMI 52.47 kg/m²   General appearance: No apparent distress, appears stated age and cooperative.  HEENT: Pupils equal, round, and reactive to light. Conjunctivae/corneas clear.  Neck: Supple, with full range of motion. No jugular venous distention. Trachea midline.  Respiratory:  Normal respiratory effort. Clear to auscultation, bilaterally without Rales/Wheezes/Rhonchi.  Cardiovascular: Regular rate

## 2024-10-03 NOTE — FLOWSHEET NOTE
10/03/24 1605   Treatment Team Notification   Reason for Communication Review case   Name of Team Member Notified Dr Mejia   Treatment Team Role Consulting Provider   Method of Communication Secure Message   Response Waiting for response   Notification Time 1607     Dr Mejia messaged for pain medications for post-op. Patient c/o pain 8/10 and there is not any pain medications ordered.   Dr Mejia replied \"whatever she was preop. I just continued all orders\"

## 2024-10-03 NOTE — PROGRESS NOTES
[facility] DR GENERAL SURGERY  General Surgery Postoperative Progress Note    Pt Name: Baylee Irizarry  Medical Record Number: [id number]  Date of Birth 1955   Today's Date: 10/3/2024    CC:  Chief Complaint   Patient presents with    Fatigue    Emesis       ASSESSMENT   HD # 7  Doing chart review yesterday noticed that her white count had been drawn in the afternoon and it was 13,400.  Up from 10,000.  Will repeat today made her n.p.o. concerned that we are going to have to intervene on these abscesses and I discussed with the patient this morning that if her white count still up that I would recommend laparoscopic approach to drainage and hold off on resection so she does not have to have a colostomy at this time.  This is the SAGES recommendation for management of abscesses that are not able to be drained by IR  EGD showed gastritis, additional meds ordered        Preoperative Nutrition Screen (WOLF)   Patient's Age: 69 y.o.    Last Serum Albumin Level: 2.3  No results found for: \"LABALBU\"  Patient's BMI: Estimated body mass index is 52.47 kg/m² as calculated from the following:    Height as of this encounter: 1.575 m (5' 2\").    Weight as of this encounter: 130.1 kg (286 lb 14.4 oz).     If the answer to any of the following is Yes, then recommend prescribe Oral Nutrition Supplements (ONS) for at least 7 days prior to surgery and/or order referral to dietitian for further assessment and nutrition therapy.    1. Does the patient have a documented serum albumin less than 3.0 within the last 90 days?  Yes = 1        2. Is patient's BMI less than 18.5 (or less than 20 if age over 65)?  No = 0       3. Has the patient had an unplanned weight loss of 10% of body weight or more in the last 6 months? Yes = 1   4. Has the patient been eating less than 50% of their normal diet in the preceding week? Yes = 1   WOLF Score (number of Yes responses), 0-4 3   Electronically signed by Shun NAVARRO  SVC. No pulmonary infiltrates.               **This report has been created using voice recognition software. It may contain   minor errors which are inherent in voice recognition technology.**      Electronically signed by Dr. Nivia Knight      FL UGI   Final Result      Oral contrast did pass into proximal small bowel loops. Initially, there was   some delayed passage through the distal esophagus and stomach.         **This report has been created using voice recognition software. It may contain   minor errors which are inherent in voice recognition technology.**         Electronically signed by Dr. Nivia Knight      XR CHEST (2 VW)   Final Result   Impression:   Chronic bronchitis versus viral bronchiolitis subtly suggested in the    upper lobes predominantly see above for discussion.      This document has been electronically signed by: Eduardo Pedroza III, MD on    09/27/2024 03:20 AM      CT ABDOMEN PELVIS W IV CONTRAST Additional Contrast? None   Final Result   1. Colonic diverticulosis with pericolonic fat stranding adjacent to the mid   sigmoid colon suggests diverticulitis. No bowel obstruction is identified. No   free air is observed. Ill-defined pockets of fluid along the lateral margin of   the sigmoid colon measure up to 23 mm in diameter (series 301, image 61)   suspicious for peridiverticular abscess. The fat stranding surrounding the   sigmoid colon also surrounds the urinary bladder and the urinary bladder wall   appears thickened which could indicate infectious/inflammatory cystitis   secondary to diverticulitis. Correlate with urinalysis. A somewhat elongated   collection of fluid in the midline (series 301, image 68) may represent a second   peridiverticular abscess. It appears to extend along the post hysterectomy   portion of the pelvis. Continued follow-up to ensure resolution is advised.      2. Chronic findings are discussed.         **This report has been created using voice recognition

## 2024-10-03 NOTE — PLAN OF CARE
Problem: Chronic Conditions and Co-morbidities  Goal: Patient's chronic conditions and co-morbidity symptoms are monitored and maintained or improved  10/3/2024 0829 by Ivanna White RN  Outcome: Progressing  Flowsheets (Taken 10/3/2024 0755)  Care Plan - Patient's Chronic Conditions and Co-Morbidity Symptoms are Monitored and Maintained or Improved:   Monitor and assess patient's chronic conditions and comorbid symptoms for stability, deterioration, or improvement   Collaborate with multidisciplinary team to address chronic and comorbid conditions and prevent exacerbation or deterioration     Problem: Pain  Goal: Verbalizes/displays adequate comfort level or baseline comfort level  10/3/2024 0829 by Ivanna White, RN  Outcome: Progressing  Flowsheets (Taken 10/3/2024 0755)  Verbalizes/displays adequate comfort level or baseline comfort level:   Encourage patient to monitor pain and request assistance   Assess pain using appropriate pain scale   Administer analgesics based on type and severity of pain and evaluate response   Implement non-pharmacological measures as appropriate and evaluate response  Pain Assessment: 0-10  Pain Level: 4   Patient's Stated Pain Goal: 0 - No pain   Is pain goal met at this time?  No, patient denied need for pain medications.        Problem: Safety - Adult  Goal: Free from fall injury  10/3/2024 0829 by Ivanna White, RN  Outcome: Progressing   Fall assessment completed. Patient using call light appropriately to call for assistance with ambulation to bathroom. Personal items within reach. Patient is also compliant with use of non-skid slippers.     Problem: Skin/Tissue Integrity  Goal: Absence of new skin breakdown  Description: 1.  Monitor for areas of redness and/or skin breakdown  2.  Assess vascular access sites hourly  3.  Every 4-6 hours minimum:  Change oxygen saturation probe site  4.  Every 4-6 hours:  If on nasal continuous positive airway pressure, respiratory therapy  assess nares and determine need for appliance change or resting period.  10/3/2024 0829 by Ivanna White RN  Outcome: Progressing   Patient remains free from skin breakdown. Patient independent with turning.    Problem: Gastrointestinal - Adult  Goal: Minimal or absence of nausea and vomiting  10/3/2024 0829 by Ivanna White RN  Outcome: Progressing  Flowsheets (Taken 10/3/2024 0755)  Minimal or absence of nausea and vomiting:   Administer IV fluids as ordered to ensure adequate hydration   Administer ordered antiemetic medications as needed   Provide nonpharmacologic comfort measures as appropriate   Patient denies nausea and vomiting when asked. Will continue to monitor.    Problem: Gastrointestinal - Adult  Goal: Maintains or returns to baseline bowel function  10/3/2024 0829 by Ivanna White RN  Outcome: Progressing  Flowsheets (Taken 10/3/2024 0755)  Maintains or returns to baseline bowel function:   Assess bowel function   Administer IV fluids as ordered to ensure adequate hydration   Administer ordered medications as needed   Encourage mobilization and activity   Patient bowel sounds active. Passing flatus.    Problem: Discharge Planning  Goal: Discharge to home or other facility with appropriate resources  10/3/2024 0829 by Ivanna White RN  Outcome: Progressing  Flowsheets (Taken 10/3/2024 0755)  Discharge to home or other facility with appropriate resources:   Identify barriers to discharge with patient and caregiver   Arrange for needed discharge resources and transportation as appropriate   Identify discharge learning needs (meds, wound care, etc)   Discharge plan is home with spouse.    Problem: Nutrition Deficit:  Goal: Optimize nutritional status  10/3/2024 0829 by Iavnna White RN  Outcome: Progressing   Patient currently receiving TPN at this time.    Care plan reviewed with patient. Patient verbalize understanding of the plan of care and contribute to goal setting.

## 2024-10-03 NOTE — ANESTHESIA PRE PROCEDURE
potassium chloride 10 mEq/100 mL IVPB (Peripheral Line)  10 mEq IntraVENous PRN Shun Solitario DO   Stopped at 09/29/24 1314   • magnesium sulfate 2000 mg in 50 mL IVPB premix  2,000 mg IntraVENous PRN Shun Solitario DO       • ondansetron (ZOFRAN-ODT) disintegrating tablet 4 mg  4 mg Oral Q8H PRN Shun Solitario DO        Or   • ondansetron (ZOFRAN) injection 4 mg  4 mg IntraVENous Q6H PRN Shun Solitario DO   4 mg at 09/30/24 1059   • polyethylene glycol (GLYCOLAX) packet 17 g  17 g Oral Daily PRN Shun Solitario DO       • acetaminophen (TYLENOL) tablet 650 mg  650 mg Oral Q6H PRN Shun Solitario DO        Or   • acetaminophen (TYLENOL) suppository 650 mg  650 mg Rectal Q6H PRN Shun Solitario DO       • prochlorperazine (COMPAZINE) injection 10 mg  10 mg IntraVENous Q6H PRN Behl, Ashita, MD   10 mg at 10/01/24 2123   • albuterol (PROVENTIL) (2.5 MG/3ML) 0.083% nebulizer solution 2.5 mg  2.5 mg Nebulization Q6H PRN Shun Solitario DO       • diclofenac sodium (VOLTAREN) 1 % gel 2 g  2 g Topical BID Shun Solitario DO   2 g at 09/27/24 0811   • hydroxychloroquine (PLAQUENIL) tablet 200 mg  200 mg Oral Daily Shun Solitario DO   200 mg at 09/28/24 0843   • lisinopril (PRINIVIL;ZESTRIL) tablet 40 mg  40 mg Oral Daily Behl, Ashita, MD   40 mg at 10/01/24 0952   • montelukast (SINGULAIR) tablet 10 mg  10 mg Oral Nightly Shun Solitario DO   10 mg at 09/30/24 2033   • glucose chewable tablet 16 g  4 tablet Oral PRN Shun Solitario DO       • dextrose bolus 10% 125 mL  125 mL IntraVENous PRN Shun Solitario DO        Or   • dextrose bolus 10% 250 mL  250 mL IntraVENous PRN Shun Solitario DO       • glucagon injection 1 mg  1 mg SubCUTAneous PRN Shun Solitario DO       • dextrose 10 % infusion   IntraVENous Continuous PRN Shun Solitario DO       • methIMAzole (TAPAZOLE) tablet 10 mg  10 mg Oral TID Shun Solitario DO   10 mg at 10/02/24 1456   • enoxaparin Sodium (LOVENOX) injection

## 2024-10-03 NOTE — CARE COORDINATION
10/3/24, 3:28 PM EDT    DISCHARGE ON GOING EVALUATION    Baylee Batesluma       [facility] day: 7  Location: -11/011-A Reason for admit: Diverticulitis [K57.92]  Diverticulitis of large intestine with abscess, unspecified bleeding status [K57.20]     Procedures:   9/30 PICC  9/30 EGD  10/03 Robotic assisted drainage of pelvic abscess from perforated diverticulitis   Imaging since last note:   10/01 CT of pelvis:IMPRESSION:  The patient's anatomy changed positions upon placing in the prone  position and there was no safe route to aspirate the pelvic abscess.    Barriers to Discharge: Hospitalist, General Surgery, GI, and ID following. WBC 13.5. TPN per pharmacy dosing, Lovenox, ISS, IV Reglan scheduled, IV Protonix, Zosyn, daily labs, PT/OT, SCD, up with assistance.     PCP: Myriam Sun, KALIN - CNP  Readmission Risk Score: 12.8    Patient Goals/Plan/Treatment Preferences: Baylee is from home with her . Following for potential needs.

## 2024-10-03 NOTE — PROGRESS NOTES
Comprehensive Nutrition Assessment    Type and Reason for Visit:  Reassess    Nutrition Recommendations/Plan:   Recommend TPN macronutrient changes for next bag - increase kcals to 20/kgm; continue with 1.5 gm protein/kgm and 30% kcals from lipids; dosing wt: 70 kgm (goal).  Diet vs. NPO per surgery.  Will resume Ensure Clear TID once CL diet; transition to Glucerna ONS TID once FL diet or greater.  Recommend continue PN until tolerance of FL diet or greater.  Will monitor need for additional nutrition interventions, diet education.     Malnutrition Assessment:  Malnutrition Status:  Moderate malnutrition (09/28/24 1303)    Context:  Chronic Illness     Findings of the 6 clinical characteristics of malnutrition:  Energy Intake:  75% or less estimated energy requirements for 1 month or longer (Poor PO for the last ~1.5-2 months; on going N/V)  Weight Loss:   (reports losing ~100# in the last year unintentionally; 40-50# recently per pt recall; states she was recently in 300s and now 282# per rough estimate bedscale wt today; note edema and hx/o CHF as well)     Body Fat Loss:  No significant body fat loss     Muscle Mass Loss:  No significant muscle mass loss    Fluid Accumulation:  Mild Generalized   Strength:  Not Performed    Nutrition Assessment:     Remains at risk for further nutritional compromise r/t altered GI function; admit with N/V ongoing for last month or two, hypokalemia 2/2 GI losses, UTI, pericolonic fluid collection - possible abscess - diverticulitis, suspected gastroparesis, leukocytosis suspected d/t N/V, gastritis and underlying medical condition (PMHx: anemia, anxiety, CHF, COPD, T2DM, HTN, lupus, obesity, RIRI, OA, former smoker).     Nutrition Related Findings:    Pt. Report/Treatments/Miscellaneous:   10/3- pt. Seen - states feeling a little worse today; reports increase in pain; denies any nausea or vomiting; states stomach is growling, \"gurgly\" reports consumed broth, crackers, jello  and juice yesterday; MD noting ? laparoscopic approach to drainage to intervene on abscesses  10/2- pt. Seen - reports her belly is \"quieting down\"; denies any nausea or abdominal pain; CL diet is staying down; reports passing gas; states acceptance of Ensure Clear; agrees to trial Glucerna ONS once FL diet; discussed PN w/ pt; provided pt. With warm blankets as reports feeling cold  10/1- Pt seen this morning during breakfast. Pt tolerating sips and bites of broth and Jello and is tolerating it well. Pt reports ongoing nausea and emesis with lots of flatus today. Pt states the Zofran and Compazine PRN help with the nausea. Pt amenable to try Ensure Clear TID. SLP seen pt this morning and recommend a Regular diet with Thin liquids when okay with General Surgery. EGD from 9/30 showed gastritis- additional medications ordered.  9/30- pt. Seen - PN off as peripheral line infiltrated; s/p PICC line placement - awaiting confirmation of placement; reports +N/V, burping; denies passing any gas; plan for EGD today; SLP evaluation pending; s/p UGI 9/29-Oral contrast did pass into proximal small bowel loops. Initially, there was some delayed passage through the distal esophagus and stomach  9/28-Pt seen - closing eyes throughout conversation, c/o N/V and inability for PO intake for the last 1.5-2 months. She states that she has gone down 5 pant sizes and feels that most recently her N/V resulted in 40-50# weight loss. Pt aware of plan for PN initiation d/t GI function at this time; pt declining PPN start until PICC placed - educated pt on the difference and pt still not in agreement until PICC placed. Encouraged pt to think about it.   GI Status: smear BM 10/1  Pertinent Labs: 10/3: Glucose 116, BUN 21, Cr 0.3, Potassium 5.2, Magnesium 1.9, Phosphorus 3.4  Pertinent Meds: Carafate, Reglan, Phenergan, Zofran, ATB, Glycolax      Wound Type: None       Current Nutrition Intake & Therapies:    Average Meal Intake: NPO  Average

## 2024-10-03 NOTE — PLAN OF CARE
Problem: Chronic Conditions and Co-morbidities  Goal: Patient's chronic conditions and co-morbidity symptoms are monitored and maintained or improved  Outcome: Progressing  Flowsheets (Taken 10/2/2024 1945)  Care Plan - Patient's Chronic Conditions and Co-Morbidity Symptoms are Monitored and Maintained or Improved:   Monitor and assess patient's chronic conditions and comorbid symptoms for stability, deterioration, or improvement   Collaborate with multidisciplinary team to address chronic and comorbid conditions and prevent exacerbation or deterioration   Update acute care plan with appropriate goals if chronic or comorbid symptoms are exacerbated and prevent overall improvement and discharge     Problem: Pain  Goal: Verbalizes/displays adequate comfort level or baseline comfort level  Outcome: Progressing     Problem: Safety - Adult  Goal: Free from fall injury  Outcome: Progressing     Problem: Skin/Tissue Integrity  Goal: Absence of new skin breakdown  Description: 1.  Monitor for areas of redness and/or skin breakdown  2.  Assess vascular access sites hourly  3.  Every 4-6 hours minimum:  Change oxygen saturation probe site  4.  Every 4-6 hours:  If on nasal continuous positive airway pressure, respiratory therapy assess nares and determine need for appliance change or resting period.  Outcome: Progressing     Problem: Gastrointestinal - Adult  Goal: Minimal or absence of nausea and vomiting  Outcome: Progressing  Flowsheets (Taken 10/2/2024 1945)  Minimal or absence of nausea and vomiting:   Administer IV fluids as ordered to ensure adequate hydration   Administer ordered antiemetic medications as needed     Problem: Gastrointestinal - Adult  Goal: Maintains or returns to baseline bowel function  Outcome: Progressing  Flowsheets (Taken 10/2/2024 1945)  Maintains or returns to baseline bowel function:   Assess bowel function   Administer IV fluids as ordered to ensure adequate hydration   Administer ordered

## 2024-10-03 NOTE — PROGRESS NOTES
[facility]  INPATIENT PHYSICAL THERAPY  EVALUATION  STRZ ONC MED 5K - STRZ OR (General) POOL R*    Discharge Recommendations:Discharge Recommendations: Home with Home health PT  Equipment Recommendations: No (has RW)           Time In: 0850  Time Out: 09  Timed Code Treatment Minutes: 23 Minutes  Minutes: 42        Date: 10/3/2024  Patient Name: Baylee Irizarry,  Gender:  female        MRN: [id number]  : 1955  (69 y.o.)      Referring Practitioner: Jenna Wilcox PA  Diagnosis: Diverticulitis  Additional Pertinent Hx: Per EMR:Baylee Irizarry is a 69 y.o. female with PMHx of Anemia, Anxiety, Asthma, CHF, COPD, MDD, DM2, HA, Lupus, HTN, Obesity, RIRI who presents to [facility] ED with CC:1 month emesis and difficulty eating. On  given potassium at [facility], PCP advised her to come in. C/o abdominal soreness. Pain 7/10,   [facility] said thyroid problem. Denied fever no chills, feels cold. Denies urinary complaints,   On interview today patient states that she has been unable to keep much down for the past month.  She says pretty much all she can take in is 7 up.  She has been unable to take medications.  As such presented with BP significantly elevated.  Endorses nausea, vomiting, says she is always cold but denies fever or chills, she has no urinary complaints but does endorse that she has had frequent UTIs in the last 6 months.  Patient goes to [facility] for most care.  Denies CP but does endorse palpitations which happen occasionally.  Nausea and vomiting is her biggest complaint at this time. Pt underwent EGD on  with showed gastritis. Per notes,Patient with Pericolonic fluid collection, possible abscess, diverticulitis.     Restrictions/Precautions:  Restrictions/Precautions: General Precautions  Position Activity Restriction  Other position/activity restrictions: Pt refused to wear hospital socks in room, but wore them in the hallway.    Subjective:  Chart Reviewed: Yes  Patient assessed for  rehabilitation services?: Yes  Family / Caregiver Present: No  Subjective: RN approved session, patient sitting up in bed and asking to get up to go to the bathroom. Pt also asking therapist to cut off her arm bands as she is allergic to the plastic. RN okay to cut bands off. Pt refused to wear gripper socks to/from bathroom stating they make it hard for her to walk. Pt explained risks of falling without proper footwear. Pt willing to wear socks in the hallway with therapist. Pt interested in Home health PT at discharge.    General:  Overall Orientation Status: Within Functional Limits  Vision: Within Functional Limits  Hearing: Within functional limits     Pain: None reported    Vitals:   Vitals:    10/03/24 0755   BP: (!) 177/70   Pulse: 89   Resp: 16   Temp: 98.5 °F (36.9 °C)   SpO2: 94%   Sp02 95% on room air after ambulation, HR 96 after ambulation    Social/Functional History:    Lives With: Significant other (ex-)  Type of Home: House  Home Layout: One level  Home Access: Ramped entrance (Pt reports ramp is not very stable)  Home Equipment: Walker - Rolling, Electric scooter (stand up walker)     Bathroom Shower/Tub: Walk-in shower  Bathroom Toilet: Handicap height (arms on the toilet riser)  Bathroom Equipment: Shower chair, Grab bars around toilet       ADL Assistance: Independent  Homemaking Assistance: Independent  Ambulation Assistance: Independent  Transfer Assistance: Independent    Active : Yes  Type of Occupation: Pt manages properties  Additional Comments: Patient reports she is IND PTA without use of an AD. Pt still works managing apartment complex. Pt reports she uses a motorized scooter when getting needing to do longer distances. Ex- is not able to physically assist as he has a history of 3 strokes.    OBJECTIVE:  Range of Motion:  Bilateral Lower Extremity: WFL    Strength:  Bilateral Lower Extremity: grossly deconditioned.     Balance:  Static Standing Balance: Stand By

## 2024-10-03 NOTE — PROGRESS NOTES
[facility]  OCCUPATIONAL THERAPY MISSED TREATMENT NOTE  STRZ OR  STRZ OR (General) POOL R*      Date: 10/3/2024  Patient Name: Baylee Irizarry        CSN: [id number]   : 1955  (69 y.o.)  Gender: female                REASON FOR MISSED TREATMENT:  first attempt to eval patient at 0931 with PT still in room with patient and just finishing up. Second attempt to eval patient at 1305 with patient off floor in OR for ROBOTIC DRAINAGE PELVIC ABSCESS. OT to check back another date.

## 2024-10-03 NOTE — PROGRESS NOTES
[facility] Infectious Disease         Progress Note      Baylee Irizarry  MEDICAL RECORD NUMBER:  [id number]  AGE: 69 y.o.   GENDER: female  : 1955  EPISODE DATE:  2024      Assessment:     Pt is a 69yof who I am consulted for intra-abdominal abscess. Currently on therapy with pip/tazo.      The fluid collections in the abdomen near mid line pelvic as well as mid sigmoid have both increased in size. The sigmoid collection is fairly small and the location of the midline collection is fairly close in proximity to bladder. They both may be difficult to access. While on empiric therapy, the size has increased which is concerning for 3 possibilities. 1. The collection is not being penetrated by antibiotics (most likely). 2. The organisms present in the collection are discordant (resistant) to antibiotic coverage (alternative may be ceftriaxone or FQ paired with clarke). The issue should not be coverage in my opinion as this regimen should adequately cover most of the enterobacteriaceae and anaerobes that would be involved.      I would consider having intervention radiology evaluate for possible drainage of the mid line collection as this will be both therapeutic and diagnostic. I would continue pip/tazo for now though an alternative may be the regimens listed above. Increasing abscess size to 4cm reduces the likelihood of antibiotic success as monotherapy. Repeat imaging in 4-5 days.      - Continue pip/tazo  - Alternative may be CTX w/ clarke or FQ w/ clarke   - I do have some hesitance with increasing abscess size  - On chart review, documented allergies (unspecified rxn) to quinolone and keflex (tolerating zosyn)   - Repeat imaging (recommend 10/5 CT a/p w/ con)  - Unable to obtain IR drainage as procedure could not be performed safely      Subjective:     Chief Complaint   Patient presents with    Fatigue    Emesis         Pt resting comfortably in bed. She has no complaints or concerns this morning  palpation.  Extremities: no edema  Skin:  Warm and dry.  CNS: aox3       LABS       CBC:   Lab Results   Component Value Date/Time    WBC 13.4 10/02/2024 02:30 PM    HGB 9.2 10/02/2024 02:30 PM    HCT 30.0 10/02/2024 02:30 PM    MCV 77.5 10/02/2024 02:30 PM     10/02/2024 02:30 PM     BMP:   Lab Results   Component Value Date/Time     10/02/2024 06:00 AM    K 4.4 10/02/2024 06:00 AM     10/02/2024 06:00 AM    CO2 25 10/02/2024 06:00 AM    PHOS 3.3 10/02/2024 06:00 AM    BUN 25 10/02/2024 06:00 AM    CREATININE 0.4 10/02/2024 06:00 AM     PT/INR:   Lab Results   Component Value Date/Time    PROTIME 12.4 11/11/2023 09:01 AM    INR 1.1 11/11/2023 09:01 AM     Prealbumin: No results found for: \"PREALBUMIN\"  Albumin:No results found for: \"LABALBU\"  Sed Rate:  Lab Results   Component Value Date/Time    SEDRATE 51 11/11/2023 09:01 AM     CRP:   Lab Results   Component Value Date/Time    CRP 4.43 12/01/2014 03:27 PM     Micro:   Lab Results   Component Value Date/Time    BC  09/26/2024 12:16 PM     No growth 24 hours. No growth 48 hours. No growth at 5 days      Hemoglobin A1C:   Lab Results   Component Value Date/Time    LABA1C 6.2 04/20/2018 01:30 PM           Electronically signed by Devin Lovett MD TD@ at 6:49 AM

## 2024-10-03 NOTE — PROGRESS NOTES
1334: Pt arrives to pacu, awakens to verbal stimuli. Pt on room air, respirations easy and unlabored. VSS  1341: Pt c/o pain 10/10, 0.5mg of dilaudid administered  1346: No change in pain, 0.5mg of dilaudid given  1355: Pt sleeping, awakens to voice. Continues to state pain 10/10, 50mcg of fentanyl administered  1400: No change, 50mcg of fentanyl given  1410: Pt sleeping, awakens to voice and quickly drifts back to sleep.  1415: Report given to Ivanna on   1420: Pt meets criteria for discharge from pacu, awaiting transport  1440: Pt transported to  in stable condition. VSS

## 2024-10-03 NOTE — ANESTHESIA POSTPROCEDURE EVALUATION
Department of Anesthesiology  Postprocedure Note    Patient: Baylee Irizarry  MRN: [id number]  YOB: 1955  Date of evaluation: 10/3/2024    Procedure Summary       Date: 10/03/24 Room / Location: [facility] OR  / [facility] OR    Anesthesia Start: 1227 Anesthesia Stop: 1336    Procedure: ROBOTIC DRAINAGE PELVIC ABSCESS (Abdomen) Diagnosis:       Diverticulitis      (Diverticulitis [K57.92])    Surgeons: Shun Mejia MD Responsible Provider: Timoteo Mckeon DO    Anesthesia Type: general ASA Status: 3            Anesthesia Type: No value filed.    Henry Phase I: Henry Score: 9    Henry Phase II: Henry Score: 10    Anesthesia Post Evaluation    Patient location during evaluation: PACU  Patient participation: complete - patient participated  Level of consciousness: awake and alert  Airway patency: patent  Nausea & Vomiting: no vomiting and no nausea  Cardiovascular status: hemodynamically stable  Respiratory status: acceptable  Hydration status: stable  Pain management: adequate    No notable events documented.

## 2024-10-03 NOTE — FLOWSHEET NOTE
10/03/24 1609   Treatment Team Notification   Reason for Communication Review case   Name of Team Member Notified Jenna Wilcox   Treatment Team Role Physician Assistant   Method of Communication Secure Message   Response Waiting for response   Notification Time 1609     Message sent asking for pain medication orders. Patient returned from surgery without pain medications orders.  ALETHEA West entered orders.

## 2024-10-03 NOTE — OP NOTE
[facility]  Operative Report    PATIENT NAME: Baylee Irizarry  MEDICAL RECORD NO. [id number]  SURGEON: Shun Mejia MD MD FACS  Primary Care Physician: Myriam Sun APRN - CORTNEY  Date: 10/3/2024, 1:21 PM     PROCEDURE PERFORMED: Robotic assisted drainage of pelvic abscess from perforated diverticulitis  PREOPERATIVE DIAGNOSIS:   Active Hospital Problems    Diagnosis Date Noted    Moderate malnutrition (HCC) [E44.0] 09/28/2024    Acute cystitis without hematuria [N30.00] 09/28/2024    Leukocytosis [D72.829] 09/28/2024    Uncomplicated asthma [J45.909] 09/28/2024    RIRI (obstructive sleep apnea) [G47.33] 09/28/2024    Lupus [ICM9314] 09/28/2024    Diverticulitis of large intestine with abscess [K57.20] 09/27/2024    Diverticulitis [K57.92] 09/26/2024    Hyperthyroidism [E05.90] 09/26/2024    Morbid obesity [E66.01] 09/26/2024    HTN (hypertension) [I10]     Controlled diabetes mellitus type 2 with complications (HCC) [E11.8]     Microcytic anemia [D50.9]       POSTOPERATIVE DIAGNOSIS: Same, path pending  SURGEON:  Shun Mejia MD MD [facility]  ANESTHESIA:  General endotracheal anesthesia and local  ANESTHESIA:  30 OF 0.5% Marcaine and 1% xylocaine in equal parts  ESTIMATED BLOOD LOSS:  30  ml  SPECIMEN: none  COMPLICATIONS:  None; patient tolerated the procedure well.  DRAINS: (1) 19 Camron drain(s) in the pelvis  DISPOSITION: Recovery Room  CONDITION: stable    Findings:  Infection Present At Time Of Surgery (PATOS) (choose all levels that have infection present):  - Organ Space infection (below fascia) present as evidenced by pus  Other Findings: Small pelvic abscess between the sigmoid colon and bladder    Narrative:    Patient brought to the operating placed spine as he is not placed on consent on the chart preoperative dose of Zosyn was sent down from the floor was started in the operating room.  After general anesthesia abdomen was prepped and draped with ChloraPrep after allowing

## 2024-10-04 LAB
ANION GAP SERPL CALC-SCNC: 7 MEQ/L (ref 8–16)
BASOPHILS ABSOLUTE: 0 THOU/MM3 (ref 0–0.1)
BASOPHILS NFR BLD AUTO: 0.3 %
BUN SERPL-MCNC: 21 MG/DL (ref 7–22)
CA-I BLD ISE-SCNC: 1.19 MMOL/L (ref 1.12–1.32)
CALCIUM SERPL-MCNC: 8.6 MG/DL (ref 8.5–10.5)
CHLORIDE SERPL-SCNC: 101 MEQ/L (ref 98–111)
CO2 SERPL-SCNC: 28 MEQ/L (ref 23–33)
CREAT SERPL-MCNC: 0.4 MG/DL (ref 0.4–1.2)
DEPRECATED RDW RBC AUTO: 40.3 FL (ref 35–45)
EOSINOPHIL NFR BLD AUTO: 0 %
EOSINOPHILS ABSOLUTE: 0 THOU/MM3 (ref 0–0.4)
ERYTHROCYTE [DISTWIDTH] IN BLOOD BY AUTOMATED COUNT: 14.4 % (ref 11.5–14.5)
GFR SERPL CREATININE-BSD FRML MDRD: > 90 ML/MIN/1.73M2
GLUCOSE BLD STRIP.AUTO-MCNC: 122 MG/DL (ref 70–108)
GLUCOSE BLD STRIP.AUTO-MCNC: 128 MG/DL (ref 70–108)
GLUCOSE BLD STRIP.AUTO-MCNC: 136 MG/DL (ref 70–108)
GLUCOSE BLD STRIP.AUTO-MCNC: 186 MG/DL (ref 70–108)
GLUCOSE SERPL-MCNC: 150 MG/DL (ref 70–108)
HCT VFR BLD AUTO: 32.2 % (ref 37–47)
HGB BLD-MCNC: 9.8 GM/DL (ref 12–16)
IMM GRANULOCYTES # BLD AUTO: 0.2 THOU/MM3 (ref 0–0.07)
IMM GRANULOCYTES NFR BLD AUTO: 1.2 %
LYMPHOCYTES ABSOLUTE: 1.4 THOU/MM3 (ref 1–4.8)
LYMPHOCYTES NFR BLD AUTO: 8.4 %
MAGNESIUM SERPL-MCNC: 2.2 MG/DL (ref 1.6–2.4)
MCH RBC QN AUTO: 23.7 PG (ref 26–33)
MCHC RBC AUTO-ENTMCNC: 30.4 GM/DL (ref 32.2–35.5)
MCV RBC AUTO: 78 FL (ref 81–99)
MONOCYTES ABSOLUTE: 1.4 THOU/MM3 (ref 0.4–1.3)
MONOCYTES NFR BLD AUTO: 8.4 %
NEUTROPHILS ABSOLUTE: 13.2 THOU/MM3 (ref 1.8–7.7)
NEUTROPHILS NFR BLD AUTO: 81.7 %
NRBC BLD AUTO-RTO: 0 /100 WBC
PHOSPHATE SERPL-MCNC: 3.2 MG/DL (ref 2.4–4.7)
PLATELET # BLD AUTO: 345 THOU/MM3 (ref 130–400)
PMV BLD AUTO: 13 FL (ref 9.4–12.4)
POTASSIUM SERPL-SCNC: 4.8 MEQ/L (ref 3.5–5.2)
RBC # BLD AUTO: 4.13 MILL/MM3 (ref 4.2–5.4)
SODIUM SERPL-SCNC: 136 MEQ/L (ref 135–145)
WBC # BLD AUTO: 16.1 THOU/MM3 (ref 4.8–10.8)

## 2024-10-04 PROCEDURE — 82330 ASSAY OF CALCIUM: CPT

## 2024-10-04 PROCEDURE — 6360000002 HC RX W HCPCS: Performed by: SURGERY

## 2024-10-04 PROCEDURE — 6360000002 HC RX W HCPCS: Performed by: NURSE PRACTITIONER

## 2024-10-04 PROCEDURE — 36415 COLL VENOUS BLD VENIPUNCTURE: CPT

## 2024-10-04 PROCEDURE — 6360000002 HC RX W HCPCS: Performed by: PHYSICIAN ASSISTANT

## 2024-10-04 PROCEDURE — 1200000000 HC SEMI PRIVATE

## 2024-10-04 PROCEDURE — 99024 POSTOP FOLLOW-UP VISIT: CPT | Performed by: SURGERY

## 2024-10-04 PROCEDURE — 85025 COMPLETE CBC W/AUTO DIFF WBC: CPT

## 2024-10-04 PROCEDURE — 80048 BASIC METABOLIC PNL TOTAL CA: CPT

## 2024-10-04 PROCEDURE — 2580000003 HC RX 258: Performed by: SURGERY

## 2024-10-04 PROCEDURE — 82948 REAGENT STRIP/BLOOD GLUCOSE: CPT

## 2024-10-04 PROCEDURE — 83735 ASSAY OF MAGNESIUM: CPT

## 2024-10-04 PROCEDURE — 6370000000 HC RX 637 (ALT 250 FOR IP): Performed by: SURGERY

## 2024-10-04 PROCEDURE — 84100 ASSAY OF PHOSPHORUS: CPT

## 2024-10-04 PROCEDURE — 2580000003 HC RX 258: Performed by: NURSE PRACTITIONER

## 2024-10-04 PROCEDURE — 99232 SBSQ HOSP IP/OBS MODERATE 35: CPT | Performed by: PHYSICIAN ASSISTANT

## 2024-10-04 PROCEDURE — 97116 GAIT TRAINING THERAPY: CPT

## 2024-10-04 PROCEDURE — 99233 SBSQ HOSP IP/OBS HIGH 50: CPT | Performed by: STUDENT IN AN ORGANIZED HEALTH CARE EDUCATION/TRAINING PROGRAM

## 2024-10-04 PROCEDURE — 2500000003 HC RX 250 WO HCPCS: Performed by: NURSE PRACTITIONER

## 2024-10-04 PROCEDURE — 97110 THERAPEUTIC EXERCISES: CPT

## 2024-10-04 RX ADMIN — HYDRALAZINE HYDROCHLORIDE 10 MG: 20 INJECTION INTRAMUSCULAR; INTRAVENOUS at 00:30

## 2024-10-04 RX ADMIN — ENOXAPARIN SODIUM 30 MG: 100 INJECTION SUBCUTANEOUS at 20:17

## 2024-10-04 RX ADMIN — METOCLOPRAMIDE 10 MG: 5 INJECTION, SOLUTION INTRAMUSCULAR; INTRAVENOUS at 00:30

## 2024-10-04 RX ADMIN — METHIMAZOLE 10 MG: 10 TABLET ORAL at 12:57

## 2024-10-04 RX ADMIN — HYDROMORPHONE HYDROCHLORIDE 0.5 MG: 1 INJECTION, SOLUTION INTRAMUSCULAR; INTRAVENOUS; SUBCUTANEOUS at 10:18

## 2024-10-04 RX ADMIN — LISINOPRIL 40 MG: 40 TABLET ORAL at 10:08

## 2024-10-04 RX ADMIN — PIPERACILLIN AND TAZOBACTAM 4500 MG: 4; .5 INJECTION, POWDER, FOR SOLUTION INTRAVENOUS at 12:45

## 2024-10-04 RX ADMIN — METHIMAZOLE 10 MG: 10 TABLET ORAL at 10:08

## 2024-10-04 RX ADMIN — TRIAMCINOLONE ACETONIDE: 1 CREAM TOPICAL at 10:09

## 2024-10-04 RX ADMIN — METOCLOPRAMIDE 10 MG: 5 INJECTION, SOLUTION INTRAMUSCULAR; INTRAVENOUS at 12:46

## 2024-10-04 RX ADMIN — PIPERACILLIN AND TAZOBACTAM 4500 MG: 4; .5 INJECTION, POWDER, FOR SOLUTION INTRAVENOUS at 03:44

## 2024-10-04 RX ADMIN — PANTOPRAZOLE SODIUM 40 MG: 40 INJECTION, POWDER, FOR SOLUTION INTRAVENOUS at 10:18

## 2024-10-04 RX ADMIN — ENOXAPARIN SODIUM 30 MG: 100 INJECTION SUBCUTANEOUS at 10:21

## 2024-10-04 RX ADMIN — TRIAMCINOLONE ACETONIDE: 1 CREAM TOPICAL at 20:17

## 2024-10-04 RX ADMIN — HYDROMORPHONE HYDROCHLORIDE 0.5 MG: 1 INJECTION, SOLUTION INTRAMUSCULAR; INTRAVENOUS; SUBCUTANEOUS at 03:40

## 2024-10-04 RX ADMIN — HYDROMORPHONE HYDROCHLORIDE 0.5 MG: 1 INJECTION, SOLUTION INTRAMUSCULAR; INTRAVENOUS; SUBCUTANEOUS at 18:07

## 2024-10-04 RX ADMIN — METOCLOPRAMIDE 10 MG: 5 INJECTION, SOLUTION INTRAMUSCULAR; INTRAVENOUS at 23:45

## 2024-10-04 RX ADMIN — CALCIUM GLUCONATE: 98 INJECTION, SOLUTION INTRAVENOUS at 18:33

## 2024-10-04 RX ADMIN — METOCLOPRAMIDE 10 MG: 5 INJECTION, SOLUTION INTRAMUSCULAR; INTRAVENOUS at 06:08

## 2024-10-04 RX ADMIN — METOCLOPRAMIDE 10 MG: 5 INJECTION, SOLUTION INTRAMUSCULAR; INTRAVENOUS at 18:13

## 2024-10-04 RX ADMIN — PIPERACILLIN AND TAZOBACTAM 4500 MG: 4; .5 INJECTION, POWDER, FOR SOLUTION INTRAVENOUS at 20:17

## 2024-10-04 ASSESSMENT — PATIENT HEALTH QUESTIONNAIRE - PHQ9
SUM OF ALL RESPONSES TO PHQ QUESTIONS 1-9: 0
2. FEELING DOWN, DEPRESSED OR HOPELESS: NOT AT ALL
SUM OF ALL RESPONSES TO PHQ QUESTIONS 1-9: 0
SUM OF ALL RESPONSES TO PHQ9 QUESTIONS 1 & 2: 0
1. LITTLE INTEREST OR PLEASURE IN DOING THINGS: NOT AT ALL

## 2024-10-04 ASSESSMENT — PAIN - FUNCTIONAL ASSESSMENT
PAIN_FUNCTIONAL_ASSESSMENT: ACTIVITIES ARE NOT PREVENTED
PAIN_FUNCTIONAL_ASSESSMENT: ACTIVITIES ARE NOT PREVENTED
PAIN_FUNCTIONAL_ASSESSMENT: PREVENTS OR INTERFERES SOME ACTIVE ACTIVITIES AND ADLS

## 2024-10-04 ASSESSMENT — PAIN DESCRIPTION - FREQUENCY: FREQUENCY: CONTINUOUS

## 2024-10-04 ASSESSMENT — PAIN DESCRIPTION - DESCRIPTORS
DESCRIPTORS: ACHING

## 2024-10-04 ASSESSMENT — PAIN DESCRIPTION - ORIENTATION
ORIENTATION: MID
ORIENTATION: ANTERIOR
ORIENTATION: MID

## 2024-10-04 ASSESSMENT — PAIN SCALES - GENERAL
PAINLEVEL_OUTOF10: 7
PAINLEVEL_OUTOF10: 5
PAINLEVEL_OUTOF10: 4
PAINLEVEL_OUTOF10: 7
PAINLEVEL_OUTOF10: 6
PAINLEVEL_OUTOF10: 4
PAINLEVEL_OUTOF10: 8

## 2024-10-04 ASSESSMENT — PAIN DESCRIPTION - LOCATION
LOCATION: ABDOMEN

## 2024-10-04 ASSESSMENT — PAIN DESCRIPTION - PAIN TYPE: TYPE: SURGICAL PAIN

## 2024-10-04 ASSESSMENT — PAIN DESCRIPTION - ONSET: ONSET: ON-GOING

## 2024-10-04 NOTE — PROGRESS NOTES
Pharmacy Consult for TPN/PPN Monitoring & Adjustment  IV Access: central      Dosing weight: 70 kg  Current insulin regimen: low sliding scale  Diet (excluding TPN): Clear liquid diet  Maintenance fluid: none    Plan:  See components of tonight's TPN bag in the table below:  Date 09/30/24 10/1/24 10/2/24 10/3/24 10/4/24   Total kcal/kg 15 15 15 20 20   Total kcal 1050 1050 1050 1400 1400   Protein g/kg 1.5 1.5 1.5 1.5 1.5   Protein g 105 105 105 105 105   Protein kcal (4 kcal/g) 420 420 420 420 420   Lipid % 30 30 30 30 30   Lipid g 31.5 31.5 31.5 42 42   Lipid kcal (10 kcal/g) 315 315 315 420 420   Dextrose g 92.6 92.6 92.6 164.7 164.7   Dextrose kcal (3.4 kcal/g) 315 315 315 560 560   Infusion Rate (mL/hr) 90 90 90 90 90   Na Acetate (mEq) 100 100 100 100 100   Na Chloride (mEq) 100 100 100 100 100   Na Phos (mmol)  (3 mmol = 4 mEq Na) 0 0 0 0 0   Total Na (mEq) 200 200 200 200 200   K Acetate (mEq) 40 40 40 0 0   K Chloride (mEq) 40 40 40 0 0   K Phos (mmol)  (15 mmol = 22 mEq K) 25 25 25 25 25   Total K (mEq) 116.67 116.67 116.67 36.67 36.67   Ca Gluconate (mEq)  (1 g = 4.65 mEq) 0 4.65 4.65 4.65 4.65   Mag Sulfate (mEq)  (1 g = 8.12 mEq) 16.24 16.24 16.24 16.24 12.18   Multivitamin (mL) 10 10 10 0 10   Trace Elements (mL) 1 1 1 1 1      Summary of changes for tonight's TPN:   No macronutrient changes per dietary  Decrease Magnesium Sulfate to 12.18 mEq (1.5 g)  Add MVI (shortage- only MWF)    Electrolyte Replacement:   none    Monitoring:  BMP, Mag, iCal, & Phos ordered for tomorrow with AM labs.    Pharmacy will continue to follow daily. Thank you for the consult.  Julianne Ruano, PharmD 10/4/2024 7:01 AM

## 2024-10-04 NOTE — PROGRESS NOTES
observed.    Retroperitoneum / lymph nodes: The aorta is not dilated. No lymphadenopathy is  observed.    Pelvis: Uterus is absent.    Musculoskeletal: Multilevel degenerative disc disease is observed. Diffuse  osteopenia is present. The visualized skeletal structures appear intact.    Impression  1. Colonic diverticulosis with pericolonic fat stranding adjacent to the mid  sigmoid colon suggests diverticulitis. No bowel obstruction is identified. No  free air is observed. Ill-defined pockets of fluid along the lateral margin of  the sigmoid colon measure up to 23 mm in diameter (series 301, image 61)  suspicious for peridiverticular abscess. The fat stranding surrounding the  sigmoid colon also surrounds the urinary bladder and the urinary bladder wall  appears thickened which could indicate infectious/inflammatory cystitis  secondary to diverticulitis. Correlate with urinalysis. A somewhat elongated  collection of fluid in the midline (series 301, image 68) may represent a second  peridiverticular abscess. It appears to extend along the post hysterectomy  portion of the pelvis. Continued follow-up to ensure resolution is advised.    2. Chronic findings are discussed.      **This report has been created using voice recognition software.  It may contain  minor errors which are inherent in voice recognition technology.**    Electronically signed by Dr. Kenia Sesay    No results found for this or any previous visit.    No results found for this or any previous visit.      Endoscopy Finding:       [place] [address]                              PROCEDURE NOTE        PATIENT NAME:        LETY GAYTAN          :         1955  MED REC NO:            [id number]                       ROOM:      [facility]  ACCOUNT NO:           [id number]                       ADMIT DATE:       2024  PROVIDER:Yadira Dumont MD        DATE OF PROCEDURE:

## 2024-10-04 NOTE — PLAN OF CARE
Problem: Chronic Conditions and Co-morbidities  Goal: Patient's chronic conditions and co-morbidity symptoms are monitored and maintained or improved  Outcome: Progressing  Flowsheets (Taken 10/4/2024 0154)  Care Plan - Patient's Chronic Conditions and Co-Morbidity Symptoms are Monitored and Maintained or Improved: Monitor and assess patient's chronic conditions and comorbid symptoms for stability, deterioration, or improvement     Problem: Pain  Goal: Verbalizes/displays adequate comfort level or baseline comfort level  Outcome: Progressing  Flowsheets (Taken 10/4/2024 0154)  Verbalizes/displays adequate comfort level or baseline comfort level:   Encourage patient to monitor pain and request assistance   Assess pain using appropriate pain scale   Administer analgesics based on type and severity of pain and evaluate response     Problem: Safety - Adult  Goal: Free from fall injury  Outcome: Progressing  Flowsheets (Taken 10/4/2024 0154)  Free From Fall Injury: Instruct family/caregiver on patient safety  Note: Patient bed alarm is on at this time.     Problem: Skin/Tissue Integrity  Goal: Absence of new skin breakdown  Description: 1.  Monitor for areas of redness and/or skin breakdown  2.  Assess vascular access sites hourly  3.  Every 4-6 hours minimum:  Change oxygen saturation probe site  4.  Every 4-6 hours:  If on nasal continuous positive airway pressure, respiratory therapy assess nares and determine need for appliance change or resting period.  Outcome: Progressing  Note: Patient is able to reposition self.     Problem: Gastrointestinal - Adult  Goal: Minimal or absence of nausea and vomiting  Outcome: Progressing  Flowsheets (Taken 10/4/2024 0154)  Minimal or absence of nausea and vomiting:   Administer IV fluids as ordered to ensure adequate hydration   Maintain NPO status until nausea and vomiting are resolved  Goal: Maintains or returns to baseline bowel function  Outcome: Progressing  Flowsheets  (Taken 10/4/2024 0154)  Maintains or returns to baseline bowel function:   Assess bowel function   Encourage oral fluids to ensure adequate hydration     Problem: Discharge Planning  Goal: Discharge to home or other facility with appropriate resources  Outcome: Progressing  Flowsheets (Taken 10/4/2024 0154)  Discharge to home or other facility with appropriate resources:   Identify barriers to discharge with patient and caregiver   Identify discharge learning needs (meds, wound care, etc)     Problem: Nutrition Deficit:  Goal: Optimize nutritional status  Outcome: Progressing  Flowsheets (Taken 10/4/2024 0154)  Nutrient intake appropriate for improving, restoring, or maintaining nutritional needs:   Assess nutritional status and recommend course of action   Monitor oral intake, labs, and treatment plans     Care plan reviewed with patient.  Patient verbalizes understanding of the plan of care and contributes to goal setting.

## 2024-10-04 NOTE — PROGRESS NOTES
rate and rhythm with normal S1/S2 without murmurs, rubs or gallops.  Abdomen: Soft, obese, non-tender, non-distended with normal bowel sounds. Incisions c/d/I. ALDEN intact, minimal drainage.   Musculoskeletal: passive and active ROM x 4 extremities.  Skin: Skin color, texture, turgor normal.  No rashes or lesions.  Neurologic:  Neurovascularly intact without any focal sensory/motor deficits. Cranial nerves: II-XII intact, grossly non-focal.  Psychiatric: Alert and oriented, thought content appropriate, normal insight  Capillary Refill: Brisk,< 3 seconds   Peripheral Pulses: +2 palpable, equal bilaterally       Labs/Radiology: See chart or assessment above.     Electronically signed by ALETHEA Howard on 10/4/2024 at 12:52 PM

## 2024-10-04 NOTE — PROGRESS NOTES
[facility]  INPATIENT PHYSICAL THERAPY  DAILY NOTE  STRZ ONC MED 5K - 5K-11/011-A      Discharge Recommendations: Continue to assess pending progress and Patient would benefit from continued PT at discharge  Equipment Recommendations: No (has RW)               Time In: 0845  Time Out: 09  Timed Code Treatment Minutes: 33 Minutes  Minutes: 33          Date: 10/4/2024  Patient Name: Baylee Irizarry,  Gender:  female        MRN: [id number]  : 1955  (69 y.o.)     Referring Practitioner: Jenna Wilcox PA  Diagnosis: Diverticulitis  Additional Pertinent Hx: Per EMR:Baylee Irizarry is a 69 y.o. female with PMHx of Anemia, Anxiety, Asthma, CHF, COPD, MDD, DM2, HA, Lupus, HTN, Obesity, RIRI who presents to [facility] ED with CC:1 month emesis and difficulty eating. On  given potassium at [facility], PCP advised her to come in. C/o abdominal soreness. Pain 7/10,   [facility] said thyroid problem. Denied fever no chills, feels cold. Denies urinary complaints,   On interview today patient states that she has been unable to keep much down for the past month.  She says pretty much all she can take in is 7 up.  She has been unable to take medications.  As such presented with BP significantly elevated.  Endorses nausea, vomiting, says she is always cold but denies fever or chills, she has no urinary complaints but does endorse that she has had frequent UTIs in the last 6 months.  Patient goes to [facility] for most care.  Denies CP but does endorse palpitations which happen occasionally.  Nausea and vomiting is her biggest complaint at this time. Pt underwent EGD on  with showed gastritis. Per notes,Patient with Pericolonic fluid collection, possible abscess, diverticulitis. s/p ROBOTIC DRAINAGE PELVIC ABSCESS (Abdomen) 10/3     Prior Level of Function:  Lives With: Significant other (ex-)  Type of Home: House  Home Layout: One level  Home Access: Ramped entrance (Pt reports ramp is not very stable)  Home

## 2024-10-04 NOTE — PROGRESS NOTES
Comprehensive Nutrition Assessment    Type and Reason for Visit:  Reassess    Nutrition Recommendations/Plan:   Recommend continue current TPN macronutrient - 20 kcals/kgm; continue with 1.5 gm protein/kgm and 30% kcals from lipids; dosing wt: 70 kgm (goal).  Recommend advance diet as GI function allows.  Will resume Ensure Clear TID, consider change to Glucerna ONS TID once FL diet or greater.  Recommend continue PN until tolerance of FL diet or greater.  Will monitor need for additional nutrition interventions, diet education.     Malnutrition Assessment:  Malnutrition Status:  Moderate malnutrition (09/28/24 1303)    Context:  Chronic Illness     Findings of the 6 clinical characteristics of malnutrition:  Energy Intake:  75% or less estimated energy requirements for 1 month or longer (Poor PO for the last ~1.5-2 months; on going N/V)  Weight Loss:   (reports losing ~100# in the last year unintentionally; 40-50# recently per pt recall; states she was recently in 300s and now 282# per rough estimate bedscale wt today; note edema and hx/o CHF as well)     Body Fat Loss:  No significant body fat loss     Muscle Mass Loss:  No significant muscle mass loss    Fluid Accumulation:  Mild Generalized   Strength:  Not Performed    Nutrition Assessment:     Remains at risk for further nutritional compromise r/t altered GI function; admit with N/V ongoing for last month or two, hypokalemia 2/2 GI losses, UTI, pericolonic fluid collection - possible abscess - diverticulitis, suspected gastroparesis, leukocytosis suspected d/t N/V, gastritis and underlying medical condition (PMHx: anemia, anxiety, CHF, COPD, T2DM, HTN, lupus, obesity, RIRI, OA, former smoker).     Nutrition Related Findings:    Pt. Report/Treatments/Miscellaneous:   10/4- pt. Seen - states sipping on broth, denies any nausea; reports belly is sore w/ movement but not with eating; agrees to receive Ensure Clear again (consumed earlier in LOS); 10/3 -s/p  robotic assisted drainage of pelvis abscess from perforated diverticulitis; + drain placement  10/3- pt. Seen - states feeling a little worse today; reports increase in pain; denies any nausea or vomiting; states stomach is growling, \"gurgly\" reports consumed broth, crackers, jello and juice yesterday; MD noting ? laparoscopic approach to drainage to intervene on abscesses  10/2- pt. Seen - reports her belly is \"quieting down\"; denies any nausea or abdominal pain; CL diet is staying down; reports passing gas; states acceptance of Ensure Clear; agrees to trial Glucerna ONS once FL diet; discussed PN w/ pt; provided pt. With warm blankets as reports feeling cold  10/1- Pt seen this morning during breakfast. Pt tolerating sips and bites of broth and Jello and is tolerating it well. Pt reports ongoing nausea and emesis with lots of flatus today. Pt states the Zofran and Compazine PRN help with the nausea. Pt amenable to try Ensure Clear TID. SLP seen pt this morning and recommend a Regular diet with Thin liquids when okay with General Surgery. EGD from 9/30 showed gastritis- additional medications ordered.  9/30- pt. Seen - PN off as peripheral line infiltrated; s/p PICC line placement - awaiting confirmation of placement; reports +N/V, burping; denies passing any gas; plan for EGD today; SLP evaluation pending; s/p UGI 9/29-Oral contrast did pass into proximal small bowel loops. Initially, there was some delayed passage through the distal esophagus and stomach  9/28-Pt seen - closing eyes throughout conversation, c/o N/V and inability for PO intake for the last 1.5-2 months. She states that she has gone down 5 pant sizes and feels that most recently her N/V resulted in 40-50# weight loss. Pt aware of plan for PN initiation d/t GI function at this time; pt declining PPN start until PICC placed - educated pt on the difference and pt still not in agreement until PICC placed. Encouraged pt to think about it.   GI Status:

## 2024-10-04 NOTE — PROGRESS NOTES
Hourly rounding continued. Patient is resting in bed quietly. Bed is in lowest position with bed alarm on. Call light within reach.

## 2024-10-04 NOTE — PROGRESS NOTES
SBIRT screening completed per trauma protocol. SBIRT Findings are Negative.  Patient denies alcohol and/or drug use. Also denies depressive symptoms.    Brief Intervention and Referral to Treatment Summary N/A    Patient was provided PHQ-9, AUDIT-C and DAST Screening:      PHQ-9 Score:  Negative  AUDIT-C Score:  Negative  DAST Score:   Negative    Patient’s substance use is considered-Negative    Low Risk/Healthy  Moderate Risk  Harmful  Dependent    Patient’s depression is considered:-Negative    Minimal  Mild   Moderate  Moderately Severe  Severe    Brief Education Was Provided N/A    Patient was receptive  Patient was not receptive      Brief Intervention Is Provided (Only for AUDIT-C or DAST) N/A    Patient reports readiness to decrease and/or stop use and a plan was discussed   Patient denies readiness to decrease and/or stop use and a plan was not discussed    Injured Trauma Survivor Screening  1.  When you were injured or right afterward   Did you think you were going to die? RESPONSES; YES+1/NO: NO  Do you think this was done to you intentionally? NO    Since your injury  Have you felt more restless, tense or jumpy than usual? RESPONSES; YES+1/NO: NO  Have you found yourself unable to stop worrying? RESPONSES; YES+1/NO: NO  Do you find yourself thinking that the world is unsafe and that people are not to be trusted? RESPONSES; YES+1/NO: NO    TOTAL SCORE from ITSS Questions 1 and 2:   0  NOTE: A score of greater than or equal to 2 is considered positive for PTSD risk and is to receive a community resource packet to link with appropriate providers.    Recommendations/Referrals for Brief and/or Specialized Treatment Provided to Patient  N/A

## 2024-10-04 NOTE — PROGRESS NOTES
[facility]  OCCUPATIONAL THERAPY MISSED TREATMENT NOTE  STR ONC MED 5K  5K-11/011-A      Date: 10/4/2024  Patient Name: Baylee Irizarry        CSN: [id number]   : 1955  (69 y.o.)  Gender: female                REASON FOR MISSED TREATMENT: Patient Refused.  ; first attempt to eval patient with patient working with PT. Second attempt to eval patient with patient supine in bed with family member/spouse (?) present and patient refusing OOB activity stating she already had therapy with this writer edu in difference b/t PT and OT with visitor encouraging patient. Patient continuing to refuse stating, \"the doctor was just in and he told me I was supposed to rest in bed the rest of the day, that is my goal\". OT to check back another date.

## 2024-10-04 NOTE — PROGRESS NOTES
[facility]  General Surgery Postoperative Progress Note    Pt Name: Baylee Irizarry  Medical Record Number: [id number]  Date of Birth 1955   Today's Date: 10/4/2024    CC:  Chief Complaint   Patient presents with    Fatigue    Emesis       ASSESSMENT   HD # 8  POD#1 status post robotic assisted drainage of pelvic abscess 10/3/2024  EGD showed gastritis, additional meds ordered  PLAN   Continue drain manage   No further surgical plans at this time  Drain likely for several days and then remove  From my standpoint when oral intake is adequate and she is medically stable she will be discharged from my standpoint  Needs nutritional rebuilding to also help immune function  SUBJECTIVE   Baylee is doing ok.  In bed this morning.  Drain had 170 out its serosanguineous mostly serous.  Not much p.o. recorded yesterday but she was n.p.o. for surgery.  CURRENT MEDICATIONS   Scheduled Meds:   triamcinolone   Topical BID    sucralfate  1 g Oral 4x Daily AC & HS    pantoprazole (PROTONIX) 40 mg in sodium chloride (PF) 0.9 % 10 mL injection  40 mg IntraVENous Daily    piperacillin-tazobactam  4,500 mg IntraVENous q8h    metoclopramide  10 mg IntraVENous Q6H    sodium chloride flush  5-40 mL IntraVENous 2 times per day    diclofenac sodium  2 g Topical BID    hydroxychloroquine  200 mg Oral Daily    lisinopril  40 mg Oral Daily    montelukast  10 mg Oral Nightly    methIMAzole  10 mg Oral TID    enoxaparin  30 mg SubCUTAneous BID    insulin lispro  0-4 Units SubCUTAneous Q6H     Continuous Infusions:   PN-Adult  3 IN 1 Central Line (Custom) 90 mL/hr at 10/03/24 1828    sodium chloride 25 mL/hr at 10/02/24 0958    dextrose       PRN Meds:.HYDROmorphone **OR** HYDROmorphone, promethazine, hydrALAZINE, calcium carbonate, sodium chloride flush, sodium chloride, potassium chloride **OR** potassium alternative oral replacement **OR** potassium chloride, magnesium sulfate, ondansetron **OR**  with urinalysis. A somewhat elongated   collection of fluid in the midline (series 301, image 68) may represent a second   peridiverticular abscess. It appears to extend along the post hysterectomy   portion of the pelvis. Continued follow-up to ensure resolution is advised.      2. Chronic findings are discussed.         **This report has been created using voice recognition software.  It may contain   minor errors which are inherent in voice recognition technology.**      Electronically signed by Dr. Kenia Sesay        XR CHEST (2 VW)    Result Date: 9/27/2024  Impression: Chronic bronchitis versus viral bronchiolitis subtly suggested in the upper lobes predominantly see above for discussion. This document has been electronically signed by: Eduardo Pedroza III, MD on 09/27/2024 03:20 AM    CT ABDOMEN PELVIS W IV CONTRAST Additional Contrast? None    Result Date: 9/26/2024  1. Colonic diverticulosis with pericolonic fat stranding adjacent to the mid sigmoid colon suggests diverticulitis. No bowel obstruction is identified. No free air is observed. Ill-defined pockets of fluid along the lateral margin of the sigmoid colon measure up to 23 mm in diameter (series 301, image 61) suspicious for peridiverticular abscess. The fat stranding surrounding the sigmoid colon also surrounds the urinary bladder and the urinary bladder wall appears thickened which could indicate infectious/inflammatory cystitis secondary to diverticulitis. Correlate with urinalysis. A somewhat elongated collection of fluid in the midline (series 301, image 68) may represent a second peridiverticular abscess. It appears to extend along the post hysterectomy portion of the pelvis. Continued follow-up to ensure resolution is advised. 2. Chronic findings are discussed. **This report has been created using voice recognition software.  It may contain minor errors which are inherent in voice recognition technology.** Electronically signed by

## 2024-10-04 NOTE — PLAN OF CARE
Problem: Chronic Conditions and Co-morbidities  Goal: Patient's chronic conditions and co-morbidity symptoms are monitored and maintained or improved  10/4/2024 1119 by Ivanna White RN  Outcome: Progressing  Flowsheets (Taken 10/4/2024 0800)  Care Plan - Patient's Chronic Conditions and Co-Morbidity Symptoms are Monitored and Maintained or Improved:   Monitor and assess patient's chronic conditions and comorbid symptoms for stability, deterioration, or improvement   Collaborate with multidisciplinary team to address chronic and comorbid conditions and prevent exacerbation or deterioration     Problem: Pain  Goal: Verbalizes/displays adequate comfort level or baseline comfort level  10/4/2024 1119 by Ivanna White RN  Outcome: Progressing  Flowsheets (Taken 10/4/2024 0800)  Verbalizes/displays adequate comfort level or baseline comfort level:   Encourage patient to monitor pain and request assistance   Assess pain using appropriate pain scale   Administer analgesics based on type and severity of pain and evaluate response   Implement non-pharmacological measures as appropriate and evaluate response   Pain Assessment: 0-10  Pain Level: 7   Patient's Stated Pain Goal: 0 - No pain   Is pain goal met at this time?  No     Non-Pharmaceutical Pain Intervention(s): Rest, Distraction    Problem: Safety - Adult  Goal: Free from fall injury  10/4/2024 1119 by Ivanna White RN  Outcome: Progressing   Fall assessment completed. Patient using call light appropriately to call for assistance with ambulation to bathroom. Personal items within reach. Patient is also compliant with use of non-skid slippers.     Problem: Skin/Tissue Integrity  Goal: Absence of new skin breakdown  Description: 1.  Monitor for areas of redness and/or skin breakdown  2.  Assess vascular access sites hourly  3.  Every 4-6 hours minimum:  Change oxygen saturation probe site  4.  Every 4-6 hours:  If on nasal continuous positive airway pressure,

## 2024-10-04 NOTE — PROGRESS NOTES
[facility] Infectious Disease         Progress Note      Baylee Irizarry  MEDICAL RECORD NUMBER:  [id number]  AGE: 69 y.o.   GENDER: female  : 1955  EPISODE DATE:  2024      Assessment:     Pt is a 69yof who I am consulted for intra-abdominal abscess. Currently on therapy with pip/tazo.      The fluid collections in the abdomen near mid line pelvic as well as mid sigmoid have both increased in size. The sigmoid collection is fairly small and the location of the midline collection is fairly close in proximity to bladder. They both may be difficult to access. While on empiric therapy, the size has increased which is concerning for 3 possibilities. 1. The collection is not being penetrated by antibiotics (most likely). 2. The organisms present in the collection are discordant (resistant) to antibiotic coverage (alternative may be ceftriaxone or FQ paired with clarke). The issue should not be coverage in my opinion as this regimen should adequately cover most of the enterobacteriaceae and anaerobes that would be involved.      - Continue pip/tazo  - Discussed findings during surgery with general surgery, overall unremarkable appearing collections (intra-op cultures obtained)  - Would treat with 4-5 days of antibiotics following surgical drainage procedure (ideally would like to transition to unasyn)      Subjective:     Chief Complaint   Patient presents with    Fatigue    Emesis         Pt resting comfortably in bed. She has no complaints or concerns this morning other than limited diet. She underwent surgical drainage on 10/3. Discussed further with surgeon.     Patient Active Problem List   Diagnosis Code    Controlled diabetes mellitus type 2 with complications (HCC) E11.8    HTN (hypertension) I10    Microcytic anemia D50.9    Severe persistent asthma J45.50    Diverticulitis K57.92    Hyperthyroidism E05.90    Morbid obesity E66.01    Diverticulitis of large intestine with abscess K57.20     packs/day: 0.00     Types: Cigarettes     Quit date: 1985     Years since quittin.1   Substance Use Topics    Alcohol use: No     Alcohol/week: 0.0 standard drinks of alcohol    Drug use: No       ALLERGIES    Allergies   Allergen Reactions    Latex     Symbicort [Budesonide-Formoterol Fumarate] Shortness Of Breath and Swelling    Cephalexin     Keflex [Cephalexin]     Levofloxacin     Percocet [Oxycodone-Acetaminophen] Other (See Comments)     Blood in urine        MEDICATIONS    No current facility-administered medications on file prior to encounter.     Current Outpatient Medications on File Prior to Encounter   Medication Sig Dispense Refill    hydroxychloroquine (PLAQUENIL) 200 MG tablet Take by mouth daily      RA ALLERGY RELIEF 10 MG tablet       insulin aspart (NOVOLOG FLEXPEN) 100 UNIT/ML injection pen Inject into the skin 3 times daily (before meals)      desoximetasone (TOPICORT) 0.25 % cream Apply topically 2 times daily Apply topically 2 times daily.      diclofenac sodium 1 % GEL Apply 2 g topically 2 times daily      mometasone-formoterol (DULERA) 100-5 MCG/ACT inhaler Inhale 2 puffs into the lungs 2 times daily      albuterol (ACCUNEB) 0.63 MG/3ML nebulizer solution Take 3 mLs by nebulization every 6 hours as needed      albuterol (PROVENTIL) (2.5 MG/3ML) 0.083% nebulizer solution Take 3 mLs by nebulization every 6 hours as needed      montelukast (SINGULAIR) 10 MG tablet Take 1 tablet by mouth nightly      lisinopril (PRINIVIL;ZESTRIL) 20 MG tablet Take 2 tablets by mouth daily      amoxicillin (AMOXIL) 250 MG/5ML suspension Take by mouth 3 times daily      hydrochlorothiazide (HYDRODIURIL) 12.5 MG tablet       mupirocin (BACTROBAN) 2 % ointment Apply topically 3 times daily Apply topically 3 times daily.         REVIEW OF SYSTEMS    Constitutional: no fever, no night sweats, no fatigue.  Head: no head ache , no head injury, no migranes.  Eye: no blurring of vision, no double

## 2024-10-04 NOTE — PROGRESS NOTES
Report received from primary RN. Temp 97.8 RR 18 HR 95 /74 SpO2 100% RA Pain Level 6, Notified Primary RN    Patient is Alert and Oriented x4. Speech is clear and appropriate. Pupils equal, round, and responsive to light. Pupil size is from a 4mm to a 2mm with consensual response. Oral cavity is clear. Skin is pink, warm, and dry. Capillary refill less than 3 seconds. Skin turgor is less than 3 seconds. Hand Grasp is equal and strong bilaterally. Regular breathing pattern, symmetrical chest movement, lung sounds clear throughout. PICC line is clean, dry, and intact. Upper and lower extremity movement is present. Abdomen is non-distended, non-tender with normal bowel sounds in all four quadrants. Puncture sites noted, no redness or swelling. ALDEN drain is clean, dry, draining, and intact. Pedal push and pull is strong and equal.     Patient assisted from chair to bed with walker. She is resting in bed quietly. Bed is in lowest position with bed alarm on. Call light within reach.

## 2024-10-05 LAB
ANION GAP SERPL CALC-SCNC: 9 MEQ/L (ref 8–16)
BASOPHILS ABSOLUTE: 0 THOU/MM3 (ref 0–0.1)
BASOPHILS NFR BLD AUTO: 0.3 %
BUN SERPL-MCNC: 21 MG/DL (ref 7–22)
CA-I BLD ISE-SCNC: 1.23 MMOL/L (ref 1.12–1.32)
CALCIUM SERPL-MCNC: 8.3 MG/DL (ref 8.5–10.5)
CHLORIDE SERPL-SCNC: 101 MEQ/L (ref 98–111)
CO2 SERPL-SCNC: 28 MEQ/L (ref 23–33)
CREAT SERPL-MCNC: 0.4 MG/DL (ref 0.4–1.2)
DEPRECATED RDW RBC AUTO: 40.5 FL (ref 35–45)
EOSINOPHIL NFR BLD AUTO: 0 %
EOSINOPHILS ABSOLUTE: 0 THOU/MM3 (ref 0–0.4)
ERYTHROCYTE [DISTWIDTH] IN BLOOD BY AUTOMATED COUNT: 14.4 % (ref 11.5–14.5)
GFR SERPL CREATININE-BSD FRML MDRD: > 90 ML/MIN/1.73M2
GLUCOSE BLD STRIP.AUTO-MCNC: 117 MG/DL (ref 70–108)
GLUCOSE BLD STRIP.AUTO-MCNC: 118 MG/DL (ref 70–108)
GLUCOSE BLD STRIP.AUTO-MCNC: 119 MG/DL (ref 70–108)
GLUCOSE BLD STRIP.AUTO-MCNC: 122 MG/DL (ref 70–108)
GLUCOSE SERPL-MCNC: 110 MG/DL (ref 70–108)
HCT VFR BLD AUTO: 28.3 % (ref 37–47)
HGB BLD-MCNC: 8.6 GM/DL (ref 12–16)
IMM GRANULOCYTES # BLD AUTO: 0.17 THOU/MM3 (ref 0–0.07)
IMM GRANULOCYTES NFR BLD AUTO: 1.5 %
LYMPHOCYTES ABSOLUTE: 3 THOU/MM3 (ref 1–4.8)
LYMPHOCYTES NFR BLD AUTO: 27.3 %
MAGNESIUM SERPL-MCNC: 1.9 MG/DL (ref 1.6–2.4)
MCH RBC QN AUTO: 24 PG (ref 26–33)
MCHC RBC AUTO-ENTMCNC: 30.4 GM/DL (ref 32.2–35.5)
MCV RBC AUTO: 78.8 FL (ref 81–99)
MONOCYTES ABSOLUTE: 1.2 THOU/MM3 (ref 0.4–1.3)
MONOCYTES NFR BLD AUTO: 11 %
NEUTROPHILS ABSOLUTE: 6.6 THOU/MM3 (ref 1.8–7.7)
NEUTROPHILS NFR BLD AUTO: 59.9 %
NRBC BLD AUTO-RTO: 0 /100 WBC
PHOSPHATE SERPL-MCNC: 3.5 MG/DL (ref 2.4–4.7)
PLATELET # BLD AUTO: 263 THOU/MM3 (ref 130–400)
PMV BLD AUTO: 13 FL (ref 9.4–12.4)
POTASSIUM SERPL-SCNC: 4.4 MEQ/L (ref 3.5–5.2)
RBC # BLD AUTO: 3.59 MILL/MM3 (ref 4.2–5.4)
SODIUM SERPL-SCNC: 138 MEQ/L (ref 135–145)
WBC # BLD AUTO: 11.1 THOU/MM3 (ref 4.8–10.8)

## 2024-10-05 PROCEDURE — 2580000003 HC RX 258: Performed by: STUDENT IN AN ORGANIZED HEALTH CARE EDUCATION/TRAINING PROGRAM

## 2024-10-05 PROCEDURE — 99233 SBSQ HOSP IP/OBS HIGH 50: CPT | Performed by: STUDENT IN AN ORGANIZED HEALTH CARE EDUCATION/TRAINING PROGRAM

## 2024-10-05 PROCEDURE — 6370000000 HC RX 637 (ALT 250 FOR IP): Performed by: SURGERY

## 2024-10-05 PROCEDURE — 99232 SBSQ HOSP IP/OBS MODERATE 35: CPT | Performed by: INTERNAL MEDICINE

## 2024-10-05 PROCEDURE — 6360000002 HC RX W HCPCS: Performed by: SURGERY

## 2024-10-05 PROCEDURE — 85025 COMPLETE CBC W/AUTO DIFF WBC: CPT

## 2024-10-05 PROCEDURE — 36415 COLL VENOUS BLD VENIPUNCTURE: CPT

## 2024-10-05 PROCEDURE — 99024 POSTOP FOLLOW-UP VISIT: CPT | Performed by: SURGERY

## 2024-10-05 PROCEDURE — 6360000002 HC RX W HCPCS: Performed by: NURSE PRACTITIONER

## 2024-10-05 PROCEDURE — 2580000003 HC RX 258: Performed by: SURGERY

## 2024-10-05 PROCEDURE — 84100 ASSAY OF PHOSPHORUS: CPT

## 2024-10-05 PROCEDURE — 2500000003 HC RX 250 WO HCPCS: Performed by: NURSE PRACTITIONER

## 2024-10-05 PROCEDURE — 83735 ASSAY OF MAGNESIUM: CPT

## 2024-10-05 PROCEDURE — 2580000003 HC RX 258: Performed by: NURSE PRACTITIONER

## 2024-10-05 PROCEDURE — 82330 ASSAY OF CALCIUM: CPT

## 2024-10-05 PROCEDURE — 80048 BASIC METABOLIC PNL TOTAL CA: CPT

## 2024-10-05 PROCEDURE — 97166 OT EVAL MOD COMPLEX 45 MIN: CPT

## 2024-10-05 PROCEDURE — 82948 REAGENT STRIP/BLOOD GLUCOSE: CPT

## 2024-10-05 PROCEDURE — 97535 SELF CARE MNGMENT TRAINING: CPT

## 2024-10-05 PROCEDURE — 6360000002 HC RX W HCPCS: Performed by: STUDENT IN AN ORGANIZED HEALTH CARE EDUCATION/TRAINING PROGRAM

## 2024-10-05 PROCEDURE — 1200000000 HC SEMI PRIVATE

## 2024-10-05 RX ADMIN — PANTOPRAZOLE SODIUM 40 MG: 40 INJECTION, POWDER, FOR SOLUTION INTRAVENOUS at 08:42

## 2024-10-05 RX ADMIN — METHIMAZOLE 10 MG: 10 TABLET ORAL at 20:06

## 2024-10-05 RX ADMIN — METOCLOPRAMIDE 10 MG: 5 INJECTION, SOLUTION INTRAMUSCULAR; INTRAVENOUS at 17:27

## 2024-10-05 RX ADMIN — MONTELUKAST 10 MG: 10 TABLET, FILM COATED ORAL at 20:06

## 2024-10-05 RX ADMIN — AMPICILLIN SODIUM AND SULBACTAM SODIUM 3000 MG: 2; 1 INJECTION, POWDER, FOR SOLUTION INTRAMUSCULAR; INTRAVENOUS at 08:58

## 2024-10-05 RX ADMIN — ENOXAPARIN SODIUM 30 MG: 100 INJECTION SUBCUTANEOUS at 20:04

## 2024-10-05 RX ADMIN — SODIUM CHLORIDE, PRESERVATIVE FREE 10 ML: 5 INJECTION INTRAVENOUS at 20:07

## 2024-10-05 RX ADMIN — ENOXAPARIN SODIUM 30 MG: 100 INJECTION SUBCUTANEOUS at 08:43

## 2024-10-05 RX ADMIN — LISINOPRIL 40 MG: 40 TABLET ORAL at 08:43

## 2024-10-05 RX ADMIN — METOCLOPRAMIDE 10 MG: 5 INJECTION, SOLUTION INTRAMUSCULAR; INTRAVENOUS at 11:46

## 2024-10-05 RX ADMIN — METHIMAZOLE 10 MG: 10 TABLET ORAL at 08:46

## 2024-10-05 RX ADMIN — PIPERACILLIN AND TAZOBACTAM 4500 MG: 4; .5 INJECTION, POWDER, FOR SOLUTION INTRAVENOUS at 03:35

## 2024-10-05 RX ADMIN — SODIUM CHLORIDE, PRESERVATIVE FREE 10 ML: 5 INJECTION INTRAVENOUS at 08:42

## 2024-10-05 RX ADMIN — AMPICILLIN SODIUM AND SULBACTAM SODIUM 3000 MG: 2; 1 INJECTION, POWDER, FOR SOLUTION INTRAMUSCULAR; INTRAVENOUS at 21:29

## 2024-10-05 RX ADMIN — CALCIUM GLUCONATE: 98 INJECTION, SOLUTION INTRAVENOUS at 17:26

## 2024-10-05 RX ADMIN — AMPICILLIN SODIUM AND SULBACTAM SODIUM 3000 MG: 2; 1 INJECTION, POWDER, FOR SOLUTION INTRAMUSCULAR; INTRAVENOUS at 14:33

## 2024-10-05 RX ADMIN — ONDANSETRON 4 MG: 2 INJECTION INTRAMUSCULAR; INTRAVENOUS at 20:20

## 2024-10-05 RX ADMIN — METHIMAZOLE 10 MG: 10 TABLET ORAL at 14:32

## 2024-10-05 RX ADMIN — TRIAMCINOLONE ACETONIDE: 1 CREAM TOPICAL at 08:43

## 2024-10-05 RX ADMIN — TRIAMCINOLONE ACETONIDE: 1 CREAM TOPICAL at 20:05

## 2024-10-05 RX ADMIN — METOCLOPRAMIDE 10 MG: 5 INJECTION, SOLUTION INTRAMUSCULAR; INTRAVENOUS at 06:22

## 2024-10-05 ASSESSMENT — PAIN SCALES - GENERAL
PAINLEVEL_OUTOF10: 7
PAINLEVEL_OUTOF10: 0

## 2024-10-05 ASSESSMENT — PAIN DESCRIPTION - LOCATION: LOCATION: ABDOMEN

## 2024-10-05 ASSESSMENT — PAIN DESCRIPTION - DESCRIPTORS: DESCRIPTORS: SORE

## 2024-10-05 ASSESSMENT — PAIN DESCRIPTION - FREQUENCY: FREQUENCY: CONTINUOUS

## 2024-10-05 ASSESSMENT — PAIN - FUNCTIONAL ASSESSMENT: PAIN_FUNCTIONAL_ASSESSMENT: ACTIVITIES ARE NOT PREVENTED

## 2024-10-05 ASSESSMENT — PAIN DESCRIPTION - ONSET: ONSET: ON-GOING

## 2024-10-05 ASSESSMENT — PAIN SCALES - WONG BAKER: WONGBAKER_NUMERICALRESPONSE: NO HURT

## 2024-10-05 ASSESSMENT — PAIN DESCRIPTION - PAIN TYPE: TYPE: SURGICAL PAIN

## 2024-10-05 NOTE — PLAN OF CARE
Problem: Chronic Conditions and Co-morbidities  Goal: Patient's chronic conditions and co-morbidity symptoms are monitored and maintained or improved  10/1/2024 1045 by Bruna Bo RN  Outcome: Progressing   Patient's chronic conditions and co-morbidity symptoms are monitored and maintained.     Problem: Pain  Goal: Verbalizes/displays adequate comfort level or baseline comfort level  10/1/2024 1045 by Bruna Bo, RN  Outcome: Progressing  Patient states pain relief from PRN pain medications. Pain reassessed one hour post PRN pain medication given.  Patient rates pain 3 on MINOO 0-10 scale.     Problem: Safety - Adult  Goal: Free from fall injury  10/1/2024 1045 by Bruna Bo RN  Outcome: Progressing  Fall assessment completed. Patient using call light appropriately to call for assistance with ambulation to bathroom.  Personal items within reach. Patient is also compliant with use of non-skid slippers.      Problem: Skin/Tissue Integrity  Goal: Absence of new skin breakdown  Description: 1.  Monitor for areas of redness and/or skin breakdown  2.  Assess vascular access sites hourly  3.  Every 4-6 hours minimum:  Change oxygen saturation probe site  4.  Every 4-6 hours:  If on nasal continuous positive airway pressure, respiratory therapy assess nares and determine need for appliance change or resting period.  10/1/2024 1045 by Bruna Bo RN  Outcome: Progressing  No skin breakdown this shift. Patient being assisted with turning. Patients states understanding of repositioning every two hours.     Problem: Gastrointestinal - Adult  Goal: Minimal or absence of nausea and vomiting  10/1/2024 1045 by Bruna Bo, RN  Outcome: Progressing  Nausea at times. Patient taking compazine and zofran for nausea control.     Problem: Gastrointestinal - Adult  Goal: Maintains or returns to baseline bowel function  10/1/2024 1045 by Bruna Bo RN  Outcome:

## 2024-10-05 NOTE — PLAN OF CARE
Problem: Chronic Conditions and Co-morbidities  Goal: Patient's chronic conditions and co-morbidity symptoms are monitored and maintained or improved  10/4/2024 2323 by Nessa Taveras RN  Outcome: Progressing     Problem: Pain  Goal: Verbalizes/displays adequate comfort level or baseline comfort level  10/4/2024 2323 by Nessa Taveras RN  Outcome: Progressing   Pain Assessment: None - Denies Pain  Pain Level: 4   Patient's Stated Pain Goal: 0 - No pain   Is pain goal met at this time?  Yes     Non-Pharmaceutical Pain Intervention(s): Rest, Environmental changes, Distraction    Problem: Safety - Adult  Goal: Free from fall injury  10/4/2024 2323 by Nessa Taveras RN  Outcome: Progressing   All fall precautions in place. Bed in low position, alarm activated and appropriate use of call light.     Problem: Skin/Tissue Integrity  Goal: Absence of new skin breakdown  Description: 1.  Monitor for areas of redness and/or skin breakdown  2.  Assess vascular access sites hourly  3.  Every 4-6 hours minimum:  Change oxygen saturation probe site  4.  Every 4-6 hours:  If on nasal continuous positive airway pressure, respiratory therapy assess nares and determine need for appliance change or resting period.  10/4/2024 2323 by Nessa Taveras RN  Outcome: Progressing     Problem: Gastrointestinal - Adult  Goal: Minimal or absence of nausea and vomiting  10/4/2024 2323 by Nessa Taveras RN  Outcome: Progressing     Problem: Gastrointestinal - Adult  Goal: Maintains or returns to baseline bowel function  10/4/2024 2323 by Nessa Taveras RN  Outcome: Progressing     Problem: Discharge Planning  Goal: Discharge to home or other facility with appropriate resources  10/4/2024 2323 by Nessa Taveras RN  Outcome: Progressing     Problem: Nutrition Deficit:  Goal: Optimize nutritional status  10/4/2024 2323 by Nessa Taveras RN  Outcome: Progressing       Care plan reviewed with patient.  Patient verbalizes understanding of the

## 2024-10-05 NOTE — PROGRESS NOTES
Hospitalist Progress Note      Patient:  Baylee Irizarry 69 y.o. female     Unit/Bed:5K-11/011-A    Date of Admission: 9/26/2024      ASSESSMENT AND PLAN    Active Problems  Pericolonic fluid collection, possible abscess, diverticulitis: POD2 s/p Robotic assisted drainage of pelvic abscess from perforated diverticulitis. ALDEN drain in place. ID following and managing ABX. Pt is on CLD and RN can advance diet as pt tolerates. Pt is still on TPN, will need this to be weaned prior to DC.   Intractable n/v x 4 weeks. UGI 9/29 - contrast did pass. GI following. EGD with gastritis in the body and antrum with few inflammatory polyps, no biopsy was obtained.  Continue Reglan started 9/28. PRN rectal phenergan, IV Zofran / Compazine PRN. On IV PPI. Carafate has been started.  Moderate malnutrition. PICC placed and TPN started.   UTI(poa): Urine showed many bacteria and was orange. Culture E coli, sensitive to current treatment. Continue above Zosyn.  Hyperthyroidism, uncontrolled. Unable to keep orals down. TSH 0.005, free T4 3.42, free T3 5.4 Continue Tapazole 10 mg tid. Endocrinology consulted by general surgery. We currently do not have any endocrine coverage.     Resolved Problems  Dysphagia. SLP seen and cleared for regular diet with thin liquid when she is able to tolerate.  Hypokalemia due to GI loss, resolved with replacement. Mag noted to be 1.8.  Accelerated Hypertension, improved. Hx HTN: Lisinopril restarted. BP controlled.   Hyponatremia, resolved.  Leukocytosis:Likely due to n/v, resolved. 10.5 today. Afebrile.  Chronic Conditions (reviewed and stable unless otherwise stated)  Microcytic anemia. Hbg 9.3. CBC pending.   T2DM: BS trend in goal. Accu-Cheks qid. Hypoglycemia protocol Low-dose SSI.  Asthma: Continue albuterol nebulizer q6h prn  RIRI: Continue home CPAP  Lupus: Continue home Plaquenil 200 qd.  Morbid obesity. BMI 52.47.      LDA: []CVC / [x]PICC / []Midline / []Quick / []Drains / []Mediport /  OPERATIVE NOTE    DATE OF PROCEDURE: 11/16/2020    Pre-Operative Diagnosis: Acute cardiogenic shock requiring VA ECMO s/p decannulation    Post-Operative Diagnosis: same    PROCEDURE:    1. Aortic Graft Ligation and Burial    SURGEON: JACY RIVERA    ASSISTANT(S): none    ANESTHESIA: General    INDICATIONS FOR PROCEDURE:    Patient is a 71 year old male who underwent VA ECMO for cardiogenic shock. His cardiac function has improved and he has since been decannulated at the bedside. Patient is being returned to the OR for ligation and burial of aortic graft.    DETAILS OF PROCEDURE:    The patient was brought to the operating room and placed in a supine position. Monitoring equipment was attached to the patient. The patient was induced with general anesthesia . The parts were prepared and draped in the usual sterile fashion.    The skin around the cannula was incised with a scalpel. Using electrocautery, the soft tissue and fascia was divided. The graft was circumferentially freed from the surrounding tissue. A right angle clamp was applied and four silk ties were used to ligate the graft. The graft was cut and buried into the chest. Hemostasis was achieved. The incision was irrigated with copious saline. The soft tissue was closed in layers with the skin being closed with staples.     Patient was transferred in stable condition to the Intensive Care Unit.

## 2024-10-05 NOTE — PROGRESS NOTES
Pharmacy Consult for TPN/PPN Monitoring & Adjustment  IV Access: central      Dosing weight: 70 kg  Current insulin regimen: low sliding scale  Diet (excluding TPN): Full liquid diet  Maintenance fluid: none    Plan:  See components of tonight's TPN bag in the table below:  See components of tonight's TPN bag in the table below:  Date 10/3/24 10/4/24 10/5/24   Total kcal/kg 20 20 20   Total kcal 1400 1400 1400   Protein g/kg 1.5 1.5 1.5   Protein g 105 105 105   Protein kcal (4 kcal/g) 420 420 420   Lipid % 30 30 30   Lipid g 42 42 42   Lipid kcal (10 kcal/g) 420 420 420   Dextrose g 164.7 164.7 164.7   Dextrose kcal (3.4 kcal/g) 560 560 560   Infusion Rate (mL/hr) 90 90 90   Na Acetate (mEq) 100 100 100   Na Chloride (mEq) 100 100 100   Na Phos (mmol)  (3 mmol = 4 mEq Na) 0 0 0   Total Na (mEq) 200 200 200   K Acetate (mEq) 0 0 0   K Chloride (mEq) 0 0 0   K Phos (mmol)  (15 mmol = 22 mEq K) 25 25 25   Total K (mEq) 36.67 36.67 36.67   Ca Gluconate (mEq)  (1 g = 4.65 mEq) 4.65 4.65 4.65   Mag Sulfate (mEq)  (1 g = 8.12 mEq) 16.24 12.18 12.18   Multivitamin (mL) 0 10 0   Trace Elements (mL) 1 1 1     Summary of changes for tonight's TPN:   Remove MVI (due to shortage, only MWF)    Electrolyte Replacement:   None    Monitoring:  BMP, Mag, iCal, & Phos ordered for tomorrow with AM labs.    Pharmacy will continue to follow daily. Thank you for the consult.  Stephanie Knight [facility] BCPS  10/5/2024 9:52 AM

## 2024-10-05 NOTE — PROGRESS NOTES
[facility]  INPATIENT OCCUPATIONAL THERAPY  STRZ ONC MED 5K  EVALUATION      Discharge Recommendations: Home with assist PRN  Equipment Recommendations:   Pt has a shower chair, elevated toilet with armrests and plans to sleep in her lift chair at home.       Time In: 926  Time Out: 950  Timed Code Treatment Minutes: 9 Minutes  Minutes: 24          Date: 10/5/2024  Patient Name: Baylee Irizarry,   Gender: female      MRN: [id number]  : 1955  (69 y.o.)  Referring Practitioner: Shun Mejia MD  Diagnosis: Diverticulitis  Additional Pertinent Hx: Per EMR:Baylee Irizarry is a 69 y.o. female with PMHx of Anemia, Anxiety, Asthma, CHF, COPD, MDD, DM2, HA, Lupus, HTN, Obesity, RIRI who presents to [facility] ED with CC:1 month emesis and difficulty eating. On  given potassium at [facility], PCP advised her to come in. C/o abdominal soreness. Pain 7/10,   [facility] said thyroid problem. Denied fever no chills, feels cold. Denies urinary complaints,   On interview today patient states that she has been unable to keep much down for the past month.  She says pretty much all she can take in is 7 up.  She has been unable to take medications.  As such presented with BP significantly elevated.  Endorses nausea, vomiting, says she is always cold but denies fever or chills, she has no urinary complaints but does endorse that she has had frequent UTIs in the last 6 months.  Patient goes to [facility] for most care.  Denies CP but does endorse palpitations which happen occasionally.  Nausea and vomiting is her biggest complaint at this time. Pt underwent EGD on  with showed gastritis. Per notes,Patient with Pericolonic fluid collection, possible abscess, diverticulitis. s/p ROBOTIC DRAINAGE PELVIC ABSCESS (Abdomen) 10/3    Restrictions/Precautions:  Restrictions/Precautions: General Precautions, Modified Diet  Position Activity Restriction  Other position/activity restrictions: Pt refused to wear hospital socks

## 2024-10-05 NOTE — PROGRESS NOTES
[facility] GENERAL SURGERY  General Surgery Postoperative Progress Note    Pt Name: Baylee Irizarry  Medical Record Number: [id number]  Date of Birth 1955   Today's Date: 10/5/2024    CC:  Chief Complaint   Patient presents with    Fatigue    Emesis       ASSESSMENT   HD # 9  POD#2 status post robotic assisted drainage of pelvic abscess 10/3/2024  EGD showed gastritis, additional meds ordered  PLAN   Continue drain management   No further surgical plans at this time  Drain likely for several days and then remove  From my standpoint when oral intake is adequate and she is medically stable she will be discharged from my standpoint  Needs nutritional rebuilding to also help immune function  SUBJECTIVE   Baylee is doing ok.  In bed this morning.  Drain had 185 yesterday serosanguineous mostly serous.  Not much p.o. recorded yesterday and graphics from 7-7 so not sure if she took anything and are not  CURRENT MEDICATIONS   Scheduled Meds:   ampicillin-sulbactam  3,000 mg IntraVENous Q6H    triamcinolone   Topical BID    sucralfate  1 g Oral 4x Daily AC & HS    pantoprazole (PROTONIX) 40 mg in sodium chloride (PF) 0.9 % 10 mL injection  40 mg IntraVENous Daily    metoclopramide  10 mg IntraVENous Q6H    sodium chloride flush  5-40 mL IntraVENous 2 times per day    diclofenac sodium  2 g Topical BID    hydroxychloroquine  200 mg Oral Daily    lisinopril  40 mg Oral Daily    montelukast  10 mg Oral Nightly    methIMAzole  10 mg Oral TID    enoxaparin  30 mg SubCUTAneous BID    insulin lispro  0-4 Units SubCUTAneous Q6H     Continuous Infusions:   PN-Adult  3 IN 1 Central Line (Custom) 90 mL/hr at 10/04/24 1833    sodium chloride 25 mL/hr at 10/02/24 0958    dextrose       PRN Meds:.HYDROmorphone **OR** HYDROmorphone, promethazine, hydrALAZINE, calcium carbonate, sodium chloride flush, sodium chloride, potassium chloride **OR** potassium alternative oral replacement **OR** potassium chloride,  Chronic findings are discussed.            **This report has been created using voice recognition software.  It may contain   minor errors which are inherent in voice recognition technology.**      Electronically signed by Dr. Kenia Sesay      XR CHEST PORTABLE   Final Result   PICC line tip in the SVC. No pulmonary infiltrates.               **This report has been created using voice recognition software. It may contain   minor errors which are inherent in voice recognition technology.**      Electronically signed by Dr. Nivia Knight      FL UGI   Final Result      Oral contrast did pass into proximal small bowel loops. Initially, there was   some delayed passage through the distal esophagus and stomach.         **This report has been created using voice recognition software. It may contain   minor errors which are inherent in voice recognition technology.**         Electronically signed by Dr. Nivia Knight      XR CHEST (2 VW)   Final Result   Impression:   Chronic bronchitis versus viral bronchiolitis subtly suggested in the    upper lobes predominantly see above for discussion.      This document has been electronically signed by: Eduardo Pedroza III, MD on    09/27/2024 03:20 AM      CT ABDOMEN PELVIS W IV CONTRAST Additional Contrast? None   Final Result   1. Colonic diverticulosis with pericolonic fat stranding adjacent to the mid   sigmoid colon suggests diverticulitis. No bowel obstruction is identified. No   free air is observed. Ill-defined pockets of fluid along the lateral margin of   the sigmoid colon measure up to 23 mm in diameter (series 301, image 61)   suspicious for peridiverticular abscess. The fat stranding surrounding the   sigmoid colon also surrounds the urinary bladder and the urinary bladder wall   appears thickened which could indicate infectious/inflammatory cystitis   secondary to diverticulitis. Correlate with urinalysis. A somewhat elongated   collection of fluid in the midline

## 2024-10-05 NOTE — PROGRESS NOTES
[facility] Infectious Disease         Progress Note      Baylee Irizarry  MEDICAL RECORD NUMBER:  [id number]  AGE: 69 y.o.   GENDER: female  : 1955  EPISODE DATE:  2024      Assessment:     Pt is a 69yof who I am consulted for intra-abdominal abscess. Currently on therapy with pip/tazo.      The fluid collections in the abdomen near mid line pelvic as well as mid sigmoid have both increased in size. The sigmoid collection is fairly small and the location of the midline collection is fairly close in proximity to bladder. They both may be difficult to access. While on empiric therapy, the size has increased which is concerning for 3 possibilities. 1. The collection is not being penetrated by antibiotics (most likely). 2. The organisms present in the collection are discordant (resistant) to antibiotic coverage (alternative may be ceftriaxone or FQ paired with clarke). The issue should not be coverage in my opinion as this regimen should adequately cover most of the enterobacteriaceae and anaerobes that would be involved.      - Stop pip/tazo   - Switch to amp/sulbactam   - Remains on liquid diet, would defer starting oral antibiotics  - If transition to oral and doing well on unasyn, could make switch to augmentin 875 bid  - Would treat with 4-5 days of antibiotics following surgical drainage procedure   - EOT 10/7      Subjective:     Chief Complaint   Patient presents with    Fatigue    Emesis         Pt resting comfortably in bed. She has no complaints or concerns this morning other than limited diet. She underwent surgical drainage on 10/3. Explained WBC improved, would switch to unasyn.     Patient Active Problem List   Diagnosis Code    Controlled diabetes mellitus type 2 with complications (HCC) E11.8    HTN (hypertension) I10    Microcytic anemia D50.9    Severe persistent asthma J45.50    Diverticulitis K57.92    Hyperthyroidism E05.90    Morbid obesity E66.01    Diverticulitis of large  06:09 AM    K 4.8 10/04/2024 06:09 AM     10/04/2024 06:09 AM    CO2 28 10/04/2024 06:09 AM    PHOS 3.2 10/04/2024 06:09 AM    BUN 21 10/04/2024 06:09 AM    CREATININE 0.4 10/04/2024 06:09 AM     PT/INR:   Lab Results   Component Value Date/Time    PROTIME 12.4 11/11/2023 09:01 AM    INR 1.1 11/11/2023 09:01 AM     Prealbumin: No results found for: \"PREALBUMIN\"  Albumin:No results found for: \"LABALBU\"  Sed Rate:  Lab Results   Component Value Date/Time    SEDRATE 51 11/11/2023 09:01 AM     CRP:   Lab Results   Component Value Date/Time    CRP 4.43 12/01/2014 03:27 PM     Micro:   Lab Results   Component Value Date/Time    BC  09/26/2024 12:16 PM     No growth 24 hours. No growth 48 hours. No growth at 5 days      Hemoglobin A1C:   Lab Results   Component Value Date/Time    LABA1C 6.2 04/20/2018 01:30 PM           Electronically signed by Devin Lovett MD TD@ at 6:46 AM

## 2024-10-06 LAB
ANION GAP SERPL CALC-SCNC: 9 MEQ/L (ref 8–16)
BASOPHILS ABSOLUTE: 0 THOU/MM3 (ref 0–0.1)
BASOPHILS NFR BLD AUTO: 0.2 %
BUN SERPL-MCNC: 18 MG/DL (ref 7–22)
CA-I BLD ISE-SCNC: 1.19 MMOL/L (ref 1.12–1.32)
CALCIUM SERPL-MCNC: 8 MG/DL (ref 8.5–10.5)
CHLORIDE SERPL-SCNC: 101 MEQ/L (ref 98–111)
CO2 SERPL-SCNC: 27 MEQ/L (ref 23–33)
CREAT SERPL-MCNC: 0.4 MG/DL (ref 0.4–1.2)
DEPRECATED RDW RBC AUTO: 40.9 FL (ref 35–45)
EOSINOPHIL NFR BLD AUTO: 0 %
EOSINOPHILS ABSOLUTE: 0 THOU/MM3 (ref 0–0.4)
ERYTHROCYTE [DISTWIDTH] IN BLOOD BY AUTOMATED COUNT: 14.6 % (ref 11.5–14.5)
GFR SERPL CREATININE-BSD FRML MDRD: > 90 ML/MIN/1.73M2
GLUCOSE BLD STRIP.AUTO-MCNC: 115 MG/DL (ref 70–108)
GLUCOSE BLD STRIP.AUTO-MCNC: 119 MG/DL (ref 70–108)
GLUCOSE BLD STRIP.AUTO-MCNC: 120 MG/DL (ref 70–108)
GLUCOSE BLD STRIP.AUTO-MCNC: 123 MG/DL (ref 70–108)
GLUCOSE SERPL-MCNC: 115 MG/DL (ref 70–108)
HCT VFR BLD AUTO: 27.6 % (ref 37–47)
HGB BLD-MCNC: 8.3 GM/DL (ref 12–16)
IMM GRANULOCYTES # BLD AUTO: 0.15 THOU/MM3 (ref 0–0.07)
IMM GRANULOCYTES NFR BLD AUTO: 1.5 %
LYMPHOCYTES ABSOLUTE: 2.4 THOU/MM3 (ref 1–4.8)
LYMPHOCYTES NFR BLD AUTO: 24 %
MAGNESIUM SERPL-MCNC: 1.9 MG/DL (ref 1.6–2.4)
MCH RBC QN AUTO: 23.6 PG (ref 26–33)
MCHC RBC AUTO-ENTMCNC: 30.1 GM/DL (ref 32.2–35.5)
MCV RBC AUTO: 78.6 FL (ref 81–99)
MONOCYTES ABSOLUTE: 1.2 THOU/MM3 (ref 0.4–1.3)
MONOCYTES NFR BLD AUTO: 11.7 %
NEUTROPHILS ABSOLUTE: 6.3 THOU/MM3 (ref 1.8–7.7)
NEUTROPHILS NFR BLD AUTO: 62.6 %
NRBC BLD AUTO-RTO: 0 /100 WBC
PHOSPHATE SERPL-MCNC: 3.8 MG/DL (ref 2.4–4.7)
PLATELET # BLD AUTO: 260 THOU/MM3 (ref 130–400)
PMV BLD AUTO: 13 FL (ref 9.4–12.4)
POTASSIUM SERPL-SCNC: 4.5 MEQ/L (ref 3.5–5.2)
RBC # BLD AUTO: 3.51 MILL/MM3 (ref 4.2–5.4)
SODIUM SERPL-SCNC: 137 MEQ/L (ref 135–145)
WBC # BLD AUTO: 10.1 THOU/MM3 (ref 4.8–10.8)

## 2024-10-06 PROCEDURE — 84100 ASSAY OF PHOSPHORUS: CPT

## 2024-10-06 PROCEDURE — 82330 ASSAY OF CALCIUM: CPT

## 2024-10-06 PROCEDURE — 36415 COLL VENOUS BLD VENIPUNCTURE: CPT

## 2024-10-06 PROCEDURE — 82948 REAGENT STRIP/BLOOD GLUCOSE: CPT

## 2024-10-06 PROCEDURE — 2580000003 HC RX 258: Performed by: SURGERY

## 2024-10-06 PROCEDURE — 2500000003 HC RX 250 WO HCPCS: Performed by: NURSE PRACTITIONER

## 2024-10-06 PROCEDURE — 6360000002 HC RX W HCPCS: Performed by: SURGERY

## 2024-10-06 PROCEDURE — 1200000000 HC SEMI PRIVATE

## 2024-10-06 PROCEDURE — 6370000000 HC RX 637 (ALT 250 FOR IP): Performed by: SURGERY

## 2024-10-06 PROCEDURE — 99233 SBSQ HOSP IP/OBS HIGH 50: CPT | Performed by: STUDENT IN AN ORGANIZED HEALTH CARE EDUCATION/TRAINING PROGRAM

## 2024-10-06 PROCEDURE — 6360000002 HC RX W HCPCS: Performed by: NURSE PRACTITIONER

## 2024-10-06 PROCEDURE — 99232 SBSQ HOSP IP/OBS MODERATE 35: CPT | Performed by: INTERNAL MEDICINE

## 2024-10-06 PROCEDURE — 85025 COMPLETE CBC W/AUTO DIFF WBC: CPT

## 2024-10-06 PROCEDURE — 6360000002 HC RX W HCPCS: Performed by: PHYSICIAN ASSISTANT

## 2024-10-06 PROCEDURE — 6360000002 HC RX W HCPCS: Performed by: STUDENT IN AN ORGANIZED HEALTH CARE EDUCATION/TRAINING PROGRAM

## 2024-10-06 PROCEDURE — 83735 ASSAY OF MAGNESIUM: CPT

## 2024-10-06 PROCEDURE — 80048 BASIC METABOLIC PNL TOTAL CA: CPT

## 2024-10-06 PROCEDURE — 2580000003 HC RX 258: Performed by: STUDENT IN AN ORGANIZED HEALTH CARE EDUCATION/TRAINING PROGRAM

## 2024-10-06 PROCEDURE — 2580000003 HC RX 258: Performed by: NURSE PRACTITIONER

## 2024-10-06 RX ADMIN — METOCLOPRAMIDE 10 MG: 5 INJECTION, SOLUTION INTRAMUSCULAR; INTRAVENOUS at 16:56

## 2024-10-06 RX ADMIN — METOCLOPRAMIDE 10 MG: 5 INJECTION, SOLUTION INTRAMUSCULAR; INTRAVENOUS at 23:07

## 2024-10-06 RX ADMIN — AMPICILLIN SODIUM AND SULBACTAM SODIUM 3000 MG: 2; 1 INJECTION, POWDER, FOR SOLUTION INTRAMUSCULAR; INTRAVENOUS at 08:45

## 2024-10-06 RX ADMIN — AMPICILLIN SODIUM AND SULBACTAM SODIUM 3000 MG: 2; 1 INJECTION, POWDER, FOR SOLUTION INTRAMUSCULAR; INTRAVENOUS at 14:42

## 2024-10-06 RX ADMIN — HYDROMORPHONE HYDROCHLORIDE 0.5 MG: 1 INJECTION, SOLUTION INTRAMUSCULAR; INTRAVENOUS; SUBCUTANEOUS at 23:04

## 2024-10-06 RX ADMIN — TRIAMCINOLONE ACETONIDE: 1 CREAM TOPICAL at 20:39

## 2024-10-06 RX ADMIN — AMPICILLIN SODIUM AND SULBACTAM SODIUM 3000 MG: 2; 1 INJECTION, POWDER, FOR SOLUTION INTRAMUSCULAR; INTRAVENOUS at 20:33

## 2024-10-06 RX ADMIN — METOCLOPRAMIDE 10 MG: 5 INJECTION, SOLUTION INTRAMUSCULAR; INTRAVENOUS at 05:14

## 2024-10-06 RX ADMIN — AMPICILLIN SODIUM AND SULBACTAM SODIUM 3000 MG: 2; 1 INJECTION, POWDER, FOR SOLUTION INTRAMUSCULAR; INTRAVENOUS at 02:09

## 2024-10-06 RX ADMIN — METHIMAZOLE 10 MG: 10 TABLET ORAL at 08:34

## 2024-10-06 RX ADMIN — LISINOPRIL 40 MG: 40 TABLET ORAL at 08:34

## 2024-10-06 RX ADMIN — ENOXAPARIN SODIUM 30 MG: 100 INJECTION SUBCUTANEOUS at 08:35

## 2024-10-06 RX ADMIN — CALCIUM GLUCONATE: 98 INJECTION, SOLUTION INTRAVENOUS at 16:54

## 2024-10-06 RX ADMIN — METHIMAZOLE 10 MG: 10 TABLET ORAL at 15:01

## 2024-10-06 RX ADMIN — ONDANSETRON 4 MG: 2 INJECTION INTRAMUSCULAR; INTRAVENOUS at 12:14

## 2024-10-06 RX ADMIN — ENOXAPARIN SODIUM 30 MG: 100 INJECTION SUBCUTANEOUS at 20:38

## 2024-10-06 RX ADMIN — PANTOPRAZOLE SODIUM 40 MG: 40 INJECTION, POWDER, FOR SOLUTION INTRAVENOUS at 08:35

## 2024-10-06 RX ADMIN — METOCLOPRAMIDE 10 MG: 5 INJECTION, SOLUTION INTRAMUSCULAR; INTRAVENOUS at 00:10

## 2024-10-06 RX ADMIN — HYDROMORPHONE HYDROCHLORIDE 0.5 MG: 1 INJECTION, SOLUTION INTRAMUSCULAR; INTRAVENOUS; SUBCUTANEOUS at 15:03

## 2024-10-06 RX ADMIN — METOCLOPRAMIDE 10 MG: 5 INJECTION, SOLUTION INTRAMUSCULAR; INTRAVENOUS at 11:38

## 2024-10-06 RX ADMIN — TRIAMCINOLONE ACETONIDE: 1 CREAM TOPICAL at 08:39

## 2024-10-06 ASSESSMENT — PAIN SCALES - GENERAL
PAINLEVEL_OUTOF10: 7
PAINLEVEL_OUTOF10: 0
PAINLEVEL_OUTOF10: 6
PAINLEVEL_OUTOF10: 7
PAINLEVEL_OUTOF10: 4

## 2024-10-06 ASSESSMENT — PAIN - FUNCTIONAL ASSESSMENT: PAIN_FUNCTIONAL_ASSESSMENT: PREVENTS OR INTERFERES SOME ACTIVE ACTIVITIES AND ADLS

## 2024-10-06 ASSESSMENT — PAIN DESCRIPTION - LOCATION
LOCATION: ABDOMEN;BACK
LOCATION: ABDOMEN

## 2024-10-06 ASSESSMENT — PAIN DESCRIPTION - ORIENTATION
ORIENTATION: LOWER
ORIENTATION: LOWER

## 2024-10-06 ASSESSMENT — PAIN DESCRIPTION - DESCRIPTORS: DESCRIPTORS: SHARP

## 2024-10-06 NOTE — PLAN OF CARE
Problem: Chronic Conditions and Co-morbidities  Goal: Patient's chronic conditions and co-morbidity symptoms are monitored and maintained or improved  Outcome: Progressing     Problem: Pain  Goal: Verbalizes/displays adequate comfort level or baseline comfort level  Outcome: Progressing  Flowsheets (Taken 10/5/2024 2000)  Verbalizes/displays adequate comfort level or baseline comfort level: Assess pain using appropriate pain scale     Problem: Safety - Adult  Goal: Free from fall injury  Outcome: Progressing     Problem: Skin/Tissue Integrity  Goal: Absence of new skin breakdown  Description: 1.  Monitor for areas of redness and/or skin breakdown  2.  Assess vascular access sites hourly  3.  Every 4-6 hours minimum:  Change oxygen saturation probe site  4.  Every 4-6 hours:  If on nasal continuous positive airway pressure, respiratory therapy assess nares and determine need for appliance change or resting period.  Outcome: Progressing     Problem: Gastrointestinal - Adult  Goal: Minimal or absence of nausea and vomiting  Outcome: Progressing  Goal: Maintains or returns to baseline bowel function  Outcome: Progressing     Problem: Discharge Planning  Goal: Discharge to home or other facility with appropriate resources  Outcome: Progressing     Problem: Nutrition Deficit:  Goal: Optimize nutritional status  Outcome: Progressing     Problem: Chronic Conditions and Co-morbidities  Goal: Patient's chronic conditions and co-morbidity symptoms are monitored and maintained or improved  Outcome: Progressing     Problem: Pain  Goal: Verbalizes/displays adequate comfort level or baseline comfort level  Outcome: Progressing  Flowsheets (Taken 10/5/2024 2000)  Verbalizes/displays adequate comfort level or baseline comfort level: Assess pain using appropriate pain scale     Problem: Safety - Adult  Goal: Free from fall injury  Outcome: Progressing     Problem: Skin/Tissue Integrity  Goal: Absence of new skin

## 2024-10-06 NOTE — PLAN OF CARE
Problem: Chronic Conditions and Co-morbidities  Goal: Patient's chronic conditions and co-morbidity symptoms are monitored and maintained or improved  10/1/2024 1045 by Bruna Bo RN  Outcome: Progressing   Patient's chronic conditions and co-morbidity symptoms are monitored and maintained.     Problem: Pain  Goal: Verbalizes/displays adequate comfort level or baseline comfort level  10/1/2024 1045 by Bruna Bo, RN  Outcome: Progressing  Patient states pain relief from PRN pain medications. Pain reassessed one hour post PRN pain medication given.  Patient rates pain 3 on MINOO 0-10 scale.     Problem: Safety - Adult  Goal: Free from fall injury  10/1/2024 1045 by Bruna Bo RN  Outcome: Progressing  Fall assessment completed. Patient using call light appropriately to call for assistance with ambulation to bathroom.  Personal items within reach. Patient is also compliant with use of non-skid slippers.      Problem: Skin/Tissue Integrity  Goal: Absence of new skin breakdown  Description: 1.  Monitor for areas of redness and/or skin breakdown  2.  Assess vascular access sites hourly  3.  Every 4-6 hours minimum:  Change oxygen saturation probe site  4.  Every 4-6 hours:  If on nasal continuous positive airway pressure, respiratory therapy assess nares and determine need for appliance change or resting period.  10/1/2024 1045 by Bruna Bo RN  Outcome: Progressing  No skin breakdown this shift. Patient being assisted with turning. Patients states understanding of repositioning every two hours.     Problem: Gastrointestinal - Adult  Goal: Minimal or absence of nausea and vomiting  10/1/2024 1045 by Bruna Bo, RN  Outcome: Progressing  Nausea at times. Patient taking compazine and zofran for nausea control.     Problem: Gastrointestinal - Adult  Goal: Maintains or returns to baseline bowel function  10/1/2024 1045 by Bruna Bo RN  Outcome:  Progressing  Patient bowel sounds active.  Passing flatus.  Pt taking prescribed medication to assist with BM.     Problem: Discharge Planning  Goal: Discharge to home or other facility with appropriate resources  10/1/2024 1045 by Bruna Bo, RN  Outcome: Progressing  Discharge plan is in process. Plan discharge home with family.     Problem: Nutrition Deficit:  Goal: Optimize nutritional status  10/1/2024 1045 by Bruna Bo, RN  Outcome: Progressing  Flowsheets (Taken 10/1/2024 1005 by Suki Sotelo, MS, RD, LD)  Nutrient intake appropriate for improving, restoring, or maintaining nutritional needs:   Assess nutritional status and recommend course of action   Monitor oral intake, labs, and treatment plans   Recommend, monitor, and adjust tube feedings and TPN/PPN based on assessed needs    Patient tolerating soft diet. C/o nausea at times.  TPN per order.    Care plan reviewed with patient and family. Patient and family verbalized understanding.

## 2024-10-06 NOTE — PROGRESS NOTES
Pharmacy Consult for TPN/PPN Monitoring & Adjustment  IV Access: central      Dosing weight: 70 kg  Current insulin regimen: low sliding scale  Diet (excluding TPN): Full liquid diet  Maintenance fluid: none    Plan:  See components of tonight's TPN bag in the table below:  Date 10/4/24 10/5/24 10/6/24   Total kcal/kg 20 20 20   Total kcal 1400 1400 1400   Protein g/kg 1.5 1.5 1.5   Protein g 105 105 105   Protein kcal (4 kcal/g) 420 420 420   Lipid % 30 30 30   Lipid g 42 42 42   Lipid kcal (10 kcal/g) 420 420 420   Dextrose g 164.7 164.7 164.7   Dextrose kcal (3.4 kcal/g) 560 560 560   Infusion Rate (mL/hr) 90 90 90   Na Acetate (mEq) 100 100 100   Na Chloride (mEq) 100 100 100   Na Phos (mmol)  (3 mmol = 4 mEq Na) 0 0 0   Total Na (mEq) 200 200 200   K Acetate (mEq) 0 0 0   K Chloride (mEq) 0 0 0   K Phos (mmol)  (15 mmol = 22 mEq K) 25 25 25   Total K (mEq) 36.67 36.67 36.67   Ca Gluconate (mEq)  (1 g = 4.65 mEq) 4.65 4.65 4.65   Mag Sulfate (mEq)  (1 g = 8.12 mEq) 12.18 12.18 12.18   Multivitamin (mL) 10 0 0   Trace Elements (mL) 1 1 1     Summary of changes for tonight's TPN:   No changes    Electrolyte Replacement:   None    Monitoring:  BMP, Mag, iCal, & Phos ordered for tomorrow with AM labs.    Pharmacy will continue to follow daily. Thank you for the consult.  Stephanie Knight [facility] BCPS  10/6/2024 8:15 AM

## 2024-10-06 NOTE — PROGRESS NOTES
Hospitalist Progress Note      Patient:  Baylee Irizarry 69 y.o. female     Unit/Bed:5K-11/011-A    Date of Admission: 9/26/2024      ASSESSMENT AND PLAN    Active Problems  Pericolonic fluid collection, possible abscess, diverticulitis: POD 3 s/p Robotic assisted drainage of pelvic abscess from perforated diverticulitis. ALDEN drain in place. ID following and managing ABX. Diet advanced to soft today. Pt is still on TPN, will need this to be weaned prior to DC.   Intractable n/v x 4 weeks. UGI 9/29 - contrast did pass. GI following. EGD with gastritis in the body and antrum with few inflammatory polyps, no biopsy was obtained.  Continue Reglan started 9/28. PRN rectal phenergan, IV Zofran / Compazine PRN. On IV PPI. Carafate has been started.  Moderate malnutrition. PICC placed and TPN started.   UTI(poa): Urine showed many bacteria and was orange. Culture E coli, sensitive to current treatment. Continue above Zosyn.  Hyperthyroidism, uncontrolled. Unable to keep orals down. TSH 0.005, free T4 3.42, free T3 5.4 Continue Tapazole 10 mg tid. Endocrinology consulted by general surgery. We currently do not have any endocrine coverage.     Dispo: slowly improving, advanced to soft diet today. ALDEN drain still in place. Still getting IV Abx, continue this for now per ID. Transition to PO Augmentin at discharge. Patient still on TPN, this will need to be discontinued at discharge once tolerating PO intake.       Resolved Problems  Dysphagia. SLP seen and cleared for regular diet with thin liquid when she is able to tolerate.  Hypokalemia due to GI loss, resolved with replacement. Mag noted to be 1.8.  Accelerated Hypertension, improved. Hx HTN: Lisinopril restarted. BP controlled.   Hyponatremia, resolved.  Leukocytosis:Likely due to n/v, resolved. 10.5 today. Afebrile.  Chronic Conditions (reviewed and stable unless otherwise stated)  Microcytic anemia. Hbg 9.3. CBC pending.   T2DM: BS trend in goal. Accu-Cheks qid.  distress, appears stated age and cooperative.  HEENT: Pupils equal, round, and reactive to light. Conjunctivae/corneas clear.  Neck: Supple, with full range of motion. No jugular venous distention. Trachea midline.  Respiratory:  Normal respiratory effort. Clear to auscultation, bilaterally without Rales/Wheezes/Rhonchi.  Cardiovascular: Regular rate and rhythm with normal S1/S2 without murmurs, rubs or gallops.  Abdomen: Soft, obese, non-tender, non-distended with normal bowel sounds. Incisions c/d/I. ALDEN intact, minimal drainage.   Musculoskeletal: passive and active ROM x 4 extremities.  Skin: Skin color, texture, turgor normal.  No rashes or lesions.  Neurologic:  Neurovascularly intact without any focal sensory/motor deficits. Cranial nerves: II-XII intact, grossly non-focal.  Psychiatric: Alert and oriented, thought content appropriate, normal insight  Capillary Refill: Brisk,< 3 seconds   Peripheral Pulses: +2 palpable, equal bilaterally       Labs/Radiology: See chart or assessment above.     Electronically signed by Saskia Hong MD on 10/6/2024 at 11:26 AM

## 2024-10-06 NOTE — PROGRESS NOTES
[facility] Infectious Disease         Progress Note      Baylee Irizarry  MEDICAL RECORD NUMBER:  [id number]  AGE: 69 y.o.   GENDER: female  : 1955  EPISODE DATE:  2024      Assessment:     Pt is a 69yof who I am consulted for intra-abdominal abscess. Currently on therapy with pip/tazo.      The fluid collections in the abdomen near mid line pelvic as well as mid sigmoid have both increased in size. The sigmoid collection is fairly small and the location of the midline collection is fairly close in proximity to bladder. They both may be difficult to access. While on empiric therapy, the size has increased which is concerning for 3 possibilities. 1. The collection is not being penetrated by antibiotics (most likely). 2. The organisms present in the collection are discordant (resistant) to antibiotic coverage (alternative may be ceftriaxone or FQ paired with clarke). The issue should not be coverage in my opinion as this regimen should adequately cover most of the enterobacteriaceae and anaerobes that would be involved.      - Currently on amp/sulbactam   - Remains on liquid diet, would defer starting oral antibiotics  - If transition to oral and doing well on unasyn, could make switch to augmentin 875 bid  - Would treat with 4-5 days of antibiotics following surgical drainage procedure   - EOT 10/7   - Complete antimicrobials tomorrow    Subjective:     Chief Complaint   Patient presents with    Fatigue    Emesis         Pt resting comfortably in bed. She has no complaints or concerns this morning other than limited diet. She underwent surgical drainage on 10/3.     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

## 2024-10-06 NOTE — PROGRESS NOTES
Gastroenterology  Progress Note    10/6/2024 11:58 AM  Subjective:   Admit Date: 9/26/2024    Interval History: Patient presented with diverticulitis nausea and vomits has doctors plan to conservative therapy plan to start PPI twice daily and upper endoscopy diagnostic on Monday.    9/29/2024 clinically improved patient for EGD tomorrow    10/2/2024 discussed the finding with the EGD no GI bleed seen no biopsy obtained nausea vomit improved patient able to eat advise a diet elective colonoscopy after discharge for evaluation continued antibiotic therapy.  Patient more alert oriented diet to advance as tolerated.  Likely will need elective EGD and colonoscopy as an outpatient    10/4/24 - labs reviewed, WBC increased.  H&H low but stable.  Had robotic assisted laparoscopy with drainage of colon abscess per General Surgery.  Pt has ALDEN drain - serosanguinous drainage noted.  Pt denies N/V.  Having some abdominal pain.  No BM but passing flatus.      10/6/2024 patient going to start me today the drainage send left lower quadrant she is on wide spectrum antibiotics having no nausea vomit at this time diet advanced up to the surgery service    Diet: ADULT ORAL NUTRITION SUPPLEMENT; Breakfast, Lunch, Dinner; Clear Liquid Oral Supplement  PN-Adult  3 IN 1 Central Line (Custom)  PN-Adult  3 IN 1 Central Line (Custom)  ADULT DIET; Dysphagia - Soft and Bite Sized    Medications:   Scheduled Meds:    ampicillin-sulbactam  3,000 mg IntraVENous Q6H    triamcinolone   Topical BID    sucralfate  1 g Oral 4x Daily AC & HS    pantoprazole (PROTONIX) 40 mg in sodium chloride (PF) 0.9 % 10 mL injection  40 mg IntraVENous Daily    metoclopramide  10 mg IntraVENous Q6H    sodium chloride flush  5-40 mL IntraVENous 2 times per day    diclofenac sodium  2 g Topical BID    hydroxychloroquine  200 mg Oral Daily    lisinopril  40 mg Oral Daily    montelukast  10 mg Oral Nightly    methIMAzole  10 mg Oral TID    enoxaparin  30 mg  identified.    Gastrointestinal: Colonic diverticulosis with pericolonic fat stranding adjacent  to the sigmoid colon is associated with an ill-defined collection of fluid  measuring up to 23 mm (series 301, image 61). There is also fluid attenuation  which is visualized at the midline (series 301, image 68) measuring up to 22 mm.  No bowel obstruction is identified. No free air is observed.    Retroperitoneum / lymph nodes: The aorta is not dilated. No lymphadenopathy is  observed.    Pelvis: Uterus is absent.    Musculoskeletal: Multilevel degenerative disc disease is observed. Diffuse  osteopenia is present. The visualized skeletal structures appear intact.    Impression  1. Colonic diverticulosis with pericolonic fat stranding adjacent to the mid  sigmoid colon suggests diverticulitis. No bowel obstruction is identified. No  free air is observed. Ill-defined pockets of fluid along the lateral margin of  the sigmoid colon measure up to 23 mm in diameter (series 301, image 61)  suspicious for peridiverticular abscess. The fat stranding surrounding the  sigmoid colon also surrounds the urinary bladder and the urinary bladder wall  appears thickened which could indicate infectious/inflammatory cystitis  secondary to diverticulitis. Correlate with urinalysis. A somewhat elongated  collection of fluid in the midline (series 301, image 68) may represent a second  peridiverticular abscess. It appears to extend along the post hysterectomy  portion of the pelvis. Continued follow-up to ensure resolution is advised.    2. Chronic findings are discussed.      **This report has been created using voice recognition software.  It may contain  minor errors which are inherent in voice recognition technology.**    Electronically signed by Dr. Kenia Sesay    No results found for this or any previous visit.    No results found for this or any previous visit.      Endoscopy Finding:       [facility]

## 2024-10-07 LAB
ANION GAP SERPL CALC-SCNC: 8 MEQ/L (ref 8–16)
BUN SERPL-MCNC: 15 MG/DL (ref 7–22)
CA-I BLD ISE-SCNC: 1.17 MMOL/L (ref 1.12–1.32)
CALCIUM SERPL-MCNC: 8.1 MG/DL (ref 8.5–10.5)
CHLORIDE SERPL-SCNC: 100 MEQ/L (ref 98–111)
CO2 SERPL-SCNC: 27 MEQ/L (ref 23–33)
CREAT SERPL-MCNC: 0.3 MG/DL (ref 0.4–1.2)
GFR SERPL CREATININE-BSD FRML MDRD: > 90 ML/MIN/1.73M2
GLUCOSE BLD STRIP.AUTO-MCNC: 107 MG/DL (ref 70–108)
GLUCOSE BLD STRIP.AUTO-MCNC: 111 MG/DL (ref 70–108)
GLUCOSE BLD STRIP.AUTO-MCNC: 116 MG/DL (ref 70–108)
GLUCOSE BLD STRIP.AUTO-MCNC: 93 MG/DL (ref 70–108)
GLUCOSE SERPL-MCNC: 121 MG/DL (ref 70–108)
MAGNESIUM SERPL-MCNC: 1.8 MG/DL (ref 1.6–2.4)
PHOSPHATE SERPL-MCNC: 3.6 MG/DL (ref 2.4–4.7)
POTASSIUM SERPL-SCNC: 4.1 MEQ/L (ref 3.5–5.2)
SODIUM SERPL-SCNC: 135 MEQ/L (ref 135–145)

## 2024-10-07 PROCEDURE — 36415 COLL VENOUS BLD VENIPUNCTURE: CPT

## 2024-10-07 PROCEDURE — 97535 SELF CARE MNGMENT TRAINING: CPT

## 2024-10-07 PROCEDURE — 99233 SBSQ HOSP IP/OBS HIGH 50: CPT | Performed by: STUDENT IN AN ORGANIZED HEALTH CARE EDUCATION/TRAINING PROGRAM

## 2024-10-07 PROCEDURE — 2580000003 HC RX 258: Performed by: STUDENT IN AN ORGANIZED HEALTH CARE EDUCATION/TRAINING PROGRAM

## 2024-10-07 PROCEDURE — 97530 THERAPEUTIC ACTIVITIES: CPT

## 2024-10-07 PROCEDURE — 1200000000 HC SEMI PRIVATE

## 2024-10-07 PROCEDURE — 6360000002 HC RX W HCPCS: Performed by: SURGERY

## 2024-10-07 PROCEDURE — 80048 BASIC METABOLIC PNL TOTAL CA: CPT

## 2024-10-07 PROCEDURE — 82948 REAGENT STRIP/BLOOD GLUCOSE: CPT

## 2024-10-07 PROCEDURE — 6370000000 HC RX 637 (ALT 250 FOR IP): Performed by: SURGERY

## 2024-10-07 PROCEDURE — 6370000000 HC RX 637 (ALT 250 FOR IP): Performed by: PHYSICIAN ASSISTANT

## 2024-10-07 PROCEDURE — 83735 ASSAY OF MAGNESIUM: CPT

## 2024-10-07 PROCEDURE — 84100 ASSAY OF PHOSPHORUS: CPT

## 2024-10-07 PROCEDURE — 97116 GAIT TRAINING THERAPY: CPT

## 2024-10-07 PROCEDURE — 99233 SBSQ HOSP IP/OBS HIGH 50: CPT | Performed by: PHYSICIAN ASSISTANT

## 2024-10-07 PROCEDURE — 2580000003 HC RX 258: Performed by: SURGERY

## 2024-10-07 PROCEDURE — 6360000002 HC RX W HCPCS: Performed by: STUDENT IN AN ORGANIZED HEALTH CARE EDUCATION/TRAINING PROGRAM

## 2024-10-07 PROCEDURE — 82330 ASSAY OF CALCIUM: CPT

## 2024-10-07 PROCEDURE — 97110 THERAPEUTIC EXERCISES: CPT

## 2024-10-07 RX ORDER — PANTOPRAZOLE SODIUM 40 MG/1
40 TABLET, DELAYED RELEASE ORAL
Status: DISCONTINUED | OUTPATIENT
Start: 2024-10-08 | End: 2024-10-08 | Stop reason: HOSPADM

## 2024-10-07 RX ORDER — CARVEDILOL 6.25 MG/1
6.25 TABLET ORAL 2 TIMES DAILY WITH MEALS
Status: DISCONTINUED | OUTPATIENT
Start: 2024-10-07 | End: 2024-10-08 | Stop reason: HOSPADM

## 2024-10-07 RX ADMIN — PANTOPRAZOLE SODIUM 40 MG: 40 INJECTION, POWDER, FOR SOLUTION INTRAVENOUS at 08:27

## 2024-10-07 RX ADMIN — AMPICILLIN SODIUM AND SULBACTAM SODIUM 3000 MG: 2; 1 INJECTION, POWDER, FOR SOLUTION INTRAMUSCULAR; INTRAVENOUS at 08:38

## 2024-10-07 RX ADMIN — METOCLOPRAMIDE 10 MG: 5 INJECTION, SOLUTION INTRAMUSCULAR; INTRAVENOUS at 05:24

## 2024-10-07 RX ADMIN — LISINOPRIL 40 MG: 40 TABLET ORAL at 08:31

## 2024-10-07 RX ADMIN — METHIMAZOLE 15 MG: 10 TABLET ORAL at 14:41

## 2024-10-07 RX ADMIN — TRIAMCINOLONE ACETONIDE: 1 CREAM TOPICAL at 08:32

## 2024-10-07 RX ADMIN — METOCLOPRAMIDE 10 MG: 5 INJECTION, SOLUTION INTRAMUSCULAR; INTRAVENOUS at 11:51

## 2024-10-07 RX ADMIN — METOCLOPRAMIDE 10 MG: 5 INJECTION, SOLUTION INTRAMUSCULAR; INTRAVENOUS at 17:03

## 2024-10-07 RX ADMIN — AMPICILLIN SODIUM AND SULBACTAM SODIUM 3000 MG: 2; 1 INJECTION, POWDER, FOR SOLUTION INTRAMUSCULAR; INTRAVENOUS at 04:29

## 2024-10-07 RX ADMIN — HYDRALAZINE HYDROCHLORIDE 10 MG: 20 INJECTION INTRAMUSCULAR; INTRAVENOUS at 11:45

## 2024-10-07 RX ADMIN — ONDANSETRON 4 MG: 2 INJECTION INTRAMUSCULAR; INTRAVENOUS at 10:58

## 2024-10-07 RX ADMIN — METHIMAZOLE 10 MG: 10 TABLET ORAL at 08:31

## 2024-10-07 RX ADMIN — ENOXAPARIN SODIUM 30 MG: 100 INJECTION SUBCUTANEOUS at 08:27

## 2024-10-07 RX ADMIN — CARVEDILOL 6.25 MG: 6.25 TABLET, FILM COATED ORAL at 17:02

## 2024-10-07 RX ADMIN — AMPICILLIN SODIUM AND SULBACTAM SODIUM 3000 MG: 2; 1 INJECTION, POWDER, FOR SOLUTION INTRAMUSCULAR; INTRAVENOUS at 14:40

## 2024-10-07 RX ADMIN — ENOXAPARIN SODIUM 30 MG: 100 INJECTION SUBCUTANEOUS at 20:38

## 2024-10-07 ASSESSMENT — PAIN SCALES - GENERAL
PAINLEVEL_OUTOF10: 4
PAINLEVEL_OUTOF10: 0
PAINLEVEL_OUTOF10: 3

## 2024-10-07 NOTE — PROGRESS NOTES
[facility] Infectious Disease         Progress Note      Baylee Irizarry  MEDICAL RECORD NUMBER:  [id number]  AGE: 69 y.o.   GENDER: female  : 1955  EPISODE DATE:  2024      Assessment:     Pt is a 69yof who I am consulted for intra-abdominal abscess. Currently on therapy with pip/tazo.      The fluid collections in the abdomen near mid line pelvic as well as mid sigmoid have both increased in size. The sigmoid collection is fairly small and the location of the midline collection is fairly close in proximity to bladder. They both may be difficult to access. While on empiric therapy, the size has increased which is concerning for 3 possibilities. 1. The collection is not being penetrated by antibiotics (most likely). 2. The organisms present in the collection are discordant (resistant) to antibiotic coverage (alternative may be ceftriaxone or FQ paired with clarke). The issue should not be coverage in my opinion as this regimen should adequately cover most of the enterobacteriaceae and anaerobes that would be involved.      - Currently on amp/sulbactam   - Remains on liquid diet, would defer starting oral antibiotics  - If transition to oral and doing well on unasyn, could make switch to augmentin 875 bid  - Would treat with 4-5 days of antibiotics following surgical drainage procedure  - Pt has completed 10 days of therapy for diverticulitis   - EOT 10/7   - Complete antimicrobials today    Subjective:     Chief Complaint   Patient presents with    Fatigue    Emesis         Pt resting comfortably in bed. She has no complaints or concerns this morning other than limited diet. She underwent surgical drainage on 10/3.     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

## 2024-10-07 NOTE — PLAN OF CARE
Problem: Chronic Conditions and Co-morbidities  Goal: Patient's chronic conditions and co-morbidity symptoms are monitored and maintained or improved  Outcome: Progressing  Flowsheets (Taken 10/6/2024 2025)  Care Plan - Patient's Chronic Conditions and Co-Morbidity Symptoms are Monitored and Maintained or Improved: Monitor and assess patient's chronic conditions and comorbid symptoms for stability, deterioration, or improvement     Problem: Pain  Goal: Verbalizes/displays adequate comfort level or baseline comfort level  Outcome: Progressing     Problem: Gastrointestinal - Adult  Goal: Minimal or absence of nausea and vomiting  Outcome: Progressing

## 2024-10-07 NOTE — PROGRESS NOTES
[facility]  INPATIENT PHYSICAL THERAPY  DAILY NOTE  STRZ ONC MED 5K - 5K-11/011-A      Discharge Recommendations: Continue to assess pending progress and Patient would benefit from continued PT at discharge  Equipment Recommendations: No (has RW)               Time In: 1340  Time Out: 1403  Timed Code Treatment Minutes: 23 Minutes  Minutes: 23          Date: 10/7/2024  Patient Name: Baylee Irizarry,  Gender:  female        MRN: [id number]  : 1955  (69 y.o.)     Referring Practitioner: Jenna Wilcox PA  Diagnosis: Diverticulitis  Additional Pertinent Hx: Per EMR:Baylee Irizarry is a 69 y.o. female with PMHx of Anemia, Anxiety, Asthma, CHF, COPD, MDD, DM2, HA, Lupus, HTN, Obesity, RIRI who presents to [facility] ED with CC:1 month emesis and difficulty eating. On  given potassium at [facility], PCP advised her to come in. C/o abdominal soreness. Pain 7/10,   [facility] said thyroid problem. Denied fever no chills, feels cold. Denies urinary complaints,   On interview today patient states that she has been unable to keep much down for the past month.  She says pretty much all she can take in is 7 up.  She has been unable to take medications.  As such presented with BP significantly elevated.  Endorses nausea, vomiting, says she is always cold but denies fever or chills, she has no urinary complaints but does endorse that she has had frequent UTIs in the last 6 months.  Patient goes to [facility] for most care.  Denies CP but does endorse palpitations which happen occasionally.  Nausea and vomiting is her biggest complaint at this time. Pt underwent EGD on  with showed gastritis. Per notes,Patient with Pericolonic fluid collection, possible abscess, diverticulitis. s/p ROBOTIC DRAINAGE PELVIC ABSCESS (Abdomen) 10/3     Prior Level of Function:  Lives With: Significant other (ex-)  Type of Home: House  Home Layout: One level  Home Access: Ramped entrance (Pt reports ramp is not very stable)  Home

## 2024-10-07 NOTE — PROGRESS NOTES
[facility]  STRZ ONC MED 5K  Occupational Therapy  Daily Note    Discharge Recommendations: Home with Home Exercise Program  Equipment Recommendations:   Pt has a shower chair, elevated toilet with armrests and plans to sleep in her lift chair at home.      Time In: 953  Time Out: 1020  Timed Code Treatment Minutes: 27 Minutes  Minutes: 27          Date: 10/7/2024  Patient Name: Baylee Irizarry,   Gender: female      [facility]  MRN: [id number]  : 1955  (69 y.o.)  Referring Practitioner: Shun Mejia MD  Diagnosis: Diverticulitis  Additional Pertinent Hx: Per EMR:Baylee Irizarry is a 69 y.o. female with PMHx of Anemia, Anxiety, Asthma, CHF, COPD, MDD, DM2, HA, Lupus, HTN, Obesity, RIRI who presents to [facility] ED with CC:1 month emesis and difficulty eating. On  given potassium at [facility], PCP advised her to come in. C/o abdominal soreness. Pain 7/10,   [facility] said thyroid problem. Denied fever no chills, feels cold. Denies urinary complaints,   On interview today patient states that she has been unable to keep much down for the past month.  She says pretty much all she can take in is 7 up.  She has been unable to take medications.  As such presented with BP significantly elevated.  Endorses nausea, vomiting, says she is always cold but denies fever or chills, she has no urinary complaints but does endorse that she has had frequent UTIs in the last 6 months.  Patient goes to [facility] for most care.  Denies CP but does endorse palpitations which happen occasionally.  Nausea and vomiting is her biggest complaint at this time. Pt underwent EGD on  with showed gastritis. Per notes,Patient with Pericolonic fluid collection, possible abscess, diverticulitis. s/p ROBOTIC DRAINAGE PELVIC ABSCESS (Abdomen) 10/3    Restrictions/Precautions:  Restrictions/Precautions: General Precautions, Modified Diet  Position Activity Restriction  Other position/activity restrictions: Pt refused to

## 2024-10-07 NOTE — PROGRESS NOTES
Continue home Plaquenil 200 qd.  Morbid obesity. BMI 52.47.      LDA: []CVC / [x]PICC / []Midline / []Quick / []Drains / []Mediport / []None  Antibiotics: unasyn  Steroids: None   Labs (still needed?): [x]Yes / []No  IVF (still needed?): []Yes / [x]No    Level of care: []Step Down / [x]Med-Surg  Bed Status: [x]Inpatient / []Observation  Telemetry: [x]Yes / []No  PT/OT: [x]Yes / []No    DVT Prophylaxis: [x] Lovenox / [] Heparin / [] SCDs / [] Already on Systemic Anticoagulation / [] None   Code status: Full Code     Expected discharge date:  Unknown   Disposition: Home      ===================================================================    Chief Complaint: Fatigue & Emesis     Subjective (past 24 hours):    at bedside- she states she is feeling much better today. Some nausea after eating sausage this AM. No vomiting.       HPI / Hospital Course:  Initial H&P \"Baylee Irizarry is a 69 y.o. female with PMHx of Anemia, Anxiety, Asthma, CHF, COPD, MDD, DM2, HA, Lupus, HTN, Obesity, RIRI who presents to [facility] ED with CC:1 month emesis and difficulty eating. On 9/23 given potassium at [facility], PCP advised her to come in. C/o abdominal soreness. Pain 7/10,   [facility] said thyroid problem. Denied fever no chills, feels cold. Denies urinary complaints,   On interview today patient states that she has been unable to keep much down for the past month.  She says pretty much all she can take in is 7 up.  She has been unable to take medications.  As such presented with BP significantly elevated.  Endorses nausea, vomiting, says she is always cold but denies fever or chills, she has no urinary complaints but does endorse that she has had frequent UTIs in the last 6 months.  Patient goes to [facility] for most care.  Denies CP but does endorse palpitations which happen occasionally.  Nausea and vomiting is her biggest complaint at this time.     ED course: T 98 RR 18 HR 85 /68 SpO2 98 on RA.   Labs: BMP glucose 139 LFT Alb 2.7   AST 41 Bili 1.3  TSH 0.005  WBC 12.2 Hgb 11.6 Plt 245 Neuts 9.5     Urine Many bacteria.   CT showed bladder wall thickening perivesicular fat stranding. GI Colonic diverticulitis w/ pericolonic fat stranding. Also ill defined collection of fluid.\"     Medications:    Infusion Medications    PN-Adult  3 IN 1 Central Line (Custom) 45 mL/hr at 10/07/24 1148    sodium chloride 25 mL/hr at 10/02/24 0958    dextrose      Scheduled Medications    ampicillin-sulbactam  3,000 mg IntraVENous Q6H    triamcinolone   Topical BID    sucralfate  1 g Oral 4x Daily AC & HS    pantoprazole (PROTONIX) 40 mg in sodium chloride (PF) 0.9 % 10 mL injection  40 mg IntraVENous Daily    metoclopramide  10 mg IntraVENous Q6H    sodium chloride flush  5-40 mL IntraVENous 2 times per day    diclofenac sodium  2 g Topical BID    hydroxychloroquine  200 mg Oral Daily    lisinopril  40 mg Oral Daily    montelukast  10 mg Oral Nightly    methIMAzole  10 mg Oral TID    enoxaparin  30 mg SubCUTAneous BID    insulin lispro  0-4 Units SubCUTAneous Q6H    PRN Meds: HYDROmorphone **OR** HYDROmorphone, promethazine, hydrALAZINE, calcium carbonate, sodium chloride flush, sodium chloride, potassium chloride **OR** potassium alternative oral replacement **OR** potassium chloride, magnesium sulfate, ondansetron **OR** ondansetron, polyethylene glycol, acetaminophen **OR** acetaminophen, prochlorperazine, albuterol, glucose, dextrose bolus **OR** dextrose bolus, glucagon (rDNA), dextrose    Exam:  BP (!) 164/84   Pulse 90   Temp 98.1 °F (36.7 °C) (Oral)   Resp 16   Ht 1.575 m (5' 2\")   Wt (!) 140.8 kg (310 lb 6.5 oz)   SpO2 96%   BMI 56.77 kg/m²   General appearance: No apparent distress, appears stated age and cooperative. Morbidly obese.   HEENT: Pupils equal, round, and reactive to light. Conjunctivae/corneas clear.  Neck: Supple, with full range of motion. No jugular venous distention. Trachea midline.  Respiratory:  Normal respiratory

## 2024-10-07 NOTE — PLAN OF CARE
Problem: Chronic Conditions and Co-morbidities  Goal: Patient's chronic conditions and co-morbidity symptoms are monitored and maintained or improved  10/7/2024 1230 by Hailey Gonzalez RN  Outcome: Progressing  Flowsheets (Taken 10/7/2024 1230)  Care Plan - Patient's Chronic Conditions and Co-Morbidity Symptoms are Monitored and Maintained or Improved:   Monitor and assess patient's chronic conditions and comorbid symptoms for stability, deterioration, or improvement   Collaborate with multidisciplinary team to address chronic and comorbid conditions and prevent exacerbation or deterioration     Problem: Pain  Goal: Verbalizes/displays adequate comfort level or baseline comfort level  10/7/2024 1230 by Hailey Gonzalez RN  Outcome: Progressing  Flowsheets (Taken 10/7/2024 1230)  Verbalizes/displays adequate comfort level or baseline comfort level:   Encourage patient to monitor pain and request assistance   Assess pain using appropriate pain scale   Administer analgesics based on type and severity of pain and evaluate response   Implement non-pharmacological measures as appropriate and evaluate response     Problem: Safety - Adult  Goal: Free from fall injury  10/7/2024 1230 by Hailey oGnzalez RN  Outcome: Progressing  Flowsheets (Taken 10/7/2024 1230)  Free From Fall Injury: Instruct family/caregiver on patient safety     Problem: Skin/Tissue Integrity  Goal: Absence of new skin breakdown  Description: 1.  Monitor for areas of redness and/or skin breakdown  2.  Assess vascular access sites hourly  3.  Every 4-6 hours minimum:  Change oxygen saturation probe site  4.  Every 4-6 hours:  If on nasal continuous positive airway pressure, respiratory therapy assess nares and determine need for appliance change or resting period.  10/7/2024 1230 by Hailey Gonzalez RN  Outcome: Progressing  Note: Skin assessment completed.  Patient turned every 2 hours and as needed.  No skin breakdown this shift.         Problem:

## 2024-10-07 NOTE — PROGRESS NOTES
Comprehensive Nutrition Assessment    Type and Reason for Visit:  Reassess    Nutrition Recommendations/Plan:   Recommend TPN weaning as pt. Tolerating solid foods - provider to advise.  Continue Ensure Clear ONS TID - pt. Declines trying Glucerna at present - \"sounds disgusting\".  Recommend diet as tolerated.  Encouraged po, good nutrition at best efforts.  Will monitor need for additional nutrition interventions, diet education, etc.     Malnutrition Assessment:  Malnutrition Status:  Moderate malnutrition (09/28/24 1303)    Context:  Chronic Illness     Findings of the 6 clinical characteristics of malnutrition:  Energy Intake:  75% or less estimated energy requirements for 1 month or longer (Poor PO for the last ~1.5-2 months; on going N/V)  Weight Loss:   (reports losing ~100# in the last year unintentionally; 40-50# recently per pt recall; states she was recently in 300s and now 282# per rough estimate bedscale wt today; note edema and hx/o CHF as well)     Body Fat Loss:  No significant body fat loss     Muscle Mass Loss:  No significant muscle mass loss    Fluid Accumulation:  Mild Generalized   Strength:  Not Performed    Nutrition Assessment:      Remains at risk for further nutritional compromise r/t altered GI function; admit with N/V ongoing for last month or two, hypokalemia 2/2 GI losses, UTI, pericolonic fluid collection - possible abscess - diverticulitis, suspected gastroparesis, leukocytosis suspected d/t N/V, gastritis and underlying medical condition (PMHx: anemia, anxiety, CHF, COPD, T2DM, HTN, lupus, obesity, RIRI, OA, former smoker).     Nutrition Related Findings:    Pt. Report/Treatments/Miscellaneous:   10/7: pt. Seen - reports ate some eggs, pancakes and hot chocolate this am; denies any nausea, abdominal pain; offered change to Glucerna ONS (lower CHO) as diet has been advanced however pt. Declines; states \"that sounds disgusting\" and would like to stay on the Ensure Clear; sent  5 pant sizes and feels that most recently her N/V resulted in 40-50# weight loss. Pt aware of plan for PN initiation d/t GI function at this time; pt declining PPN start until PICC placed - educated pt on the difference and pt still not in agreement until PICC placed. Encouraged pt to think about it.   GI Status: BM 10/6  Pertinent Labs: 10/7: Glucose 121, BUN 15, Cr 0.3, Potassium 4.1, Magnesium 1.8, Phosphorus 3.6  Pertinent Meds: Reglan, Carafate, Insulin, PPI, Zofran      Wound Type: None       Current Nutrition Intake & Therapies:    Average Meal Intake: 26-50%, 51-75%  Average Supplements Intake:  (states acceptance of Ensure Clear; declines trying Glucerna at this time)  ADULT ORAL NUTRITION SUPPLEMENT; Breakfast, Lunch, Dinner; Clear Liquid Oral Supplement  PN-Adult  3 IN 1 Central Line (Custom)  ADULT DIET; Dysphagia - Soft and Bite Sized  Current Parenteral Nutrition Orders:  Type and Formula: 3-in-1 Custom (dosing wt: 70 kgm, 20 kcals/kgm, 1.5 gm protein/kgm and 30% kcals from lipids)   Lipids: Daily  Duration: Continuous  Rate/Volume: 90 ml/hr  Current PN Order Provides: 1400 kcals, 105 gm protein/kgm, 164.7 gm CHO, 42 gm lipids/day  Goal PN Orders Provides: next bag will provide pt. with 1400 kcals, 105 gm protein, 165 gm CHO, 42 gm lipids/day    Anthropometric Measures:  Height: 157.5 cm (5' 2\")  Ideal Body Weight (IBW): 110 lbs (50 kg)    Admission Body Weight: 130.6 kg (287 lb 14.7 oz) (9/26 generalized, +1 edema)  Current Body Weight: 140.8 kg (310 lb 6.5 oz) (10/7 +1 edema),       Current BMI (kg/m2): 56.8  Usual Body Weight:  (per pt was 300# \"recently\"; per EMR: 1/29/19: 390# 10 oz, 6/3/23: 348# 2 oz - [facility])     Weight Adjustment For: No Adjustment                 BMI Categories: Obese Class 3 (BMI 40.0 or greater)    Estimated Daily Nutrient Needs:  Energy Requirements Based On: Kcal/kg  Weight Used for Energy Requirements: Current (130.6 kg)  Energy (kcal/day): ~9667-4014 kcals (9-11

## 2024-10-07 NOTE — PROGRESS NOTES
Pharmacy Consult for TPN/PPN Monitoring & Adjustment  IV Access: central      Dosing weight: 70 kg  Current insulin regimen: low sliding scale  Diet (excluding TPN): dysphagia- soft and bite sized  Maintenance fluid: none    Plan:  See components of tonight's TPN bag in the table below:  Date 10/4/24 10/5/24 10/6/24 10/7/24   Total kcal/kg 20 20 20 20   Total kcal 1400 1400 1400 1400   Protein g/kg 1.5 1.5 1.5 1.5   Protein g 105 105 105 105   Protein kcal (4 kcal/g) 420 420 420 420   Lipid % 30 30 30 30   Lipid g 42 42 42 42   Lipid kcal (10 kcal/g) 420 420 420 420   Dextrose g 164.7 164.7 164.7 164.7   Dextrose kcal (3.4 kcal/g) 560 560 560 560   Infusion Rate (mL/hr) 90 90 90 45   Na Acetate (mEq) 100 100 100 100   Na Chloride (mEq) 100 100 100 100   Na Phos (mmol)  (3 mmol = 4 mEq Na) 0 0 0 0   Total Na (mEq) 200 200 200 200   K Acetate (mEq) 0 0 0 0   K Chloride (mEq) 0 0 0 0   K Phos (mmol)  (15 mmol = 22 mEq K) 25 25 25 25   Total K (mEq) 36.67 36.67 36.67 36.67   Ca Gluconate (mEq)  (1 g = 4.65 mEq) 4.65 4.65 4.65 4.65   Mag Sulfate (mEq)  (1 g = 8.12 mEq) 12.18 12.18 12.18 12.18   Multivitamin (mL) 10 0 0 10   Trace Elements (mL) 1 1 1 1       Summary of changes for tonight's TPN:   No macronutrient changes per dietary  Added multivitamin 10 ml (shortage- MWF only)  Decrease rate of infusion to 45 ml/hr (this will be the last bag)    Electrolyte Replacement:   none    Monitoring:  BMP, Mag, iCal, & Phos ordered for tomorrow with AM labs.    Pharmacy will continue to follow daily. Thank you for the consult.  Julianne Ruano, PharmD 10/7/2024 9:56 AM

## 2024-10-08 ENCOUNTER — HOSPITAL ENCOUNTER (EMERGENCY)
Age: 69
Discharge: HOME | End: 2024-10-09
Attending: EMERGENCY MEDICINE
Payer: MEDICARE

## 2024-10-08 VITALS
RESPIRATION RATE: 19 BRPM | BODY MASS INDEX: 53.92 KG/M2 | TEMPERATURE: 97.8 F | WEIGHT: 293 LBS | SYSTOLIC BLOOD PRESSURE: 172 MMHG | HEIGHT: 62 IN | DIASTOLIC BLOOD PRESSURE: 61 MMHG | HEART RATE: 86 BPM | OXYGEN SATURATION: 97 %

## 2024-10-08 VITALS
SYSTOLIC BLOOD PRESSURE: 157 MMHG | RESPIRATION RATE: 20 BRPM | HEIGHT: 62 IN | DIASTOLIC BLOOD PRESSURE: 67 MMHG | WEIGHT: 293 LBS | HEART RATE: 85 BPM | TEMPERATURE: 97.5 F | OXYGEN SATURATION: 92 % | BODY MASS INDEX: 53.92 KG/M2

## 2024-10-08 DIAGNOSIS — L76.82 OTHER POSTOPERATIVE COMPLICATION OF SUBCUTANEOUS TISSUE: Primary | ICD-10-CM

## 2024-10-08 LAB — GLUCOSE BLD STRIP.AUTO-MCNC: 91 MG/DL (ref 70–108)

## 2024-10-08 PROCEDURE — 6370000000 HC RX 637 (ALT 250 FOR IP): Performed by: SURGERY

## 2024-10-08 PROCEDURE — 99282 EMERGENCY DEPT VISIT SF MDM: CPT

## 2024-10-08 PROCEDURE — 6360000002 HC RX W HCPCS: Performed by: SURGERY

## 2024-10-08 PROCEDURE — 99233 SBSQ HOSP IP/OBS HIGH 50: CPT | Performed by: STUDENT IN AN ORGANIZED HEALTH CARE EDUCATION/TRAINING PROGRAM

## 2024-10-08 PROCEDURE — 97110 THERAPEUTIC EXERCISES: CPT

## 2024-10-08 PROCEDURE — 6370000000 HC RX 637 (ALT 250 FOR IP): Performed by: PHYSICIAN ASSISTANT

## 2024-10-08 PROCEDURE — 2500000003 HC RX 250 WO HCPCS

## 2024-10-08 PROCEDURE — 99239 HOSP IP/OBS DSCHRG MGMT >30: CPT | Performed by: PHYSICIAN ASSISTANT

## 2024-10-08 PROCEDURE — 97530 THERAPEUTIC ACTIVITIES: CPT

## 2024-10-08 PROCEDURE — 92526 ORAL FUNCTION THERAPY: CPT

## 2024-10-08 PROCEDURE — 82948 REAGENT STRIP/BLOOD GLUCOSE: CPT

## 2024-10-08 PROCEDURE — 2580000003 HC RX 258: Performed by: SURGERY

## 2024-10-08 RX ORDER — SUCRALFATE 1 G/1
1 TABLET ORAL
Qty: 150 TABLET | Refills: 3 | Status: SHIPPED | OUTPATIENT
Start: 2024-10-08

## 2024-10-08 RX ORDER — LISINOPRIL 40 MG/1
40 TABLET ORAL DAILY
Qty: 30 TABLET | Refills: 3 | Status: SHIPPED | OUTPATIENT
Start: 2024-10-09

## 2024-10-08 RX ORDER — CARVEDILOL 6.25 MG/1
6.25 TABLET ORAL 2 TIMES DAILY WITH MEALS
Qty: 60 TABLET | Refills: 3 | Status: SHIPPED | OUTPATIENT
Start: 2024-10-08

## 2024-10-08 RX ORDER — PANTOPRAZOLE SODIUM 40 MG/1
40 TABLET, DELAYED RELEASE ORAL
Qty: 30 TABLET | Refills: 3 | Status: SHIPPED | OUTPATIENT
Start: 2024-10-09

## 2024-10-08 RX ORDER — GUAIFENESIN 200 MG/10ML
200 LIQUID ORAL EVERY 4 HOURS PRN
Status: DISCONTINUED | OUTPATIENT
Start: 2024-10-08 | End: 2024-10-08 | Stop reason: HOSPADM

## 2024-10-08 RX ORDER — METHIMAZOLE 5 MG/1
15 TABLET ORAL 3 TIMES DAILY
Qty: 90 TABLET | Refills: 0 | Status: SHIPPED | OUTPATIENT
Start: 2024-10-08 | End: 2024-11-07

## 2024-10-08 RX ADMIN — GUAIFENESIN 200 MG: 200 SOLUTION ORAL at 00:37

## 2024-10-08 RX ADMIN — SODIUM CHLORIDE, PRESERVATIVE FREE 10 ML: 5 INJECTION INTRAVENOUS at 09:16

## 2024-10-08 RX ADMIN — CARVEDILOL 6.25 MG: 6.25 TABLET, FILM COATED ORAL at 09:16

## 2024-10-08 RX ADMIN — METHIMAZOLE 15 MG: 10 TABLET ORAL at 09:16

## 2024-10-08 RX ADMIN — ENOXAPARIN SODIUM 30 MG: 100 INJECTION SUBCUTANEOUS at 09:16

## 2024-10-08 RX ADMIN — LISINOPRIL 40 MG: 40 TABLET ORAL at 09:16

## 2024-10-08 RX ADMIN — METOCLOPRAMIDE 10 MG: 5 INJECTION, SOLUTION INTRAMUSCULAR; INTRAVENOUS at 10:53

## 2024-10-08 ASSESSMENT — PAIN DESCRIPTION - LOCATION: LOCATION: ABDOMEN

## 2024-10-08 ASSESSMENT — PAIN DESCRIPTION - DESCRIPTORS: DESCRIPTORS: SORE

## 2024-10-08 ASSESSMENT — PAIN - FUNCTIONAL ASSESSMENT: PAIN_FUNCTIONAL_ASSESSMENT: NONE - DENIES PAIN

## 2024-10-08 ASSESSMENT — PAIN SCALES - GENERAL: PAINLEVEL_OUTOF10: 2

## 2024-10-08 NOTE — PROGRESS NOTES
Order received for PICC removal per Samples, NIKKO Smith.  Patient placed in bed.  Transparent dressing removed. Skin accessed appears clean, dry and intact.  Sutures removed, area cleaned with chloro prep, sterile gauze and Vaseline gauze placed over site, PICC removed with tip intact, pressure held with sterile gauze for 3 minutes.  New transparent dressing applied.  Educated the patient on remaining in bed for 30 min, dressing stays on for 24 hours, signs and symptoms of infection and notify primary nurse if any blood or oozing. Latesha JOHNSTON notified.

## 2024-10-08 NOTE — PROGRESS NOTES
[facility] Infectious Disease         Progress Note      Baylee Irizarry  MEDICAL RECORD NUMBER:  [id number]  AGE: 69 y.o.   GENDER: female  : 1955  EPISODE DATE:  2024      Assessment:     Pt is a 69yof who I am consulted for intra-abdominal abscess.     - Pt has completed 10 days of therapy for diverticulitis and adequate therapy following drainage procedure   - EOT 10/7   - I have discontinued antimicrobials    Subjective:     Chief Complaint   Patient presents with    Fatigue    Emesis         Pt resting comfortably in bed. She has no complaints or concerns this morning other than limited diet. She underwent surgical drainage on 10/3.     She has completed antibiotic therapy and will be transitioning off of TPN.     Patient Active Problem List   Diagnosis Code    Controlled diabetes mellitus type 2 with complications (HCC) E11.8    HTN (hypertension) I10    Microcytic anemia D50.9    Severe persistent asthma J45.50    Diverticulitis K57.92    Hyperthyroidism E05.90    Morbid obesity E66.01    Diverticulitis of large intestine with abscess K57.20    Moderate malnutrition (HCC) E44.0    Acute cystitis without hematuria N30.00    Leukocytosis D72.829    Uncomplicated asthma J45.909    RIRI (obstructive sleep apnea) G47.33    Lupus QNE1433    Pelvic abscess in female N73.9             PAST MEDICAL HISTORY        Diagnosis Date    Anemia     Anxiety     Asthma     CHF (congestive heart failure) (HCC)     COPD (chronic obstructive pulmonary disease) (HCC)     CRP elevated     Depression     DM type 2 (diabetes mellitus, type 2) (HCC)     Headache(784.0)     HTN (hypertension)     Lupus     Obesity     RIRI (obstructive sleep apnea)     Osteoarthritis     Positive KODAK (antinuclear antibody)        PAST SURGICAL HISTORY    Past Surgical History:   Procedure Laterality Date     SECTION      FOOT SURGERY      HYSTERECTOMY (CERVIX STATUS UNKNOWN)      LAPAROSCOPY N/A 10/3/2024    ROBOTIC

## 2024-10-08 NOTE — CARE COORDINATION
10/8/24, 2:24 PM EDT    Patient goals/plan/ treatment preferences discussed by  and .  Patient goals/plan/ treatment preferences reviewed with patient/ family.  Patient/ family verbalize understanding of discharge plan and are in agreement with goal/plan/treatment preferences.  Understanding was demonstrated using the teach back method.  AVS provided by RN at time of discharge, which includes all necessary medical information pertaining to the patients current course of illness, treatment, post-discharge goals of care, and treatment preferences.     Services At/After Discharge: DME, Home Health, Nursing service, OT, and PT    Per patient preference from home health list, referral called to [facility]. Spoke to Patricia.     Wheeled walker ordered from [facility].

## 2024-10-08 NOTE — DISCHARGE INSTRUCTIONS
Please adhere to a low fiber diet for the next 2 weeks and then transition to a higher fiber diet.     You have completed all antibiotic therapy recommended at this time.     Please check your blood pressure daily and bring a diary of your readings to your follow up appointment with your primary care provider.

## 2024-10-08 NOTE — PLAN OF CARE
Problem: Chronic Conditions and Co-morbidities  Goal: Patient's chronic conditions and co-morbidity symptoms are monitored and maintained or improved  10/8/2024 1034 by Latesha Carnes RN  Outcome: Progressing  Flowsheets (Taken 10/8/2024 1034)  Care Plan - Patient's Chronic Conditions and Co-Morbidity Symptoms are Monitored and Maintained or Improved:   Monitor and assess patient's chronic conditions and comorbid symptoms for stability, deterioration, or improvement   Update acute care plan with appropriate goals if chronic or comorbid symptoms are exacerbated and prevent overall improvement and discharge   Collaborate with multidisciplinary team to address chronic and comorbid conditions and prevent exacerbation or deterioration     Problem: Pain  Goal: Verbalizes/displays adequate comfort level or baseline comfort level  10/8/2024 1034 by Latesha Carnes RN  Outcome: Progressing  Flowsheets (Taken 10/8/2024 1034)  Verbalizes/displays adequate comfort level or baseline comfort level:   Encourage patient to monitor pain and request assistance   Assess pain using appropriate pain scale     Problem: Safety - Adult  Goal: Free from fall injury  10/8/2024 1034 by Latesha Carnes, RN  Outcome: Progressing  Flowsheets (Taken 10/8/2024 1034)  Free From Fall Injury:   Instruct family/caregiver on patient safety   Based on caregiver fall risk screen, instruct family/caregiver to ask for assistance with transferring infant if caregiver noted to have fall risk factors     Problem: Skin/Tissue Integrity  Goal: Absence of new skin breakdown  Description: 1.  Monitor for areas of redness and/or skin breakdown  2.  Assess vascular access sites hourly  3.  Every 4-6 hours minimum:  Change oxygen saturation probe site  4.  Every 4-6 hours:  If on nasal continuous positive airway pressure, respiratory therapy assess nares and determine need for appliance change or resting period.  10/8/2024 1034 by Latesha Carnes, RN  Outcome:

## 2024-10-08 NOTE — PLAN OF CARE
Problem: Chronic Conditions and Co-morbidities  Goal: Patient's chronic conditions and co-morbidity symptoms are monitored and maintained or improved  10/7/2024 2218 by Ana Romero RN  Outcome: Progressing  Flowsheets (Taken 10/7/2024 1230 by Hailey Gonzalez RN)  Care Plan - Patient's Chronic Conditions and Co-Morbidity Symptoms are Monitored and Maintained or Improved:   Monitor and assess patient's chronic conditions and comorbid symptoms for stability, deterioration, or improvement   Collaborate with multidisciplinary team to address chronic and comorbid conditions and prevent exacerbation or deterioration     Problem: Pain  Goal: Verbalizes/displays adequate comfort level or baseline comfort level  10/7/2024 2218 by Ana Romero RN  Outcome: Progressing  Flowsheets (Taken 10/7/2024 2218)  Verbalizes/displays adequate comfort level or baseline comfort level:   Assess pain using appropriate pain scale   Encourage patient to monitor pain and request assistance     Problem: Safety - Adult  Goal: Free from fall injury  10/7/2024 2218 by Ana Romero RN  Outcome: Progressing  Flowsheets (Taken 10/7/2024 2218)  Free From Fall Injury: Based on caregiver fall risk screen, instruct family/caregiver to ask for assistance with transferring infant if caregiver noted to have fall risk factors     Problem: Skin/Tissue Integrity  Goal: Absence of new skin breakdown  Description: 1.  Monitor for areas of redness and/or skin breakdown  2.  Assess vascular access sites hourly  3.  Every 4-6 hours minimum:  Change oxygen saturation probe site  4.  Every 4-6 hours:  If on nasal continuous positive airway pressure, respiratory therapy assess nares and determine need for appliance change or resting period.  10/7/2024 2218 by Ana Romero RN  Outcome: Progressing     Problem: Gastrointestinal - Adult  Goal: Minimal or absence of nausea and vomiting  10/7/2024 2218 by Ana Romero RN  Outcome:  Progressing  Flowsheets (Taken 10/7/2024 2218)  Minimal or absence of nausea and vomiting: Administer IV fluids as ordered to ensure adequate hydration     Problem: Gastrointestinal - Adult  Goal: Maintains or returns to baseline bowel function  10/7/2024 2218 by Ana Romero, RN  Outcome: Progressing  Flowsheets (Taken 10/7/2024 2218)  Maintains or returns to baseline bowel function: Assess bowel function     Problem: Discharge Planning  Goal: Discharge to home or other facility with appropriate resources  10/7/2024 2218 by Ana Romero, RN  Outcome: Progressing  Flowsheets (Taken 10/7/2024 2218)  Discharge to home or other facility with appropriate resources:   Identify barriers to discharge with patient and caregiver   Identify discharge learning needs (meds, wound care, etc)     Problem: Nutrition Deficit:  Goal: Optimize nutritional status  10/7/2024 2218 by Ana Romero, RN  Outcome: Progressing  Flowsheets (Taken 10/7/2024 2218)  Nutrient intake appropriate for improving, restoring, or maintaining nutritional needs: Assess nutritional status and recommend course of action

## 2024-10-08 NOTE — DISCHARGE SUMMARY
[facility]  INPATIENT SPEECH THERAPY  STRZ ONC MED 5K  DISCHARGE NOTE    TIME   SLP Individual Minutes  Time In: 920  Time Out: 930  Minutes: 10  Timed Code Treatment Minutes: 0 Minutes       Date: 10/8/2024  Patient Name: Baylee Irizarry      CSN: [id number]   : 1955  (69 y.o.)  Gender: female   Referring Physician:  Mariza Gibson, APRN - CNP   Diagnosis: Diverticulitis   Precautions: Fall risk   Current Diet: Regular diet, thin liquids   Respiratory Status: Room Air  Swallowing Strategies: Standard Universal Swallow Precautions  Date of Last MBS/FEES: Not Applicable    Pain:  No pain reported.    Subjective:  Spoke with VICKIE Charlton for approval of ST interventions. Upon arrival, patient sitting upright in bed. Alert and cooperative.     Short-Term Goals:  SHORT TERM GOAL #1:  Goal 1: Patient will consume regular diet with thin liquids (as clinically/medically indicated) with stable pulmonary status and incorporation of trained compensatory strategies to assist with nutrition/hydration measures.  INTERVENTIONS: Patient consumed coarse texture trials with ST this date. Patient with appropriate mastication, bolus control, and manipulation across all trials. Low suspicion for presence of a pharyngeal dysphagia. No overt s/s aspiration exhibited across all consistencies/trials consumed, certainly not able to exclude pharyngeal phase dysfunction and/or airway invasion events without supportive imaging. Patient's swallow physiology does appear appropriate to support PO intake without distress with instrumental evaluation not warranted.     Recommend continuation of regular diet with thin liquids. No further ST services are warranted at this time r/t dysphagia management given baseline status of swallow function likely achieved; please re-consult should further needs be identified.      SHORT TERM GOAL #2:  Goal 2: Considerations for an instrumental evaluation should adverse changes be

## 2024-10-08 NOTE — DISCHARGE SUMMARY
Hospital Medicine Discharge Summary      Patient Identification:   Baylee Irizarry   : 1955  MRN: [id number]   Account: [id number]      Patient's PCP: Myriam Sun APRN - CNP    Admit Date: 2024     Discharge Date:   10/8/2024    Admitting Physician: KALIN Barnhart CNP     Discharging Physician Assistant: Sarah Alvarado PA-C     Discharge Diagnoses with Hospital Course :    Pericolonic pelvic abscess secondary to perforated diverticulitis:   s/p Robotic assisted drainage of pelvic abscess from perforated diverticulitis 10/3/2024 with Dr Jackie RUANO drain was placed and removed prior to DC    ID followed for antibiotic management- Zosyn -10/5 and Unasyn 10/5- 10/7-  no antibiotics on DC   Follow up with GI and General surgery in 1-2 week on discharge     Intractable n/v x 4 weeks- improved - secondary to gastritis  GI followed during admission    UGI  - normal contrast flow  EGD 2024 with gastritis in the body and antrum with few inflammatory polyps, no biopsy was obtained.    Given IV PPI while admitted and transitioned to oral prior to DC- continue      Uncontrolled Hypertension- improved  Coreg initiated on 10/7 with improvement in blood pressure- continue on DC   Continue lisinopril  Follow up with PCP and and BP diary encouraged     Moderate malnutrition.   PICC placed and TPN was given during admission started due to prolonged NPO status.   Dietician did follow   TPN was weaned and PICC removed prior to DC  Tolerating oral diet.  Recommendations for low fiber diet for next two weeks and then transition to high fiber diet        Hyperthyroidism, uncontrolled.   TSH 0.005, free T4 3.42, free T3 5.4   Increase tapazole to 15mg TID  Endocrinology was consulted but  We currently do not have any endocrine coverage.   Follow up on DC     Resolved Problems  Dysphagia. SLP seen and cleared for regular diet with thin liquid when she is able to  tolerate.  Hypokalemia due to GI loss, resolved with replacement. Mag noted to be 1.8.  Accelerated Hypertension, improved. Hx HTN: Lisinopril restarted. BP controlled.   Hyponatremia, resolved.  Leukocytosis:Likely due to n/v, resolved. 10.5 today. Afebrile.  UTI(poa):   E coli on urine culture- sensitive to Unasyn- therapy was completed   Chronic Conditions (reviewed and stable unless otherwise stated)  Microcytic anemia. Hbg 9.3. CBC pending.   T2DM: BS trend in goal. Accu-Cheks qid. Hypoglycemia protocol Low-dose SSI.  Asthma: Continue albuterol nebulizer q6h prn  RIRI: Continue home CPAP  Lupus: Continue home Plaquenil 200 qd.  Morbid obesity. BMI 52.47.    Exam:     Vitals:  Vitals:    10/07/24 1702 10/07/24 2015 10/08/24 0331 10/08/24 0827   BP: (!) 165/53 (!) 156/54 (!) 126/46 (!) 157/67   Pulse: 98 83 84 85   Resp:  17 18 20   Temp:  98.2 °F (36.8 °C) 98.1 °F (36.7 °C) 97.5 °F (36.4 °C)   TempSrc:  Oral Oral Oral   SpO2:  97% 98% 92%   Weight:       Height:         Weight: Weight - Scale: (!) 140.8 kg (310 lb 6.5 oz)     24 hour intake/output:  Intake/Output Summary (Last 24 hours) at 10/8/2024 1527  Last data filed at 10/8/2024 1340  Gross per 24 hour   Intake 550 ml   Output 90 ml   Net 460 ml       General appearance: No apparent distress, appears stated age and cooperative. Morbidly obese.   HEENT: Pupils equal, round, and reactive to light. Conjunctivae/corneas clear.  Neck: Supple, with full range of motion. No jugular venous distention. Trachea midline.  Respiratory:  Normal respiratory effort. Clear to auscultation, bilaterally without Rales/Wheezes/Rhonchi. Decreased breath sounds lower lobes   Cardiovascular: Regular rate and rhythm with normal S1/S2 without murmurs, rubs or gallops.  Abdomen: Soft, obese, non-distended with normal bowel sounds. Incisions c/d/I. ALDEN intact, minimal drainage- tenderness around ALDEN site- without erythema or discharge - nursing to remove prior to DC   Musculoskeletal:

## 2024-10-08 NOTE — PROGRESS NOTES
[facility]  OCCUPATIONAL THERAPY MISSED TREATMENT NOTE  [facility] ONC MED 5K  5K-11/011-A      Date: 10/8/2024  Patient Name: Baylee Irizarry        CSN: [id number]   : 1955  (69 y.o.)  Gender: female   Referring Practitioner: Shun Mejia MD  Diagnosis: Diverticulitis         REASON FOR MISSED TREATMENT:  Pt is preparing for discharge at this time.

## 2024-10-08 NOTE — PROGRESS NOTES
[facility]  INPATIENT PHYSICAL THERAPY  DAILY NOTE  STR[facility] ONC MED 5K - 5K-11/011-A      Discharge Recommendations: Home with Assist as Needed  Equipment Recommendations: No (has RW)               Time In: 1058  Time Out: 1124  Timed Code Treatment Minutes: 26 Minutes  Minutes: 26          Date: 10/8/2024  Patient Name: Baylee Irizarry,  Gender:  female        MRN: [id number]  : 1955  (69 y.o.)     Referring Practitioner: Jenna Wilcox PA  Diagnosis: Diverticulitis  Additional Pertinent Hx: Per EMR:Baylee Irizarry is a 69 y.o. female with PMHx of Anemia, Anxiety, Asthma, CHF, COPD, MDD, DM2, HA, Lupus, HTN, Obesity, IRRI who presents to [facility] ED with CC:1 month emesis and difficulty eating. On  given potassium at [facility], PCP advised her to come in. C/o abdominal soreness. Pain 7/10,   [facility] said thyroid problem. Denied fever no chills, feels cold. Denies urinary complaints,   On interview today patient states that she has been unable to keep much down for the past month.  She says pretty much all she can take in is 7 up.  She has been unable to take medications.  As such presented with BP significantly elevated.  Endorses nausea, vomiting, says she is always cold but denies fever or chills, she has no urinary complaints but does endorse that she has had frequent UTIs in the last 6 months.  Patient goes to [facility] for most care.  Denies CP but does endorse palpitations which happen occasionally.  Nausea and vomiting is her biggest complaint at this time. Pt underwent EGD on  with showed gastritis. Per notes,Patient with Pericolonic fluid collection, possible abscess, diverticulitis. s/p ROBOTIC DRAINAGE PELVIC ABSCESS (Abdomen) 10/3     Prior Level of Function:  Lives With: Significant other (ex-)  Type of Home: House  Home Layout: One level  Home Access: Ramped entrance (Pt reports ramp is not very stable)  Home Equipment: Walker - Rolling, Electric scooter, Lift chair (stand  hip adduction, and Seated abduction/adduction.  Exercises were completed for increased independence with functional mobility.    Functional Outcome Measures:  [facility] (6 CLICK) BASIC MOBILITY     AM-PAC Inpatient Mobility without Stair Climbing Raw Score : 15  AM-PAC Inpatient without Stair Climbing T-Scale Score : 43.03     Modified Edmond Scale:  Not Applicable    ASSESSMENT:  Assessment: Patient progressing toward established goals.  Activity Tolerance:  Patient tolerance of  treatment:Good.  Plan: Current Treatment Recommendations: Strengthening, Balance training, Functional mobility training, Transfer training, Gait training, Safety education & training, Patient/Caregiver education & training, Therapeutic activities  General Plan:  (5x)    Education:  Learners: Patient  Patient Education: Plan of Care, Transfers, Gait, Verbal Exercise Instruction, Home Safety Education    Goals:  Patient Goals : Pt goal to go home and increase strength  Short Term Goals  Time Frame for Short Term Goals: By discharge  Short Term Goal 1: Supine to/from sit at Mod I in order to get in/out of bed.  Short Term Goal 2: Sit to/from stand at Mod I in order to get up to walk.  Short Term Goal 3: Ambulate 150 feet with RW at Mod I in order to walk in home safely.  Long Term Goals  Time Frame for Long Term Goals : NA due to short ELOS    Following session, patient left in safe position with all fall risk precautions in place.

## 2024-10-08 NOTE — PROGRESS NOTES
Comprehensive Nutrition Assessment    Type and Reason for Visit:  Reassess    Nutrition Recommendations/Plan:   Recommend diet as GI function allows, per SLP.  Declines having diabetic diet added to diet order - states knows what to choose.  Continue Ensure Clear ONS - pt. Declines Glucerna ONS.  Encouraged po, good nutrition at best efforts.  Discussed small, frequent meals while healing/recovery.  Discussed lower CHO ONS to trial; pt. States would like to continue Ensure Clear and will give herself additional insulin.     Malnutrition Assessment:  Malnutrition Status:  Moderate malnutrition (09/28/24 1303)    Context:  Chronic Illness     Findings of the 6 clinical characteristics of malnutrition:  Energy Intake:  75% or less estimated energy requirements for 1 month or longer (Poor PO for the last ~1.5-2 months; on going N/V)  Weight Loss:   (reports losing ~100# in the last year unintentionally; 40-50# recently per pt recall; states she was recently in 300s and now 282# per rough estimate bedscale wt today; note edema and hx/o CHF as well)     Body Fat Loss:  No significant body fat loss     Muscle Mass Loss:  No significant muscle mass loss    Fluid Accumulation:  Mild Generalized   Strength:  Not Performed    Nutrition Assessment:     Remains at risk for further nutritional compromise r/t altered GI function; admit with N/V ongoing for last month or two, hypokalemia 2/2 GI losses, UTI, pericolonic fluid collection - possible abscess - diverticulitis, suspected gastroparesis, leukocytosis suspected d/t N/V, gastritis and underlying medical condition (PMHx: anemia, anxiety, CHF, COPD, T2DM, HTN, lupus, obesity, RIRI, OA, former smoker).     Nutrition Related Findings:    Pt. Report/Treatments/Miscellaneous:   10/8- pt. Seen - denies any nausea or belly pain, passing gas; states belly is sore from surgery; reports tolerating diet; states acceptance of Ensure Clear; TPN discontinued last night per pt; reports

## 2024-10-08 NOTE — PROGRESS NOTES
Patient discharged at this time. All IV's removed. Discharge instructions, medication changes and follow up appointments explained at this time. All questions answered at this time. AVS given to patient and reviewed with this RN. Educated patient to  new prescriptions at pharmacy. All patient belongings returned. Chart broken down and placed in yellow bin.

## 2024-10-08 NOTE — CARE COORDINATION
Baylee Irizarry was evaluated today and a DME order was entered for a wheeled walker because she requires this to successfully complete daily living tasks of eating, bathing, toileting, personal cares, ambulating, grooming, hygiene, dressing upper body, dressing lower body, meal preparation, and taking own medications.  A wheeled walker is necessary due to the patient's unsteady gait, upper body weakness, and inability to  an ambulation device; and she can ambulate only by pushing a walker instead of a lesser assistive device such as a cane, crutch, or standard walker.  The need for this equipment was discussed with the patient and she understands and is in agreement.

## 2024-10-09 NOTE — ED PROVIDER NOTES
blank)      DISCHARGE PRESCRIPTIONS: (None if blank)  Discharge Medication List as of 10/9/2024  1:06 AM          FINAL IMPRESSION      1. Other postoperative complication of subcutaneous tissue          DISPOSITION/PLAN   DISPOSITION Decision To Discharge 10/09/2024 12:55:21 AM  Condition at Disposition: Data Unavailable      OUTPATIENT FOLLOW UP THE PATIENT:  Dino Myriam E, APRN - CNP  [address] [address]  [phone]    Schedule an appointment as soon as possible for a visit   If symptoms worsen      MD Jovi REAL Danielle S, MD  10/09/24 0700

## 2024-10-09 NOTE — ED TRIAGE NOTES
Pt present to ED from home with c/c of joan drain leaking from site. Pt states she had her tube removed today and has been leaking since. Pt denies any pain. Rr easy and unlabored. No distress noted.

## 2024-10-09 NOTE — DISCHARGE INSTRUCTIONS
You were seen for drainage around a ALDEN drain site.  We have placed Steri-Strips over it to help keep the skin closed and prevent drainage.  It may still leak.  You may use gauze and other wound care items to keep the area covered.  If you develop any significant redness, thick purulent discharge, fever, or any other concerning symptoms please return to the emergency room for evaluation.  Otherwise please follow-up with your surgeon as scheduled.  
Yes

## 2024-10-20 NOTE — PROGRESS NOTES
Physician Progress Note      PATIENT:               LETY GAYTAN  [facility] #:                  [id number]  :                       1955  ADMIT DATE:       2024 9:34 AM  DISCH DATE:        10/8/2024 3:28 PM  RESPONDING  PROVIDER #:        Sarah Sparks PA-C          QUERY TEXT:    Patient was admitted with Diverticulitis of large intestine with abscess.   Documentation reflects Diverticulitis with intra-abdominal abscess in consult   note on 10/1. If possible, please document in the progress notes and discharge   summary if Intra-abdominal abscess was:    The medical record reflects the following:  Risk Factors: Diverticulitis, Age 69 F  Clinical Indicators: 10/1 consult noted- Diverticulitis with intra-abdominal   abscess and I am consulted for intra-abdominal abscess. Currently on therapy   with pip/tazo.  10/5 PN noted - Liliana colonic fluid collection, possible abscess,   diverticulitis: POD2 s/p Robotic assisted drainage of pelvic abscess from   perforated diverticulitis  10/7 PN noted - The fluid collections in the abdomen near midline pelvic as   well as mid sigmoid have both increased in size.  The sigmoid collection is   fairly small, and the location of the midline collection is fairly close in   proximity to bladder.  10/3 OP report noted - Other Findings: Small pelvic abscess between the   sigmoid colon and bladder,  Treatment: IV Zofran, IV Zosyn, ID consult.    Thanks,  Natalee de la torre-  Options provided:  -- Intra-abdominal abscess confirmed after study  -- Intra-abdominal abscess ruled out after study  -- Other - I will add my own diagnosis  -- Disagree - Not applicable / Not valid  -- Disagree - Clinically unable to determine / Unknown  -- Refer to Clinical Documentation Reviewer    PROVIDER RESPONSE TEXT:    Intra-abdominal abscess confirmed after study.    Query created by: Natalee De La Torre on 10/18/2024 6:04 AM      Electronically signed by:  Sarah Sparks PA-C

## 2024-10-23 PROBLEM — I50.9 ACUTE ON CHRONIC CONGESTIVE HEART FAILURE (HCC): Status: ACTIVE | Noted: 2024-10-23

## 2024-10-23 PROBLEM — R00.2 PALPITATIONS: Status: ACTIVE | Noted: 2024-10-23

## 2024-10-23 PROBLEM — L02.216 ABSCESS OF UMBILICUS: Status: ACTIVE | Noted: 2024-10-23

## 2024-10-27 PROBLEM — Z09 POSTOP CHECK: Status: ACTIVE | Noted: 2024-10-27

## 2024-10-27 NOTE — PROGRESS NOTES
[place]    Shun Mejia MD FACS  General Surgery  Postprocedure Evaluation in Office  Pt Name: Baylee Irizarry  Date of Birth 1955   Today's Date: 10/28/2024  Medical Record Number: [id number]  Referring Provider: No ref. provider found  Primary Care Provider: Myriam Sun APRN - CNP  Chief Complaint   Patient presents with    Post-Op Check     S/p Robotic assisted drainage of pelvic abscess from perforated diverticulitis 10/03/24-In ER 10/08/24-Drainage from ALDEN site     ASSESSMENT      Problem List Items Addressed This Visit       Pelvic abscess in female - Primary    Postop check        PLAN   Assessment & Plan     Biggest risk for perforation is is 1st episode which is what she had, so future risk of perforated diverticulitis is lower.  She is working with her PCP on returning to work and they were not talking about revisiting this on November 14 so organ to leave it up to her PCP to manage this portion  Pathology reviewed with the patient who understands. All questions were answered.  There are no Patient Instructions on file for this visit.    Follow up: Return if symptoms worsen or fail to improve.  Orders Placed This Encounter:  No orders of the defined types were placed in this encounter.        SUBJECTIVE      Baylee is seen today for post-op follow-up. She is 25 day(s) status post robotic assisted draianage pelvic abscess for undrainable by IR abscess. She finished her antibiotics She is tolerating a regular diet, having regular bowel movements. Symptoms and activity have gradually improved compared to preoperative. The surgical site is clean and has no drainage. Pain is controlled without any medications.. She has compliant with postoperative instructions.  Past Medical History  Past Medical History:   Diagnosis Date    Anemia     Anxiety     Asthma     CHF (congestive heart failure) (HCC)     COPD (chronic obstructive pulmonary disease) (HCC)     CRP elevated

## 2024-10-28 ENCOUNTER — OFFICE VISIT (OUTPATIENT)
Dept: SURGERY | Age: 69
End: 2024-10-28

## 2024-10-28 VITALS
TEMPERATURE: 97.2 F | DIASTOLIC BLOOD PRESSURE: 78 MMHG | HEIGHT: 62 IN | WEIGHT: 273 LBS | SYSTOLIC BLOOD PRESSURE: 144 MMHG | RESPIRATION RATE: 18 BRPM | OXYGEN SATURATION: 98 % | BODY MASS INDEX: 50.24 KG/M2 | HEART RATE: 96 BPM

## 2024-10-28 DIAGNOSIS — N73.9 PELVIC ABSCESS IN FEMALE: Primary | ICD-10-CM

## 2024-10-28 DIAGNOSIS — Z09 POSTOP CHECK: ICD-10-CM

## 2024-10-28 PROCEDURE — 99024 POSTOP FOLLOW-UP VISIT: CPT | Performed by: SURGERY

## 2024-10-28 RX ORDER — FLUTICASONE PROPIONATE 50 MCG
SPRAY, SUSPENSION (ML) NASAL
COMMUNITY
Start: 2024-10-21

## 2024-10-28 RX ORDER — LOSARTAN POTASSIUM 25 MG/1
25 TABLET ORAL DAILY
COMMUNITY

## 2024-11-15 ENCOUNTER — OFFICE VISIT (OUTPATIENT)
Age: 69
End: 2024-11-15

## 2024-11-15 VITALS
HEART RATE: 81 BPM | BODY MASS INDEX: 49.65 KG/M2 | WEIGHT: 269.8 LBS | HEIGHT: 62 IN | SYSTOLIC BLOOD PRESSURE: 142 MMHG | DIASTOLIC BLOOD PRESSURE: 82 MMHG

## 2024-11-15 DIAGNOSIS — E05.90 HYPERTHYROIDISM: Primary | ICD-10-CM

## 2024-11-15 DIAGNOSIS — E04.2 MULTINODULAR GOITER: ICD-10-CM

## 2024-11-15 RX ORDER — METHIMAZOLE 10 MG/1
TABLET ORAL
COMMUNITY

## 2024-11-15 NOTE — PROGRESS NOTES
Standing Status:   Future     Standing Expiration Date:   11/16/2025    Hepatic Function Panel     Standing Status:   Future     Standing Expiration Date:   2/15/2025    TSH     Standing Status:   Future     Standing Expiration Date:   2/15/2025    T4, Free     Standing Status:   Future     Standing Expiration Date:   2/15/2025    T3, Free     Standing Status:   Future     Standing Expiration Date:   2/15/2025    Thyroid Stimulating Immunoglobulin     Standing Status:   Future     Standing Expiration Date:   2/15/2025    Sedimentation Rate     Standing Status:   Future     Standing Expiration Date:   2/15/2025    CBC with Auto Differential     Standing Status:   Future     Standing Expiration Date:   11/15/2025     The risks and benefits of my recommendations, as well as other treatment options were discussed with the patient today. Questions were answered.  Follow up: 3 months and as needed.         Electronically signed by oLuie Webster MD 11/15/2024 1:24 PM     **This report has been created using voice recognition software. It may   contain minor errors which are inherent in voice recognition technology.*

## 2024-11-18 ENCOUNTER — LAB (OUTPATIENT)
Dept: LAB | Age: 69
End: 2024-11-18

## 2024-11-18 DIAGNOSIS — E05.90 HYPERTHYROIDISM: ICD-10-CM

## 2024-11-18 LAB
ALBUMIN SERPL BCG-MCNC: 3.3 G/DL (ref 3.5–5.1)
ALP SERPL-CCNC: 126 U/L (ref 38–126)
ALT SERPL W/O P-5'-P-CCNC: 25 U/L (ref 11–66)
AST SERPL-CCNC: 34 U/L (ref 5–40)
BASOPHILS ABSOLUTE: 0 THOU/MM3 (ref 0–0.1)
BASOPHILS NFR BLD AUTO: 0.2 %
BILIRUB CONJ SERPL-MCNC: 0.2 MG/DL (ref 0.1–13.8)
BILIRUB SERPL-MCNC: 0.6 MG/DL (ref 0.3–1.2)
DEPRECATED RDW RBC AUTO: 48.6 FL (ref 35–45)
EOSINOPHIL NFR BLD AUTO: 0 %
EOSINOPHILS ABSOLUTE: 0 THOU/MM3 (ref 0–0.4)
ERYTHROCYTE [DISTWIDTH] IN BLOOD BY AUTOMATED COUNT: 18.1 % (ref 11.5–14.5)
ERYTHROCYTE [SEDIMENTATION RATE] IN BLOOD BY WESTERGREN METHOD: 24 MM/HR (ref 0–20)
HCT VFR BLD AUTO: 41.6 % (ref 37–47)
HGB BLD-MCNC: 12.7 GM/DL (ref 12–16)
IMM GRANULOCYTES # BLD AUTO: 0.03 THOU/MM3 (ref 0–0.07)
IMM GRANULOCYTES NFR BLD AUTO: 0.5 %
LYMPHOCYTES ABSOLUTE: 1.9 THOU/MM3 (ref 1–4.8)
LYMPHOCYTES NFR BLD AUTO: 29 %
MCH RBC QN AUTO: 23.6 PG (ref 26–33)
MCHC RBC AUTO-ENTMCNC: 30.5 GM/DL (ref 32.2–35.5)
MCV RBC AUTO: 77.2 FL (ref 81–99)
MONOCYTES ABSOLUTE: 0.4 THOU/MM3 (ref 0.4–1.3)
MONOCYTES NFR BLD AUTO: 6.6 %
NEUTROPHILS ABSOLUTE: 4.2 THOU/MM3 (ref 1.8–7.7)
NEUTROPHILS NFR BLD AUTO: 63.7 %
NRBC BLD AUTO-RTO: 0 /100 WBC
PLATELET # BLD AUTO: 216 THOU/MM3 (ref 130–400)
PMV BLD AUTO: 12.7 FL (ref 9.4–12.4)
PROT SERPL-MCNC: 6.6 G/DL (ref 6.1–8)
RBC # BLD AUTO: 5.39 MILL/MM3 (ref 4.2–5.4)
T3FREE SERPL-MCNC: 4.2 PG/ML (ref 2–4.4)
T4 FREE SERPL-MCNC: 1.33 NG/DL (ref 0.93–1.68)
TSH SERPL DL<=0.005 MIU/L-ACNC: 0.01 UIU/ML (ref 0.4–4.2)
WBC # BLD AUTO: 6.6 THOU/MM3 (ref 4.8–10.8)

## 2024-11-20 LAB — TSI SER-ACNC: 10.6 IU/L

## 2024-11-26 ENCOUNTER — TELEPHONE (OUTPATIENT)
Age: 69
End: 2024-11-26

## 2024-11-26 PROBLEM — Z09 POSTOP CHECK: Status: RESOLVED | Noted: 2024-10-27 | Resolved: 2024-11-26

## 2024-11-26 NOTE — TELEPHONE ENCOUNTER
----- Message from Dr. Louie Webster MD sent at 11/22/2024  4:48 PM EST -----  Labs are consistent with Graves' disease.  Repeat free T4, free T3 and TSH in 6 weeks.

## 2024-12-03 RX ORDER — METHIMAZOLE 10 MG/1
TABLET ORAL
Status: CANCELLED | OUTPATIENT
Start: 2024-12-03

## 2024-12-06 DIAGNOSIS — E05.90 HYPERTHYROIDISM: Primary | ICD-10-CM

## 2024-12-06 RX ORDER — METHIMAZOLE 10 MG/1
TABLET ORAL
Qty: 60 TABLET | Refills: 2 | Status: SHIPPED | OUTPATIENT
Start: 2024-12-06

## 2024-12-28 ENCOUNTER — HOSPITAL ENCOUNTER (EMERGENCY)
Age: 69
Discharge: HOME | End: 2024-12-28
Attending: STUDENT IN AN ORGANIZED HEALTH CARE EDUCATION/TRAINING PROGRAM
Payer: MEDICARE

## 2024-12-28 ENCOUNTER — APPOINTMENT (OUTPATIENT)
Dept: CT IMAGING | Age: 69
End: 2024-12-28
Payer: MEDICARE

## 2024-12-28 VITALS
HEIGHT: 62 IN | DIASTOLIC BLOOD PRESSURE: 85 MMHG | TEMPERATURE: 97.4 F | WEIGHT: 260 LBS | SYSTOLIC BLOOD PRESSURE: 142 MMHG | HEART RATE: 90 BPM | OXYGEN SATURATION: 96 % | RESPIRATION RATE: 18 BRPM | BODY MASS INDEX: 47.84 KG/M2

## 2024-12-28 DIAGNOSIS — R18.8 PELVIC FLUID COLLECTION: ICD-10-CM

## 2024-12-28 DIAGNOSIS — R10.32 ABDOMINAL PAIN, LEFT LOWER QUADRANT: Primary | ICD-10-CM

## 2024-12-28 LAB
ALBUMIN SERPL BCG-MCNC: 3.6 G/DL (ref 3.5–5.1)
ALP SERPL-CCNC: 157 U/L (ref 38–126)
ALT SERPL W/O P-5'-P-CCNC: 13 U/L (ref 11–66)
ANION GAP SERPL CALC-SCNC: 14 MEQ/L (ref 8–16)
AST SERPL-CCNC: 18 U/L (ref 5–40)
BACTERIA URNS QL MICRO: ABNORMAL /HPF
BASOPHILS ABSOLUTE: 0 THOU/MM3 (ref 0–0.1)
BASOPHILS NFR BLD AUTO: 0.3 %
BILIRUB SERPL-MCNC: 0.8 MG/DL (ref 0.3–1.2)
BILIRUB UR QL STRIP.AUTO: NEGATIVE
BUN SERPL-MCNC: 10 MG/DL (ref 7–22)
CALCIUM SERPL-MCNC: 9.6 MG/DL (ref 8.5–10.5)
CASTS #/AREA URNS LPF: ABNORMAL /LPF
CASTS 2: ABNORMAL /LPF
CHARACTER UR: CLEAR
CHLORIDE SERPL-SCNC: 95 MEQ/L (ref 98–111)
CO2 SERPL-SCNC: 28 MEQ/L (ref 23–33)
COLOR, UA: YELLOW
CREAT SERPL-MCNC: 0.7 MG/DL (ref 0.4–1.2)
CRYSTALS URNS MICRO: ABNORMAL
DEPRECATED RDW RBC AUTO: 42 FL (ref 35–45)
EOSINOPHIL NFR BLD AUTO: 0 %
EOSINOPHILS ABSOLUTE: 0 THOU/MM3 (ref 0–0.4)
EPITHELIAL CELLS, UA: ABNORMAL /HPF
ERYTHROCYTE [DISTWIDTH] IN BLOOD BY AUTOMATED COUNT: 15.3 % (ref 11.5–14.5)
GFR SERPL CREATININE-BSD FRML MDRD: > 90 ML/MIN/1.73M2
GLUCOSE SERPL-MCNC: 136 MG/DL (ref 70–108)
GLUCOSE UR QL STRIP.AUTO: NEGATIVE MG/DL
HCT VFR BLD AUTO: 45.6 % (ref 37–47)
HGB BLD-MCNC: 14.4 GM/DL (ref 12–16)
HGB UR QL STRIP.AUTO: NEGATIVE
IMM GRANULOCYTES # BLD AUTO: 0.04 THOU/MM3 (ref 0–0.07)
IMM GRANULOCYTES NFR BLD AUTO: 0.4 %
KETONES UR QL STRIP.AUTO: NEGATIVE
LIPASE SERPL-CCNC: 8.7 U/L (ref 5.6–51.3)
LYMPHOCYTES ABSOLUTE: 1.9 THOU/MM3 (ref 1–4.8)
LYMPHOCYTES NFR BLD AUTO: 19 %
MCH RBC QN AUTO: 24.5 PG (ref 26–33)
MCHC RBC AUTO-ENTMCNC: 31.6 GM/DL (ref 32.2–35.5)
MCV RBC AUTO: 77.6 FL (ref 81–99)
MISCELLANEOUS 2: ABNORMAL
MONOCYTES ABSOLUTE: 0.7 THOU/MM3 (ref 0.4–1.3)
MONOCYTES NFR BLD AUTO: 6.9 %
NEUTROPHILS ABSOLUTE: 7.4 THOU/MM3 (ref 1.8–7.7)
NEUTROPHILS NFR BLD AUTO: 73.4 %
NITRITE UR QL STRIP: NEGATIVE
NRBC BLD AUTO-RTO: 0 /100 WBC
OSMOLALITY SERPL CALC.SUM OF ELEC: 274.9 MOSMOL/KG (ref 275–300)
PH UR STRIP.AUTO: 8 [PH] (ref 5–9)
PLATELET # BLD AUTO: 431 THOU/MM3 (ref 130–400)
PMV BLD AUTO: 11.6 FL (ref 9.4–12.4)
POTASSIUM SERPL-SCNC: 3.9 MEQ/L (ref 3.5–5.2)
PROT SERPL-MCNC: 7.8 G/DL (ref 6.1–8)
PROT UR STRIP.AUTO-MCNC: ABNORMAL MG/DL
RBC # BLD AUTO: 5.88 MILL/MM3 (ref 4.2–5.4)
RBC URINE: ABNORMAL /HPF
RENAL EPI CELLS #/AREA URNS HPF: ABNORMAL /[HPF]
SODIUM SERPL-SCNC: 137 MEQ/L (ref 135–145)
SP GR UR REFRACT.AUTO: 1.01 (ref 1–1.03)
UROBILINOGEN, URINE: 2 EU/DL (ref 0–1)
WBC # BLD AUTO: 10.1 THOU/MM3 (ref 4.8–10.8)
WBC #/AREA URNS HPF: ABNORMAL /HPF
WBC #/AREA URNS HPF: ABNORMAL /[HPF]
YEAST LIKE FUNGI URNS QL MICRO: ABNORMAL

## 2024-12-28 PROCEDURE — 81001 URINALYSIS AUTO W/SCOPE: CPT

## 2024-12-28 PROCEDURE — 83690 ASSAY OF LIPASE: CPT

## 2024-12-28 PROCEDURE — 36415 COLL VENOUS BLD VENIPUNCTURE: CPT

## 2024-12-28 PROCEDURE — 87086 URINE CULTURE/COLONY COUNT: CPT

## 2024-12-28 PROCEDURE — 74177 CT ABD & PELVIS W/CONTRAST: CPT

## 2024-12-28 PROCEDURE — 96365 THER/PROPH/DIAG IV INF INIT: CPT

## 2024-12-28 PROCEDURE — 6360000004 HC RX CONTRAST MEDICATION: Performed by: STUDENT IN AN ORGANIZED HEALTH CARE EDUCATION/TRAINING PROGRAM

## 2024-12-28 PROCEDURE — 96375 TX/PRO/DX INJ NEW DRUG ADDON: CPT

## 2024-12-28 PROCEDURE — 99285 EMERGENCY DEPT VISIT HI MDM: CPT

## 2024-12-28 PROCEDURE — 6360000002 HC RX W HCPCS: Performed by: STUDENT IN AN ORGANIZED HEALTH CARE EDUCATION/TRAINING PROGRAM

## 2024-12-28 PROCEDURE — 2580000003 HC RX 258: Performed by: STUDENT IN AN ORGANIZED HEALTH CARE EDUCATION/TRAINING PROGRAM

## 2024-12-28 PROCEDURE — 80053 COMPREHEN METABOLIC PANEL: CPT

## 2024-12-28 PROCEDURE — 85025 COMPLETE CBC W/AUTO DIFF WBC: CPT

## 2024-12-28 RX ORDER — MORPHINE SULFATE 2 MG/ML
2 INJECTION, SOLUTION INTRAMUSCULAR; INTRAVENOUS ONCE
Status: COMPLETED | OUTPATIENT
Start: 2024-12-28 | End: 2024-12-28

## 2024-12-28 RX ORDER — IOPAMIDOL 755 MG/ML
80 INJECTION, SOLUTION INTRAVASCULAR
Status: COMPLETED | OUTPATIENT
Start: 2024-12-28 | End: 2024-12-28

## 2024-12-28 RX ADMIN — MORPHINE SULFATE 2 MG: 2 INJECTION, SOLUTION INTRAMUSCULAR; INTRAVENOUS at 12:17

## 2024-12-28 RX ADMIN — IOPAMIDOL 80 ML: 755 INJECTION, SOLUTION INTRAVENOUS at 12:30

## 2024-12-28 RX ADMIN — PIPERACILLIN AND TAZOBACTAM 4500 MG: 4; .5 INJECTION, POWDER, FOR SOLUTION INTRAVENOUS at 14:21

## 2024-12-28 ASSESSMENT — PAIN - FUNCTIONAL ASSESSMENT
PAIN_FUNCTIONAL_ASSESSMENT: 0-10
PAIN_FUNCTIONAL_ASSESSMENT: NONE - DENIES PAIN

## 2024-12-28 ASSESSMENT — PAIN DESCRIPTION - PAIN TYPE: TYPE: ACUTE PAIN

## 2024-12-28 ASSESSMENT — PAIN DESCRIPTION - LOCATION: LOCATION: ABDOMEN;FLANK

## 2024-12-28 ASSESSMENT — PAIN DESCRIPTION - ORIENTATION: ORIENTATION: LEFT

## 2024-12-28 ASSESSMENT — PAIN SCALES - GENERAL: PAINLEVEL_OUTOF10: 7

## 2024-12-28 ASSESSMENT — PAIN DESCRIPTION - DESCRIPTORS: DESCRIPTORS: NAGGING

## 2024-12-28 NOTE — ED NOTES
Patient presents to ED with complaint of LLQ pain radiates into left flank.  Patient states pain started last week and she went to her family doctor.  Patient states there are liver byproducts noted in her urine and she was told to come for further evaluation right away.  Patient denies known UTI.  Patient further complains of nausea and appears short of breath.  Patient refusing to sit on cart, stating she is more comfortable in chair at this time.  Patient assisted to restroom via wheelchair to collect urine.

## 2024-12-28 NOTE — ED PROVIDER NOTES
[facility] EMERGENCY DEPT    EMERGENCY MEDICINE      Pt Name: Baylee Irizarry  MRN: [id number]  Birthdate 1955  Date of evaluation: 2024  Treating Physician: Vinicius Shannon DO    CHIEF COMPLAINT       Chief Complaint   Patient presents with    Abdominal Pain     Left lower radiates into back left flank      Flank Pain     left     History obtained from chart review and the patient.    HISTORY OF PRESENT ILLNESS    HPI    Baylee Irizarry is a 69 y.o. female presents to the emergency department for evaluation of left lower quadrant abdominal pain/flank pain.  Patient ports the discomfort as 6 or 7 out of 10.  Feels has been ongoing over the last week.  Was seen by her family doctor/PCP today and because of the bilirubin in your urine sent for further evaluation in the emergency department.  Denies any known UTI however has been having some associated frequency.  Complex medical history recently with complicated diverticulitis.  Associated nausea.  Declines any chest pain, shortness of breath, numbness, tingling or weakness.  No changes in her stools.  Reports no fevers, chills.    The patient has no other acute complaints at this time.      PAST MEDICAL AND SURGICAL HISTORY     Past Medical History:   Diagnosis Date    Anemia     Anxiety     Asthma     CHF (congestive heart failure) (HCC)     COPD (chronic obstructive pulmonary disease) (HCC)     CRP elevated     Depression     DM type 2 (diabetes mellitus, type 2) (HCC)     Headache(784.0)     HTN (hypertension)     Lupus     Obesity     RIRI (obstructive sleep apnea)     Osteoarthritis     Positive KODAK (antinuclear antibody)        Past Surgical History:   Procedure Laterality Date     SECTION      FOOT SURGERY      HYSTERECTOMY (CERVIX STATUS UNKNOWN)      LAPAROSCOPY N/A 10/3/2024    ROBOTIC DRAINAGE PELVIC ABSCESS performed by Shun Mejia MD at [facility] OR    UPPER GASTROINTESTINAL ENDOSCOPY N/A 2024    EGD performed by

## 2024-12-28 NOTE — PROGRESS NOTES
Pharmacy Note - Extended Infusion Beta-Lactam Dose Adjustment    Piperacillin/Tazobactam 3375 mg x 1 intermittent infusion ordered for the treatment of Intra-abdominal Infection. Per [facility] Extended Infusion Beta-Lactam Policy, this will be changed to 4500 mg loading dose x 1    Estimated Creatinine Clearance: Estimated Creatinine Clearance: 95 mL/min (based on SCr of 0.7 mg/dL).    Dialysis Status, DELICIA, CKD: Normal    BMI: Body mass index is 47.55 kg/m².    Rationale for Adjustment: Dose adjusted per [facility] Extended Infusion Policy based on renal function and indication. The above medication is renally eliminated and demonstrates time-dependent effects on bacterial eradication. Extended-infusion dosing strategy aims to enhance microbiologic and clinical efficacy.     Pharmacy will monitor renal function daily and adjust dose as necessary.      Please call with any questions.    Thank you,    Karina Amaya, PharmD  12/28/2024 1:45 PM

## 2024-12-28 NOTE — DISCHARGE INSTRUCTIONS
As we discussed is important that you take your antibiotics and follow-up very closely with your surgeon.  We do not have an answer for what is causing this fluid collection but it is large enough at this point that it may be contributing to your pain.  If any point you develop bloody urine, bloody bowel movements, worsening pain, chest pain, shortness of breath or any other concerns do not hesitate to return to the emergency department for reevaluation.

## 2024-12-29 LAB
BACTERIA UR CULT: ABNORMAL
ORGANISM: ABNORMAL

## 2025-01-06 ENCOUNTER — OFFICE VISIT (OUTPATIENT)
Dept: SURGERY | Age: 70
End: 2025-01-06
Payer: MEDICARE

## 2025-01-06 VITALS
TEMPERATURE: 97.3 F | RESPIRATION RATE: 18 BRPM | HEART RATE: 88 BPM | SYSTOLIC BLOOD PRESSURE: 134 MMHG | DIASTOLIC BLOOD PRESSURE: 84 MMHG | WEIGHT: 260 LBS | HEIGHT: 62 IN | OXYGEN SATURATION: 98 % | BODY MASS INDEX: 47.84 KG/M2

## 2025-01-06 DIAGNOSIS — E11.8 CONTROLLED TYPE 2 DIABETES MELLITUS WITH COMPLICATION, WITHOUT LONG-TERM CURRENT USE OF INSULIN (HCC): ICD-10-CM

## 2025-01-06 DIAGNOSIS — R76.8 POSITIVE ANA (ANTINUCLEAR ANTIBODY): ICD-10-CM

## 2025-01-06 DIAGNOSIS — E05.90 HYPERTHYROIDISM: ICD-10-CM

## 2025-01-06 DIAGNOSIS — E66.01 MORBID OBESITY: ICD-10-CM

## 2025-01-06 DIAGNOSIS — G47.33 OSA (OBSTRUCTIVE SLEEP APNEA): ICD-10-CM

## 2025-01-06 DIAGNOSIS — I10 PRIMARY HYPERTENSION: Primary | ICD-10-CM

## 2025-01-06 PROCEDURE — 1124F ACP DISCUSS-NO DSCNMKR DOCD: CPT | Performed by: SURGERY

## 2025-01-06 PROCEDURE — 1159F MED LIST DOCD IN RCRD: CPT | Performed by: SURGERY

## 2025-01-06 PROCEDURE — 3079F DIAST BP 80-89 MM HG: CPT | Performed by: SURGERY

## 2025-01-06 PROCEDURE — 1125F AMNT PAIN NOTED PAIN PRSNT: CPT | Performed by: SURGERY

## 2025-01-06 PROCEDURE — 99214 OFFICE O/P EST MOD 30 MIN: CPT | Performed by: SURGERY

## 2025-01-06 PROCEDURE — 3075F SYST BP GE 130 - 139MM HG: CPT | Performed by: SURGERY

## 2025-01-06 ASSESSMENT — ENCOUNTER SYMPTOMS
DIARRHEA: 0
BACK PAIN: 1
ABDOMINAL DISTENTION: 0
EYE DISCHARGE: 1
SORE THROAT: 0
CHEST TIGHTNESS: 0
RHINORRHEA: 1
VOMITING: 0
SHORTNESS OF BREATH: 0
BLOOD IN STOOL: 0
CONSTIPATION: 0
COLOR CHANGE: 0

## 2025-01-06 NOTE — PROGRESS NOTES
sensory deficits. CN II-XII are grossly intact..   EXTREMITIES: no cyanosis, no clubbing, and no edema                     Electronically signed by Shun Mejia MD MD FACS on 1/6/2025 at 1:58 PM

## 2025-02-10 ENCOUNTER — CLINICAL DOCUMENTATION (OUTPATIENT)
Age: 70
End: 2025-02-10

## 2025-02-10 ENCOUNTER — TELEPHONE (OUTPATIENT)
Age: 70
End: 2025-02-10

## 2025-02-10 DIAGNOSIS — E05.90 HYPERTHYROIDISM: Primary | ICD-10-CM

## 2025-02-10 NOTE — PROGRESS NOTES
Repeat thyroid labs as was ordered on 11/22/2024.  I do not see that it has been done.  I have reordered it.  Keep existing appointment.

## 2025-02-10 NOTE — TELEPHONE ENCOUNTER
Patient called in stating she is losing a lot of hair she states it is \"falling out by handfulls\" and would like to stop taking methlmazole. Please advise.

## 2025-02-14 ENCOUNTER — LAB (OUTPATIENT)
Dept: LAB | Age: 70
End: 2025-02-14

## 2025-02-14 DIAGNOSIS — E05.90 HYPERTHYROIDISM: ICD-10-CM

## 2025-02-14 LAB
T3FREE SERPL-MCNC: 3.69 PG/ML (ref 2–4.4)
T4 FREE SERPL-MCNC: 0.67 NG/DL (ref 0.92–1.68)
TSH SERPL DL<=0.005 MIU/L-ACNC: 2.49 UIU/ML (ref 0.4–4.2)

## 2025-02-15 ENCOUNTER — CLINICAL DOCUMENTATION (OUTPATIENT)
Age: 70
End: 2025-02-15

## 2025-02-15 DIAGNOSIS — E05.90 HYPERTHYROIDISM: Primary | ICD-10-CM

## 2025-02-15 NOTE — PROGRESS NOTES
Labs discussed with patient.  Change Tapazole from 20 mg daily to 15 mg daily and get free T4, free T3 and TSH in 2 weeks to ensure stability.  Keep existing appointment for next month.  Labs have been ordered.

## 2025-02-15 NOTE — RESULT ENCOUNTER NOTE
Baylee Irizarry  lab test(s) were abnormal.  Labs discussed with patient.  Change Tapazole from 20 mg daily to 15 mg daily and get free T4, free T3 and TSH in 2 weeks to ensure stability.  Keep existing appointment for next month.  Labs have been ordered.  Please contact patient and document response.

## 2025-02-18 ENCOUNTER — TELEPHONE (OUTPATIENT)
Age: 70
End: 2025-02-18

## 2025-02-18 NOTE — TELEPHONE ENCOUNTER
----- Message from Dr. Louie Webster MD sent at 2/15/2025 12:16 PM EST -----  Baylee Irizarry  lab test(s) were abnormal.  Labs discussed with patient.  Change Tapazole from 20 mg daily to 15 mg daily and get free T4, free T3 and TSH in 2 weeks to ensure stability.  Keep existing appointment for next month.  Labs have been ordered.  Please contact patient and document response.

## 2025-02-23 DIAGNOSIS — E05.90 HYPERTHYROIDISM: ICD-10-CM

## 2025-02-25 RX ORDER — METHIMAZOLE 10 MG/1
TABLET ORAL
Qty: 60 TABLET | Refills: 2 | Status: SHIPPED | OUTPATIENT
Start: 2025-02-25

## 2025-03-03 ENCOUNTER — TELEPHONE (OUTPATIENT)
Age: 70
End: 2025-03-03

## 2025-03-03 NOTE — TELEPHONE ENCOUNTER
Patient called in regarding Biotin and getting thyroid labs done. Spoke with  he's says patient is to stop biotin one week beofre thyroid labs. Patient has been advised and verbalized understanding.

## 2025-03-19 ENCOUNTER — LAB (OUTPATIENT)
Dept: LAB | Age: 70
End: 2025-03-19

## 2025-03-19 DIAGNOSIS — E05.90 HYPERTHYROIDISM: ICD-10-CM

## 2025-03-19 LAB
T3FREE SERPL-MCNC: 3.45 PG/ML (ref 2–4.4)
T4 FREE SERPL-MCNC: 0.7 NG/DL (ref 0.92–1.68)
TSH SERPL DL<=0.05 MIU/L-ACNC: 6.62 UIU/ML (ref 0.27–4.2)

## 2025-03-21 ENCOUNTER — OFFICE VISIT (OUTPATIENT)
Age: 70
End: 2025-03-21
Payer: MEDICARE

## 2025-03-21 VITALS
BODY MASS INDEX: 52.23 KG/M2 | WEIGHT: 283.8 LBS | DIASTOLIC BLOOD PRESSURE: 72 MMHG | HEIGHT: 62 IN | HEART RATE: 75 BPM | RESPIRATION RATE: 18 BRPM | SYSTOLIC BLOOD PRESSURE: 130 MMHG

## 2025-03-21 DIAGNOSIS — E04.2 MULTINODULAR GOITER: Primary | ICD-10-CM

## 2025-03-21 DIAGNOSIS — E05.90 HYPERTHYROIDISM: ICD-10-CM

## 2025-03-21 PROCEDURE — 1159F MED LIST DOCD IN RCRD: CPT

## 2025-03-21 PROCEDURE — 3075F SYST BP GE 130 - 139MM HG: CPT

## 2025-03-21 PROCEDURE — 1124F ACP DISCUSS-NO DSCNMKR DOCD: CPT

## 2025-03-21 PROCEDURE — 3078F DIAST BP <80 MM HG: CPT

## 2025-03-21 PROCEDURE — 99214 OFFICE O/P EST MOD 30 MIN: CPT

## 2025-03-21 RX ORDER — METHIMAZOLE 5 MG/1
TABLET ORAL
Qty: 30 TABLET | Refills: 1 | Status: SHIPPED | OUTPATIENT
Start: 2025-03-21

## 2025-03-21 NOTE — PROGRESS NOTES
Hyperthyroidism Checklist   Name: aBylee Batesmariama    YOB: 1955    Hyperthyroidism Yes No   Weight Loss  []  [x]    Anxiety  []  [x]    Tremors  []  [x]    Palpitation/Rapid Heart Beat  []  [x]    Loose/Frequent Bowel Movements  []  [x]    Fatigue/Low Energy  []  [x]    Insomnia  []  [x]    Skin Rash  []  [x]    Eye Pain/Dryness  [x]  []    Heat Intolerance  []  [x]       
Dafne Reeves, APRN - CNP 3/21/2025 9:31 AM     **This report has been created using voice recognition software. It may   contain minor errors which are inherent in voice recognition technology.**

## 2025-03-21 NOTE — PATIENT INSTRUCTIONS
I have changed your methimazole dose to 5 mg daily, I would like you to repeat your labs in two weeks after starting your new dose and get an ultrasound in the next month to follow up on your nodules.

## 2025-04-04 ENCOUNTER — LAB (OUTPATIENT)
Dept: LAB | Age: 70
End: 2025-04-04

## 2025-04-04 DIAGNOSIS — E05.90 HYPERTHYROIDISM: ICD-10-CM

## 2025-04-04 LAB
ALBUMIN SERPL BCG-MCNC: 3.5 G/DL (ref 3.4–4.9)
ALP SERPL-CCNC: 139 U/L (ref 38–126)
ALT SERPL W/O P-5'-P-CCNC: 17 U/L (ref 10–35)
ANION GAP SERPL CALC-SCNC: 9 MEQ/L (ref 8–16)
AST SERPL-CCNC: 23 U/L (ref 10–35)
BASOPHILS ABSOLUTE: 0 THOU/MM3 (ref 0–0.1)
BASOPHILS NFR BLD AUTO: 0.4 %
BILIRUB SERPL-MCNC: 0.4 MG/DL (ref 0.3–1.2)
BUN SERPL-MCNC: 19 MG/DL (ref 8–23)
CALCIUM SERPL-MCNC: 9.2 MG/DL (ref 8.8–10.2)
CHLORIDE SERPL-SCNC: 104 MEQ/L (ref 98–111)
CO2 SERPL-SCNC: 27 MEQ/L (ref 22–29)
CREAT SERPL-MCNC: 0.6 MG/DL (ref 0.5–0.9)
DEPRECATED RDW RBC AUTO: 43.9 FL (ref 35–45)
EOSINOPHIL NFR BLD AUTO: 0 %
EOSINOPHILS ABSOLUTE: 0 THOU/MM3 (ref 0–0.4)
ERYTHROCYTE [DISTWIDTH] IN BLOOD BY AUTOMATED COUNT: 14.9 % (ref 11.5–14.5)
GFR SERPL CREATININE-BSD FRML MDRD: > 90 ML/MIN/1.73M2
GLUCOSE SERPL-MCNC: 118 MG/DL (ref 74–109)
HCT VFR BLD AUTO: 42.3 % (ref 37–47)
HGB BLD-MCNC: 12.9 GM/DL (ref 12–16)
IMM GRANULOCYTES # BLD AUTO: 0.02 THOU/MM3 (ref 0–0.07)
IMM GRANULOCYTES NFR BLD AUTO: 0.3 %
LYMPHOCYTES ABSOLUTE: 1.8 THOU/MM3 (ref 1–4.8)
LYMPHOCYTES NFR BLD AUTO: 26.9 %
MCH RBC QN AUTO: 24.9 PG (ref 26–33)
MCHC RBC AUTO-ENTMCNC: 30.5 GM/DL (ref 32.2–35.5)
MCV RBC AUTO: 81.7 FL (ref 81–99)
MONOCYTES ABSOLUTE: 0.5 THOU/MM3 (ref 0.4–1.3)
MONOCYTES NFR BLD AUTO: 7.1 %
NEUTROPHILS ABSOLUTE: 4.4 THOU/MM3 (ref 1.8–7.7)
NEUTROPHILS NFR BLD AUTO: 65.3 %
NRBC BLD AUTO-RTO: 0 /100 WBC
PLATELET # BLD AUTO: 227 THOU/MM3 (ref 130–400)
PMV BLD AUTO: 12.2 FL (ref 9.4–12.4)
POTASSIUM SERPL-SCNC: 4.3 MEQ/L (ref 3.5–5.2)
PROT SERPL-MCNC: 6.7 G/DL (ref 6.4–8.3)
RBC # BLD AUTO: 5.18 MILL/MM3 (ref 4.2–5.4)
SODIUM SERPL-SCNC: 140 MEQ/L (ref 135–145)
T3FREE SERPL-MCNC: 3.77 PG/ML (ref 2–4.4)
T4 FREE SERPL-MCNC: 1 NG/DL (ref 0.92–1.68)
TSH SERPL DL<=0.05 MIU/L-ACNC: 0.34 UIU/ML (ref 0.27–4.2)
WBC # BLD AUTO: 6.7 THOU/MM3 (ref 4.8–10.8)

## 2025-04-07 ENCOUNTER — RESULTS FOLLOW-UP (OUTPATIENT)
Age: 70
End: 2025-04-07

## 2025-04-07 DIAGNOSIS — E05.90 HYPERTHYROIDISM: ICD-10-CM

## 2025-04-07 RX ORDER — METHIMAZOLE 5 MG/1
2.5 TABLET ORAL DAILY
Qty: 30 TABLET | Refills: 1 | Status: SHIPPED | OUTPATIENT
Start: 2025-04-07 | End: 2025-04-07

## 2025-04-07 RX ORDER — METHIMAZOLE 5 MG/1
2.5 TABLET ORAL DAILY
Qty: 30 TABLET | Refills: 1 | Status: SHIPPED | OUTPATIENT
Start: 2025-04-07

## 2025-04-07 NOTE — TELEPHONE ENCOUNTER
----- Message from KALIN Downing CNP sent at 4/7/2025  8:41 AM EDT -----  Please contact patient and document response. Your labs are stable on the reduced dose of methimazole. I would like to decrease your dose further by having you take a half tablet daily. After you have been on this adjusted dose for 2 weeks I would like you to get your labs rechecked. The remainder of your labs were otherwise normal. I have not received your thyroid ultrasound results yet but will follow up once I do. Please let me know if there are any questions.

## 2025-04-07 NOTE — RESULT ENCOUNTER NOTE
Please contact patient and document response. Your labs are stable on the reduced dose of methimazole. I would like to decrease your dose further by having you take a half tablet daily. After you have been on this adjusted dose for 2 weeks I would like you to get your labs rechecked. The remainder of your labs were otherwise normal. I have not received your thyroid ultrasound results yet but will follow up once I do. Please let me know if there are any questions.

## 2025-04-07 NOTE — TELEPHONE ENCOUNTER
Pt informed and verbalized understanding with no further questions at this time. Will complete at [place].

## 2025-04-19 ENCOUNTER — LAB (OUTPATIENT)
Dept: LAB | Age: 70
End: 2025-04-19

## 2025-04-19 DIAGNOSIS — E05.90 HYPERTHYROIDISM: ICD-10-CM

## 2025-04-19 LAB
T3FREE SERPL-MCNC: 4.33 PG/ML (ref 2–4.4)
T4 FREE SERPL-MCNC: 1.2 NG/DL (ref 0.92–1.68)
TSH SERPL DL<=0.05 MIU/L-ACNC: 0.07 UIU/ML (ref 0.27–4.2)

## 2025-04-21 ENCOUNTER — RESULTS FOLLOW-UP (OUTPATIENT)
Age: 70
End: 2025-04-21

## 2025-04-21 DIAGNOSIS — E05.00 GRAVES DISEASE: Primary | ICD-10-CM

## 2025-04-21 DIAGNOSIS — E05.90 HYPERTHYROIDISM: ICD-10-CM

## 2025-04-21 RX ORDER — METHIMAZOLE 5 MG/1
5 TABLET ORAL DAILY
Qty: 30 TABLET | Refills: 1 | Status: SHIPPED | OUTPATIENT
Start: 2025-04-21

## 2025-04-21 NOTE — RESULT ENCOUNTER NOTE
Please contact patient and document response, your most recent labs after reducing your methimazole dose show questionable tolerance of the reduced dose, I would like you to change to dose of 5 mg daily and get repeat labs in one month to assess. Please let me know if the patient has any questions, I have ordered repeat labs as well as the adjusted dose of methimazole to patients pharmacy

## 2025-04-21 NOTE — TELEPHONE ENCOUNTER
----- Message from KALIN Downing CNP sent at 4/21/2025  8:51 AM EDT -----  Please contact patient and document response, your most recent labs after reducing your methimazole dose show questionable tolerance of the reduced dose, I would like you to change to dose of 5 mg daily and get repeat labs in one month to assess. Please let me know if the patient has any questions, I have ordered repeat labs as well as the adjusted dose of methimazole to patients pharmacy

## 2025-04-21 NOTE — TELEPHONE ENCOUNTER
Left message on answering machine requesting pt to call back at earliest convenience.        ----- Message from KALIN Downing CNP sent at 4/21/2025  8:51 AM EDT -----  Please contact patient and document response, your most recent labs after reducing your methimazole dose show questionable tolerance of the reduced dose, I would like you to change to dose of 5 mg daily and get repeat labs in one month to assess. Please let me know if the patient has any questions, I have ordered repeat labs as well as the adjusted dose of methimazole to patients pharmacy

## 2025-04-25 ENCOUNTER — HOSPITAL ENCOUNTER (OUTPATIENT)
Dept: ULTRASOUND IMAGING | Age: 70
Discharge: HOME | End: 2025-04-25
Payer: MEDICARE

## 2025-04-25 DIAGNOSIS — E04.2 MULTINODULAR GOITER: ICD-10-CM

## 2025-04-25 PROCEDURE — 76536 US EXAM OF HEAD AND NECK: CPT

## 2025-04-28 ENCOUNTER — RESULTS FOLLOW-UP (OUTPATIENT)
Age: 70
End: 2025-04-28

## 2025-04-28 DIAGNOSIS — E04.2 MULTINODULAR GOITER: Primary | ICD-10-CM

## 2025-04-28 NOTE — RESULT ENCOUNTER NOTE
Please contact patient and document response . your most recent thyroid ultrasound showed 2  nodules on the right lobe which you will need to get biopsied. Please let me know if the patient has any questions

## 2025-04-29 NOTE — TELEPHONE ENCOUNTER
----- Message from KALIN Downing CNP sent at 4/28/2025 12:51 PM EDT -----  Please contact patient and document response . your most recent thyroid ultrasound showed 2  nodules on the right lobe which you will need to get biopsied. Please let me know if the patient has any questions

## 2025-04-29 NOTE — TELEPHONE ENCOUNTER
Spoke with patient. Patient had biopsy on both nodules in 2022 and would prefer not to have another and go through the pain again.  I pulled the biopsy from [facility]. It should be In system now.     ----- Message from KALIN Downing CNP sent at 4/28/2025 12:51 PM EDT -----  Please contact patient and document response . your most recent thyroid ultrasound showed 2  nodules on the right lobe which you will need to get biopsied. Please let me know if the patient has any questions

## 2025-05-16 ENCOUNTER — APPOINTMENT (OUTPATIENT)
Dept: CT IMAGING | Age: 70
End: 2025-05-16
Payer: MEDICARE

## 2025-05-16 ENCOUNTER — HOSPITAL ENCOUNTER (EMERGENCY)
Age: 70
Discharge: HOME | End: 2025-05-16
Attending: STUDENT IN AN ORGANIZED HEALTH CARE EDUCATION/TRAINING PROGRAM
Payer: MEDICARE

## 2025-05-16 VITALS
OXYGEN SATURATION: 98 % | SYSTOLIC BLOOD PRESSURE: 183 MMHG | DIASTOLIC BLOOD PRESSURE: 69 MMHG | BODY MASS INDEX: 49.08 KG/M2 | TEMPERATURE: 98.1 F | WEIGHT: 277 LBS | HEART RATE: 80 BPM | RESPIRATION RATE: 18 BRPM | HEIGHT: 63 IN

## 2025-05-16 DIAGNOSIS — N12 PYELONEPHRITIS: ICD-10-CM

## 2025-05-16 DIAGNOSIS — J18.9 PNEUMONIA OF BOTH LUNGS DUE TO INFECTIOUS ORGANISM, UNSPECIFIED PART OF LUNG: ICD-10-CM

## 2025-05-16 DIAGNOSIS — K57.32 DIVERTICULITIS OF COLON: ICD-10-CM

## 2025-05-16 LAB
ALBUMIN SERPL BCG-MCNC: 3.1 G/DL (ref 3.4–4.9)
ALP SERPL-CCNC: 154 U/L (ref 38–126)
ALT SERPL W/O P-5'-P-CCNC: 18 U/L (ref 10–35)
ANION GAP SERPL CALC-SCNC: 11 MEQ/L (ref 8–16)
AST SERPL-CCNC: 21 U/L (ref 10–35)
BACTERIA URNS QL MICRO: ABNORMAL /HPF
BASOPHILS ABSOLUTE: 0 THOU/MM3 (ref 0–0.1)
BASOPHILS NFR BLD AUTO: 0.2 %
BILIRUB SERPL-MCNC: 0.8 MG/DL (ref 0.3–1.2)
BILIRUB UR QL STRIP.AUTO: NEGATIVE
BUN SERPL-MCNC: 10 MG/DL (ref 8–23)
CALCIUM SERPL-MCNC: 9.3 MG/DL (ref 8.8–10.2)
CASTS #/AREA URNS LPF: ABNORMAL /LPF
CASTS 2: ABNORMAL /LPF
CHARACTER UR: CLEAR
CHLORIDE SERPL-SCNC: 102 MEQ/L (ref 98–111)
CO2 SERPL-SCNC: 26 MEQ/L (ref 22–29)
COLOR, UA: YELLOW
CREAT SERPL-MCNC: 0.6 MG/DL (ref 0.5–0.9)
CRYSTALS URNS MICRO: ABNORMAL
DEPRECATED RDW RBC AUTO: 38.5 FL (ref 35–45)
EKG ATRIAL RATE: 83 BPM
EKG ATRIAL RATE: 88 BPM
EKG P AXIS: 38 DEGREES
EKG P AXIS: 66 DEGREES
EKG P-R INTERVAL: 122 MS
EKG P-R INTERVAL: 148 MS
EKG Q-T INTERVAL: 390 MS
EKG Q-T INTERVAL: 394 MS
EKG QRS DURATION: 90 MS
EKG QRS DURATION: 94 MS
EKG QTC CALCULATION (BAZETT): 458 MS
EKG QTC CALCULATION (BAZETT): 476 MS
EKG R AXIS: 15 DEGREES
EKG R AXIS: 47 DEGREES
EKG T AXIS: 51 DEGREES
EKG T AXIS: 53 DEGREES
EKG VENTRICULAR RATE: 83 BPM
EKG VENTRICULAR RATE: 88 BPM
EOSINOPHIL NFR BLD AUTO: 0 %
EOSINOPHILS ABSOLUTE: 0 THOU/MM3 (ref 0–0.4)
EPITHELIAL CELLS, UA: ABNORMAL /HPF
ERYTHROCYTE [DISTWIDTH] IN BLOOD BY AUTOMATED COUNT: 13.3 % (ref 11.5–14.5)
GFR SERPL CREATININE-BSD FRML MDRD: > 90 ML/MIN/1.73M2
GLUCOSE SERPL-MCNC: 138 MG/DL (ref 74–109)
GLUCOSE UR QL STRIP.AUTO: NEGATIVE MG/DL
HCT VFR BLD AUTO: 41.1 % (ref 37–47)
HGB BLD-MCNC: 12.7 GM/DL (ref 12–16)
HGB UR QL STRIP.AUTO: ABNORMAL
IMM GRANULOCYTES # BLD AUTO: 0.07 THOU/MM3 (ref 0–0.07)
IMM GRANULOCYTES NFR BLD AUTO: 0.6 %
KETONES UR QL STRIP.AUTO: NEGATIVE
LIPASE SERPL-CCNC: 13 U/L (ref 13–60)
LYMPHOCYTES ABSOLUTE: 1.4 THOU/MM3 (ref 1–4.8)
LYMPHOCYTES NFR BLD AUTO: 11.4 %
MAGNESIUM SERPL-MCNC: 2 MG/DL (ref 1.6–2.6)
MCH RBC QN AUTO: 24.6 PG (ref 26–33)
MCHC RBC AUTO-ENTMCNC: 30.9 GM/DL (ref 32.2–35.5)
MCV RBC AUTO: 79.5 FL (ref 81–99)
MISCELLANEOUS 2: ABNORMAL
MONOCYTES ABSOLUTE: 0.9 THOU/MM3 (ref 0.4–1.3)
MONOCYTES NFR BLD AUTO: 7.2 %
NEUTROPHILS ABSOLUTE: 9.6 THOU/MM3 (ref 1.8–7.7)
NEUTROPHILS NFR BLD AUTO: 80.6 %
NITRITE UR QL STRIP: NEGATIVE
NRBC BLD AUTO-RTO: 0 /100 WBC
NT-PROBNP SERPL IA-MCNC: 1808 PG/ML (ref 0–124)
OSMOLALITY SERPL CALC.SUM OF ELEC: 278.8 MOSMOL/KG (ref 275–300)
PH UR STRIP.AUTO: 6.5 [PH] (ref 5–9)
PLATELET # BLD AUTO: 479 THOU/MM3 (ref 130–400)
PMV BLD AUTO: 11 FL (ref 9.4–12.4)
POTASSIUM SERPL-SCNC: 4.2 MEQ/L (ref 3.5–5.2)
PROT SERPL-MCNC: 7.3 G/DL (ref 6.4–8.3)
PROT UR STRIP.AUTO-MCNC: NEGATIVE MG/DL
RBC # BLD AUTO: 5.17 MILL/MM3 (ref 4.2–5.4)
RBC URINE: ABNORMAL /HPF
RENAL EPI CELLS #/AREA URNS HPF: ABNORMAL /[HPF]
SODIUM SERPL-SCNC: 139 MEQ/L (ref 135–145)
SP GR UR REFRACT.AUTO: 1.01 (ref 1–1.03)
T4 FREE SERPL-MCNC: 1.7 NG/DL (ref 0.92–1.68)
TROPONIN, HIGH SENSITIVITY: 8 NG/L (ref 0–12)
TSH SERPL DL<=0.05 MIU/L-ACNC: 0.01 UIU/ML (ref 0.27–4.2)
UROBILINOGEN, URINE: 1 EU/DL (ref 0–1)
WBC # BLD AUTO: 11.9 THOU/MM3 (ref 4.8–10.8)
WBC #/AREA URNS HPF: > 100 /HPF
WBC #/AREA URNS HPF: ABNORMAL /[HPF]
YEAST LIKE FUNGI URNS QL MICRO: ABNORMAL

## 2025-05-16 PROCEDURE — 36415 COLL VENOUS BLD VENIPUNCTURE: CPT

## 2025-05-16 PROCEDURE — 85025 COMPLETE CBC W/AUTO DIFF WBC: CPT

## 2025-05-16 PROCEDURE — 93005 ELECTROCARDIOGRAM TRACING: CPT | Performed by: EMERGENCY MEDICINE

## 2025-05-16 PROCEDURE — 87186 SC STD MICRODIL/AGAR DIL: CPT

## 2025-05-16 PROCEDURE — 87086 URINE CULTURE/COLONY COUNT: CPT

## 2025-05-16 PROCEDURE — 2580000003 HC RX 258: Performed by: EMERGENCY MEDICINE

## 2025-05-16 PROCEDURE — 84439 ASSAY OF FREE THYROXINE: CPT

## 2025-05-16 PROCEDURE — 87077 CULTURE AEROBIC IDENTIFY: CPT

## 2025-05-16 PROCEDURE — 93005 ELECTROCARDIOGRAM TRACING: CPT | Performed by: STUDENT IN AN ORGANIZED HEALTH CARE EDUCATION/TRAINING PROGRAM

## 2025-05-16 PROCEDURE — 81001 URINALYSIS AUTO W/SCOPE: CPT

## 2025-05-16 PROCEDURE — 96375 TX/PRO/DX INJ NEW DRUG ADDON: CPT

## 2025-05-16 PROCEDURE — 96365 THER/PROPH/DIAG IV INF INIT: CPT

## 2025-05-16 PROCEDURE — 93010 ELECTROCARDIOGRAM REPORT: CPT | Performed by: INTERNAL MEDICINE

## 2025-05-16 PROCEDURE — 80053 COMPREHEN METABOLIC PANEL: CPT

## 2025-05-16 PROCEDURE — 74177 CT ABD & PELVIS W/CONTRAST: CPT

## 2025-05-16 PROCEDURE — 6360000002 HC RX W HCPCS: Performed by: EMERGENCY MEDICINE

## 2025-05-16 PROCEDURE — 83690 ASSAY OF LIPASE: CPT

## 2025-05-16 PROCEDURE — 83880 ASSAY OF NATRIURETIC PEPTIDE: CPT

## 2025-05-16 PROCEDURE — 6360000004 HC RX CONTRAST MEDICATION: Performed by: EMERGENCY MEDICINE

## 2025-05-16 PROCEDURE — 71275 CT ANGIOGRAPHY CHEST: CPT

## 2025-05-16 PROCEDURE — 99285 EMERGENCY DEPT VISIT HI MDM: CPT

## 2025-05-16 PROCEDURE — 83735 ASSAY OF MAGNESIUM: CPT

## 2025-05-16 PROCEDURE — 86480 TB TEST CELL IMMUN MEASURE: CPT

## 2025-05-16 PROCEDURE — 84443 ASSAY THYROID STIM HORMONE: CPT

## 2025-05-16 PROCEDURE — 87040 BLOOD CULTURE FOR BACTERIA: CPT

## 2025-05-16 PROCEDURE — 84484 ASSAY OF TROPONIN QUANT: CPT

## 2025-05-16 RX ORDER — ONDANSETRON 2 MG/ML
4 INJECTION INTRAMUSCULAR; INTRAVENOUS ONCE
Status: COMPLETED | OUTPATIENT
Start: 2025-05-16 | End: 2025-05-16

## 2025-05-16 RX ORDER — DOXYCYCLINE HYCLATE 100 MG
100 TABLET ORAL 2 TIMES DAILY
Qty: 14 TABLET | Refills: 0 | Status: SHIPPED | OUTPATIENT
Start: 2025-05-16 | End: 2025-05-16 | Stop reason: ALTCHOICE

## 2025-05-16 RX ORDER — IOPAMIDOL 755 MG/ML
80 INJECTION, SOLUTION INTRAVASCULAR
Status: COMPLETED | OUTPATIENT
Start: 2025-05-16 | End: 2025-05-16

## 2025-05-16 RX ORDER — FENTANYL CITRATE 50 UG/ML
50 INJECTION, SOLUTION INTRAMUSCULAR; INTRAVENOUS ONCE
Status: DISCONTINUED | OUTPATIENT
Start: 2025-05-16 | End: 2025-05-16 | Stop reason: HOSPADM

## 2025-05-16 RX ORDER — DOXYCYCLINE 25 MG/5ML
100 POWDER, FOR SUSPENSION ORAL 2 TIMES DAILY
Qty: 280 ML | Refills: 0 | Status: SHIPPED | OUTPATIENT
Start: 2025-05-16 | End: 2025-05-23

## 2025-05-16 RX ORDER — AMOXICILLIN AND CLAVULANATE POTASSIUM 400; 57 MG/5ML; MG/5ML
875 POWDER, FOR SUSPENSION ORAL 2 TIMES DAILY
Qty: 218 ML | Refills: 0 | Status: SHIPPED | OUTPATIENT
Start: 2025-05-16 | End: 2025-05-19 | Stop reason: ALTCHOICE

## 2025-05-16 RX ADMIN — PIPERACILLIN AND TAZOBACTAM 4500 MG: 4; .5 INJECTION, POWDER, LYOPHILIZED, FOR SOLUTION INTRAVENOUS at 13:48

## 2025-05-16 RX ADMIN — ONDANSETRON 4 MG: 2 INJECTION, SOLUTION INTRAMUSCULAR; INTRAVENOUS at 10:41

## 2025-05-16 RX ADMIN — IOPAMIDOL 80 ML: 755 INJECTION, SOLUTION INTRAVENOUS at 12:04

## 2025-05-16 ASSESSMENT — PAIN SCALES - GENERAL
PAINLEVEL_OUTOF10: 4
PAINLEVEL_OUTOF10: 8
PAINLEVEL_OUTOF10: 4
PAINLEVEL_OUTOF10: 4
PAINLEVEL_OUTOF10: 8
PAINLEVEL_OUTOF10: 8

## 2025-05-16 ASSESSMENT — PAIN - FUNCTIONAL ASSESSMENT
PAIN_FUNCTIONAL_ASSESSMENT: 0-10

## 2025-05-16 NOTE — ED NOTES
Pt resting on cot. Vitals remain stable. Rates pain 8/10. Refusing pain meds at this time. Call light in reach

## 2025-05-16 NOTE — ED TRIAGE NOTES
Pt presents to the ED through triage with c/c abdominal pain and left flank pain since 5/12. Pt reports blood in urine as well. Pt reports hx gallstones. Rates pain 8/10 at this time. Vitals stable. Afebrile.

## 2025-05-16 NOTE — DISCHARGE INSTRUCTIONS
Our workup identified a urinary tract infection that extends to your kidneys, diverticulitis, and pneumonia of your lungs.  We would like for you to stay for IV antibiotics, but understand that you do not want to be hospitalized.  Oral antibiotics have been prescribed, if you cannot tolerate oral antibiotics, you should please reach out to your primary doctor soon as possible.  If your symptoms worsen, return to the ER.

## 2025-05-16 NOTE — ED PROVIDER NOTES
Pt Name: Baylee Irizarry  MRN: [id number]  Birthdate 1955  Date of evaluation: 5/16/2025  ED Resident: Kenia Henriquez MD  ED Attending: Dr. Shannon    CHIEF COMPLAINT       Chief Complaint   Patient presents with    Abdominal Pain    Flank Pain     LEFT     History obtained from the patient.    HISTORY OF PRESENT ILLNESS    HPI  Baylee Irizarry is a 70 y.o. female who presents to the emergency department for evaluation of abdominal pain    The patient states that she has been experiencing mid left back/CVA pain for about three weeks that has progressively worsened. Nothing makes it better or worse. She has not tried OTC pain medications and only eats some chocolate when it bothers her too much.     She has had hematuria but no dysuria. She had an episode of dry heaving that prompted her to come in today but otherwise no NVD. No dark tarry or bloody stools. Denies shortness of breath but appears conversationally dyspneic during HPI. Denies chest pain but does have a cough  nonproductive.     Hx of pelvic abscess 10/2024 drained by Dr. Mejia. Otherwise remote cholecystectomy, appendectomy, hysterectomy.     Hx of CHF. Not on any blood thinners. No Hx of blood clots    The patient has no other acute complaints at this time.    ALLERGIES     Allergies   Allergen Reactions    Latex     Symbicort [Budesonide-Formoterol Fumarate] Shortness Of Breath and Swelling    Cephalexin     Keflex [Cephalexin]     Levofloxacin     Percocet [Oxycodone-Acetaminophen] Other (See Comments)     Blood in urine          PHYSICAL EXAM     Additional Vital Signs:  Vitals:    05/16/25 1424   BP: (!) 183/69   Pulse: 80   Resp: 18   Temp:    SpO2: 98%     Physical Exam  Constitutional:       Appearance: Normal appearance. She is obese.   HENT:      Head: Normocephalic and atraumatic.      Nose: Nose normal.      Mouth/Throat:      Mouth: Mucous membranes are moist.      Pharynx: Oropharynx is clear.   Eyes:

## 2025-05-18 LAB
BACTERIA BLD AEROBE CULT: NORMAL
BACTERIA BLD AEROBE CULT: NORMAL
BACTERIA UR CULT: ABNORMAL
ORGANISM: ABNORMAL

## 2025-05-19 ENCOUNTER — TELEPHONE (OUTPATIENT)
Dept: PHARMACY | Age: 70
End: 2025-05-19

## 2025-05-19 LAB
GAMMA INTERFERON BACKGROUND BLD IA-ACNC: 0.04 IU/ML
M TB IFN-G BLD-IMP: NEGATIVE
M TB IFN-G CD4+ BCKGRND COR BLD-ACNC: 0 IU/ML
M TB IFN-G CD4+CD8+ BCKGRND COR BLD-ACNC: 0 IU/ML
MITOGEN IGNF BCKGRD COR BLD-ACNC: 9.67 IU/ML

## 2025-05-19 RX ORDER — METRONIDAZOLE 500 MG/1
500 TABLET ORAL 3 TIMES DAILY
Qty: 21 TABLET | Refills: 0 | Status: SHIPPED | OUTPATIENT
Start: 2025-05-19 | End: 2025-05-26

## 2025-05-19 RX ORDER — SULFAMETHOXAZOLE AND TRIMETHOPRIM 200; 40 MG/5ML; MG/5ML
160 SUSPENSION ORAL 2 TIMES DAILY
Qty: 280 ML | Refills: 0 | Status: SHIPPED | OUTPATIENT
Start: 2025-05-19 | End: 2025-05-26

## 2025-05-19 NOTE — TELEPHONE ENCOUNTER
Pharmacy Note  ED Culture Follow-up    Baylee Irizarry is a 70 y.o. female.     Allergies: Latex, Symbicort [budesonide-formoterol fumarate], Cephalexin, Keflex [cephalexin], Levofloxacin, and Percocet [oxycodone-acetaminophen]     Labs:  Lab Results   Component Value Date    BUN 10 05/16/2025    CREATININE 0.6 05/16/2025    WBC 11.9 (H) 05/16/2025     Estimated Creatinine Clearance: 116 mL/min (based on SCr of 0.6 mg/dL).    Current antimicrobials:   Augmentin and doxycyline for pyelonephritis, diverticulitis of colon, and pneumonia     ASSESSMENT:  Micro results:   Urine culture: positive for E. Coli     PLAN:  Need for intervention: Yes  Discussed with: DO El Araujo treatment:    Start Bactrim 200-40 mg/5 mL suspension - take 20 mL by mouth twice daily for 7 days  Start metronidazole 500 mg by mouth three times daily for 7 days  Continue doxycycline  Stop Augmentin     Patient response:   Called and spoke with patient. Reviewed urine culture result. Patient instructed to start Bactrim and metronidazole, continue doxycycline, and stop Augmentin. Prescriptions for Bactrim and Flagyl sent to below pharmacy as requested by the patient. Patient verbalized understanding of the above plan and all questions were answered.     Called/sent in prescription to:  [facility] on [address]     Please call with any questions. Ext. [phone]    Karina Amaya RPH, PharmD 4:50 PM 5/19/2025

## 2025-05-21 LAB
BACTERIA BLD AEROBE CULT: NORMAL
BACTERIA BLD AEROBE CULT: NORMAL

## 2025-07-11 ENCOUNTER — LAB (OUTPATIENT)
Dept: LAB | Age: 70
End: 2025-07-11

## 2025-07-11 ENCOUNTER — OFFICE VISIT (OUTPATIENT)
Age: 70
End: 2025-07-11
Payer: MEDICARE

## 2025-07-11 VITALS
WEIGHT: 285.38 LBS | BODY MASS INDEX: 50.57 KG/M2 | DIASTOLIC BLOOD PRESSURE: 80 MMHG | HEIGHT: 63 IN | HEART RATE: 84 BPM | SYSTOLIC BLOOD PRESSURE: 150 MMHG

## 2025-07-11 DIAGNOSIS — E04.2 MULTINODULAR GOITER: Primary | ICD-10-CM

## 2025-07-11 DIAGNOSIS — E05.00 GRAVES DISEASE: ICD-10-CM

## 2025-07-11 DIAGNOSIS — E05.90 HYPERTHYROIDISM: ICD-10-CM

## 2025-07-11 LAB
ALBUMIN SERPL BCG-MCNC: 3.7 G/DL (ref 3.4–4.9)
ALP SERPL-CCNC: 122 U/L (ref 38–126)
ALT SERPL W/O P-5'-P-CCNC: 15 U/L (ref 10–35)
ANION GAP SERPL CALC-SCNC: 12 MEQ/L (ref 8–16)
AST SERPL-CCNC: 23 U/L (ref 10–35)
BASOPHILS ABSOLUTE: 0 THOU/MM3 (ref 0–0.1)
BASOPHILS NFR BLD AUTO: 0.6 %
BILIRUB SERPL-MCNC: 0.5 MG/DL (ref 0.3–1.2)
BUN SERPL-MCNC: 14 MG/DL (ref 8–23)
CALCIUM SERPL-MCNC: 9.5 MG/DL (ref 8.8–10.2)
CHLORIDE SERPL-SCNC: 100 MEQ/L (ref 98–111)
CO2 SERPL-SCNC: 27 MEQ/L (ref 22–29)
CREAT SERPL-MCNC: 0.5 MG/DL (ref 0.5–0.9)
DEPRECATED RDW RBC AUTO: 43.2 FL (ref 35–45)
EOSINOPHIL NFR BLD AUTO: 2.2 %
EOSINOPHILS ABSOLUTE: 0.1 THOU/MM3 (ref 0–0.4)
ERYTHROCYTE [DISTWIDTH] IN BLOOD BY AUTOMATED COUNT: 15.1 % (ref 11.5–14.5)
GFR SERPL CREATININE-BSD FRML MDRD: > 90 ML/MIN/1.73M2
GLUCOSE SERPL-MCNC: 123 MG/DL (ref 74–109)
HCT VFR BLD AUTO: 43.8 % (ref 37–47)
HGB BLD-MCNC: 13.5 GM/DL (ref 12–16)
IMM GRANULOCYTES # BLD AUTO: 0.02 THOU/MM3 (ref 0–0.07)
IMM GRANULOCYTES NFR BLD AUTO: 0.3 %
LYMPHOCYTES ABSOLUTE: 1.9 THOU/MM3 (ref 1–4.8)
LYMPHOCYTES NFR BLD AUTO: 30.4 %
MCH RBC QN AUTO: 24.3 PG (ref 26–33)
MCHC RBC AUTO-ENTMCNC: 30.8 GM/DL (ref 32.2–35.5)
MCV RBC AUTO: 78.9 FL (ref 81–99)
MONOCYTES ABSOLUTE: 0.5 THOU/MM3 (ref 0.4–1.3)
MONOCYTES NFR BLD AUTO: 7.3 %
NEUTROPHILS ABSOLUTE: 3.8 THOU/MM3 (ref 1.8–7.7)
NEUTROPHILS NFR BLD AUTO: 59.2 %
NRBC BLD AUTO-RTO: 0 /100 WBC
PLATELET # BLD AUTO: 250 THOU/MM3 (ref 130–400)
PMV BLD AUTO: 11.1 FL (ref 9.4–12.4)
POTASSIUM SERPL-SCNC: 4.1 MEQ/L (ref 3.5–5.2)
PROT SERPL-MCNC: 7 G/DL (ref 6.4–8.3)
RBC # BLD AUTO: 5.55 MILL/MM3 (ref 4.2–5.4)
SODIUM SERPL-SCNC: 139 MEQ/L (ref 135–145)
T3FREE SERPL-MCNC: 3.5 PG/ML (ref 2–4.4)
T4 FREE SERPL-MCNC: 1 NG/DL (ref 0.92–1.68)
TSH SERPL DL<=0.05 MIU/L-ACNC: 0.5 UIU/ML (ref 0.27–4.2)
WBC # BLD AUTO: 6.4 THOU/MM3 (ref 4.8–10.8)

## 2025-07-11 PROCEDURE — 1160F RVW MEDS BY RX/DR IN RCRD: CPT | Performed by: INTERNAL MEDICINE

## 2025-07-11 PROCEDURE — 99214 OFFICE O/P EST MOD 30 MIN: CPT | Performed by: INTERNAL MEDICINE

## 2025-07-11 PROCEDURE — 1124F ACP DISCUSS-NO DSCNMKR DOCD: CPT | Performed by: INTERNAL MEDICINE

## 2025-07-11 PROCEDURE — 1159F MED LIST DOCD IN RCRD: CPT | Performed by: INTERNAL MEDICINE

## 2025-07-11 PROCEDURE — 3077F SYST BP >= 140 MM HG: CPT | Performed by: INTERNAL MEDICINE

## 2025-07-11 PROCEDURE — 3079F DIAST BP 80-89 MM HG: CPT | Performed by: INTERNAL MEDICINE

## 2025-07-11 RX ORDER — EPINEPHRINE 0.3 MG/.3ML
INJECTION SUBCUTANEOUS
COMMUNITY
Start: 2025-06-04

## 2025-07-11 RX ORDER — LOSARTAN POTASSIUM 100 MG/1
100 TABLET ORAL DAILY
COMMUNITY
Start: 2025-05-13

## 2025-07-11 RX ORDER — ALBUTEROL SULFATE 90 UG/1
2 INHALANT RESPIRATORY (INHALATION) EVERY 4 HOURS PRN
COMMUNITY
Start: 2025-06-01

## 2025-07-11 NOTE — PROGRESS NOTES
Hyperthyroidism Checklist   Name: Baylee Batesmariama    YOB: 1955    Hyperthyroidism Yes No   Weight Loss  []  [x]    Anxiety  []  [x]    Tremors  []  [x]    Palpitation/Rapid Heart Beat  []  [x]    Loose/Frequent Bowel Movements  [x]  []    Fatigue/Low Energy  []  [x]    Insomnia  []  [x]    Skin Rash  []  [x]    Eye Pain/Dryness  [x]  []    Heat Intolerance  []  [x]

## 2025-07-11 NOTE — PROGRESS NOTES
[facility] [place] [facility]  [facility] ENDOCRINOLOGY  [address]  [address]  [facility] [address]  Dept: [phone]  Loc: [phone]     Visit Date: 2025    Baylee Irizarry is a 70 y.o. female who presents today for:  Chief Complaint   Patient presents with    Follow-up     Hyperthyroidism     The patient (or guardian, if applicable) and other individuals in attendance with the patient were advised that Artificial Intelligence will be utilized during this visit to record, process the conversation to generate a clinical note, and support improvement of the AI technology. The patient (or guardian, if applicable) and other individuals in attendance at the appointment consented to the use of AI, including the recording.       Subjective:      HPI   History of Present Illness  70-year-old female being followed for hypothyroidism, Graves' disease and multinodular goiter.  The patient is currently taking Tapazole 5 mg daily.  Labs from today showed normal free T4, free T3 and TSH.  The patient reports no signs or symptoms of thyrotoxicosis.  This patient also has a multinodular goiter status post previous biopsy.  She had a repeat ultrasound last month which showed 2 nodules on the right lobe for which biopsy was recommended.  She has not yet completed this procedure but she denies pain, choking and hoarseness of the voice.           Past Medical History:   Diagnosis Date    Anemia     Anxiety     Asthma     CHF (congestive heart failure) ([facility])     COPD (chronic obstructive pulmonary disease) ([facility])     CRP elevated     Depression     DM type 2 (diabetes mellitus, type 2) ([facility])     Headache(784.0)     HTN (hypertension)     Lupus     Obesity     RIRI (obstructive sleep apnea)     Osteoarthritis     Positive KODAK (antinuclear antibody)       Past Surgical History:   Procedure Laterality Date     SECTION      FOOT SURGERY      HYSTERECTOMY (CERVIX STATUS UNKNOWN)      LAPAROSCOPY

## 2025-07-14 LAB — TSI SER-ACNC: 7.15 IU/L

## 2025-07-29 ENCOUNTER — RESULTS FOLLOW-UP (OUTPATIENT)
Age: 70
End: 2025-07-29

## 2025-07-29 DIAGNOSIS — E05.90 HYPERTHYROIDISM: Primary | ICD-10-CM

## 2025-07-29 DIAGNOSIS — E05.00 GRAVES DISEASE: ICD-10-CM

## 2025-07-29 NOTE — RESULT ENCOUNTER NOTE
I have reviewed Baylee Irizarry labs and they are normal.  Therefore they do not need to make any changes in their care plan. Follow up labs ordered for prior to follow up appointment  Please notify patient and document response.

## 2025-07-30 NOTE — TELEPHONE ENCOUNTER
----- Message from KALIN Downing CNP sent at 7/29/2025  2:14 PM EDT -----  I have reviewed [facility] Irizarry labs and they are normal.  Therefore they do not need to make any changes in their care plan. Follow up labs ordered for prior to follow up appointment  Please notify patient and document response.

## (undated) DEVICE — GLOVE ORANGE PI 7 1/2   MSG9075

## (undated) DEVICE — TROCAR: Brand: KII FIOS FIRST ENTRY

## (undated) DEVICE — GOWN,SIRUS,NON REINFRCD,LARGE,SET IN SL: Brand: MEDLINE

## (undated) DEVICE — COVER,MAYO STAND,STERILE: Brand: MEDLINE

## (undated) DEVICE — TIP COVER ACCESSORY

## (undated) DEVICE — TUBING INSUFFLATOR HEAT HUMIDIFIED SMK EVAC SET PNEUMOCLEAR

## (undated) DEVICE — DRAIN SURG 19FR 0.25IN SIL RND W/ TRCR INDIC DOT RADPQ FULL

## (undated) DEVICE — SOLUTION ANTIFOG VIS SYS CLEARIFY LAPSCP

## (undated) DEVICE — LIQUIBAND RAPID ADHESIVE 36/CS 0.8ML: Brand: MEDLINE

## (undated) DEVICE — BLADE,CARBON-STEEL,11,STRL,DISPOSABLE,TB: Brand: MEDLINE

## (undated) DEVICE — SEAL

## (undated) DEVICE — SHEET, T, LAPAROTOMY, STERILE: Brand: MEDLINE

## (undated) DEVICE — ELECTRO LUBE IS A SINGLE PATIENT USE DEVICE THAT IS INTENDED TO BE USED ON ELECTROSURGICAL ELECTRODES TO REDUCE STICKING.: Brand: KEY SURGICAL ELECTRO LUBE

## (undated) DEVICE — SUTURE VICRYL + SZ 0 L18IN ABSRB TIE VCP106G

## (undated) DEVICE — BASIC SINGLE BASIN BTC-LF: Brand: MEDLINE INDUSTRIES, INC.

## (undated) DEVICE — BANDAGE ADH W1XL3IN NAT FAB WVN FLX DURABLE N ADH PD SEAL

## (undated) DEVICE — SUTURE VICRYL + SZ 4-0 L27IN ABSRB WHT FS-2 3/8 CIR REV CUT VCP422H

## (undated) DEVICE — GAUZE,SPONGE,8"X4",12PLY,XRAY,STRL,LF: Brand: MEDLINE

## (undated) DEVICE — APPLICATOR MEDICATED 26 CC SOLUTION HI LT ORNG CHLORAPREP

## (undated) DEVICE — EVACUATOR SURG 100CC SIL BLB SUCT RESVR FOR CLS WND DRNGE

## (undated) DEVICE — SUCTION IRRIGATOR: Brand: ENDOWRIST

## (undated) DEVICE — SUTURE VICRYL + SZ 0 L27IN ABSRB VLT L26MM UR-6 5/8 CIR VCP603H